# Patient Record
Sex: FEMALE | Race: BLACK OR AFRICAN AMERICAN | NOT HISPANIC OR LATINO | Employment: OTHER | URBAN - METROPOLITAN AREA
[De-identification: names, ages, dates, MRNs, and addresses within clinical notes are randomized per-mention and may not be internally consistent; named-entity substitution may affect disease eponyms.]

---

## 2017-03-08 ENCOUNTER — OFFICE VISIT (OUTPATIENT)
Dept: OPTOMETRY | Facility: CLINIC | Age: 57
End: 2017-03-08
Payer: COMMERCIAL

## 2017-03-08 DIAGNOSIS — I10 ESSENTIAL HYPERTENSION: ICD-10-CM

## 2017-03-08 DIAGNOSIS — H35.89 WHITE WITHOUT PRESSURE OF PERIPHERAL RETINA OF RIGHT EYE: ICD-10-CM

## 2017-03-08 DIAGNOSIS — Z13.5 SCREENING FOR EYE CONDITION: ICD-10-CM

## 2017-03-08 DIAGNOSIS — H52.203 ASTIGMATISM, BILATERAL: ICD-10-CM

## 2017-03-08 DIAGNOSIS — H52.4 PRESBYOPIA OU: ICD-10-CM

## 2017-03-08 DIAGNOSIS — H25.13 NUCLEAR SCLEROSIS, BILATERAL: Primary | ICD-10-CM

## 2017-03-08 DIAGNOSIS — H52.13 HIGH MYOPIA, BOTH EYES: ICD-10-CM

## 2017-03-08 PROCEDURE — 92004 COMPRE OPH EXAM NEW PT 1/>: CPT | Mod: S$GLB,,, | Performed by: OPTOMETRIST

## 2017-03-08 PROCEDURE — 99999 PR PBB SHADOW E&M-EST. PATIENT-LVL III: CPT | Mod: PBBFAC,,, | Performed by: OPTOMETRIST

## 2017-03-08 PROCEDURE — 92015 DETERMINE REFRACTIVE STATE: CPT | Mod: S$GLB,,, | Performed by: OPTOMETRIST

## 2017-03-08 NOTE — MR AVS SNAPSHOT
[facility]  [address]  [facility] [address]  Phone: [phone]  Fax: [phone]                  Latia Posada   3/8/2017 10:15 AM   Office Visit    Description:  Female : 1960   Provider:  Branden Esqueda OD   Department:  [facility]           Reason for Visit     Concerns About Ocular Health                To Do List           Goals (5 Years of Data)     None      [facility]     [facility] On Call Nurse Care Line -  Assistance  Registered nurses in the [facility] On Call Center provide clinical advisement, health education, appointment booking, and other advisory services.  Call for this free service at [phone].             Medications           Message regarding Medications     Verify the changes and/or additions to your medication regime listed below are the same as discussed with your clinician today.  If any of these changes or additions are incorrect, please notify your healthcare provider.             Verify that the below list of medications is an accurate representation of the medications you are currently taking.  If none reported, the list may be blank. If incorrect, please contact your healthcare provider. Carry this list with you in case of emergency.           Current Medications     amlodipine (NORVASC) 10 MG tablet TAKE 1 TABLET BY MOUTH EVERY DAY    amlodipine (NORVASC) 10 MG tablet TAKE 1 TABLET BY MOUTH EVERY DAY    amlodipine (NORVASC) 10 MG tablet TAKE 1 TABLET BY MOUTH EVERY DAY    benazepril (LOTENSIN) 40 MG tablet TAKE 1 TABLET BY MOUTH EVERY DAY    benazepril (LOTENSIN) 40 MG tablet TAKE 1 TABLET BY MOUTH EVERY DAY    benazepril (LOTENSIN) 40 MG tablet TAKE 1 TABLET BY MOUTH EVERY DAY           Clinical Reference Information           Allergies as of 3/8/2017     Lactose      Immunizations Administered on Date of Encounter - 3/8/2017     None      Instructions    New spectacle lens Rx issued.  Recheck in 12 months.         Language Assistance Services     ATTENTION: Language assistance services are available, free of charge. Please call [phone].      ATENCIÓN: Si habla efren, tiene a solano disposición servicios gratuitos de asistencia lingüística. Llame al [phone].     CHÚ Ý: N?u b?n nói Ti?ng Vi?t, có các d?ch v? h? tr? ngôn ng? mi?n phí dành cho b?n. G?i s? [phone].         [facility] complies with applicable Federal civil rights laws and does not discriminate on the basis of race, color, national origin, age, disability, or sex.

## 2017-03-08 NOTE — PATIENT INSTRUCTIONS
Early/trace nuclear sclerosis of lens of both eyes.  White without pressure of peripheral retina of the right eye.  Essential hypertension, without evidence of hypertensive retinopathy.  High myopia with astigmatism in each eye, with very satisfactory correctable VA in each eye.  Presbyopia consistent with age.    New spectacle lens Rx issued.  Recheck in 12 months.

## 2017-03-09 NOTE — PROGRESS NOTES
HPI     Concerns About Ocular Health    Additional comments: Eye exam and refraction.  notes some slight decrease   in VA with glasses, but overall doing satisfactorily.   Some (sinus?)   headaches.            Comments   Patient's age: 56 y.o. AAF   Occupation: Writer  Approximate date of last eye examination: 08/11/2011  Name of last eye doctor seen: Dr. Esqueda   City/State: [facility]   Wears glasses? Yes      If yes, wears  Full-time or part-time?  Full time   Present glasses are: Bifocal, SV Distance, SV Reading?  Progressive   Approximate age of present glasses:  7 yrs old    Got new glasses following last exam, or subsequently?:  Yes    Any problem with VA with glasses?  Pt feels her vision has changed   overall   Wears CLs?:  No  Headaches?  No  Eye pain/discomfort?  No                                                                                     Flashes?  No  Floaters?  No  Diplopia/Double vision?  No  Patient's Ocular History:         Any eye surgeries? No         Any eye injury?  No         Any treatment for eye disease? Giant papillar conjunctivitis  Family history of eye disease?  Glaucoma- father   Significant patient medical history:         1. Diabetes?  No       If yes, IDDM or NIDDM?  n/a   2. HBP?  Yes, managed with medication                  ! OTC eyedrops currently using:  Redness relief    ! Prescription eye meds currently using:  No   ! Any history of allergy/adverse reaction to any eye meds used   previously?  No    ! Any history of allergy/adverse reaction to eyedrops used during prior   eye exam(s)? No    ! Any history of allergy/adverse reaction to Novacaine or similar meds?   No   ! Any history of allergy/adverse reaction to Epinephrine or similar meds?   No    ! Patient okay with use of anesthetic eyedrops to check eye pressure?    yes        ! Patient okay with use of eyedrops to dilate pupils today?  yes   !  Allergies/Medications/Medical History/Family History reviewed today?   "  Yes       PD = 67/63   Desired reading distance =  15.75"  Approx computer distance =                                                                  Last edited by Branden Esqueda, OD on 3/8/2017 11:21 AM. (History)            Assessment /Plan     For exam results, see Encounter Report.    1. Nuclear sclerosis, bilateral     2. White without pressure of peripheral retina of right eye     3. High myopia, both eyes     4. Astigmatism, bilateral     5. Presbyopia OU     6. Essential hypertension     7. Screening for eye condition                    Early/trace nuclear sclerosis of lens of both eyes.  White without pressure of peripheral retina of the right eye.  Essential hypertension, without evidence of hypertensive retinopathy.  High myopia with astigmatism in each eye, with very satisfactory correctable VA in each eye.  Presbyopia consistent with age.    New spectacle lens Rx issued.  Recheck in 12 months.         "

## 2017-03-31 RX ORDER — BENAZEPRIL HYDROCHLORIDE 40 MG/1
TABLET ORAL
Qty: 90 TABLET | Refills: 0 | Status: SHIPPED | OUTPATIENT
Start: 2017-03-31 | End: 2017-06-14 | Stop reason: SDUPTHER

## 2017-05-22 ENCOUNTER — HOSPITAL ENCOUNTER (OUTPATIENT)
Dept: RADIOLOGY | Facility: HOSPITAL | Age: 57
Discharge: HOME | End: 2017-05-22
Attending: INTERNAL MEDICINE
Payer: COMMERCIAL

## 2017-05-22 DIAGNOSIS — N20.0 CALCULUS OF KIDNEY: ICD-10-CM

## 2017-05-22 DIAGNOSIS — N28.1 ACQUIRED CYST OF KIDNEY: ICD-10-CM

## 2017-05-22 DIAGNOSIS — I10 ESSENTIAL HYPERTENSION: ICD-10-CM

## 2017-05-22 PROCEDURE — 76770 US EXAM ABDO BACK WALL COMP: CPT | Mod: TC

## 2017-05-22 PROCEDURE — 76770 US EXAM ABDO BACK WALL COMP: CPT | Mod: 26,,, | Performed by: RADIOLOGY

## 2017-06-14 ENCOUNTER — OFFICE VISIT (OUTPATIENT)
Dept: NEPHROLOGY | Facility: CLINIC | Age: 57
End: 2017-06-14
Payer: COMMERCIAL

## 2017-06-14 VITALS
DIASTOLIC BLOOD PRESSURE: 90 MMHG | HEART RATE: 96 BPM | OXYGEN SATURATION: 93 % | BODY MASS INDEX: 22.85 KG/M2 | SYSTOLIC BLOOD PRESSURE: 124 MMHG | WEIGHT: 142.19 LBS | HEIGHT: 66 IN

## 2017-06-14 DIAGNOSIS — N20.0 CALCULUS OF KIDNEY: ICD-10-CM

## 2017-06-14 DIAGNOSIS — I10 ESSENTIAL HYPERTENSION: Primary | ICD-10-CM

## 2017-06-14 DIAGNOSIS — N28.1 ACQUIRED CYST OF KIDNEY: ICD-10-CM

## 2017-06-14 PROCEDURE — 99999 PR PBB SHADOW E&M-EST. PATIENT-LVL II: CPT | Mod: PBBFAC,,, | Performed by: INTERNAL MEDICINE

## 2017-06-14 PROCEDURE — 99213 OFFICE O/P EST LOW 20 MIN: CPT | Mod: S$GLB,,, | Performed by: INTERNAL MEDICINE

## 2017-06-14 RX ORDER — BENAZEPRIL HYDROCHLORIDE 40 MG/1
40 TABLET ORAL DAILY
Qty: 90 TABLET | Refills: 3 | Status: SHIPPED | OUTPATIENT
Start: 2017-06-14 | End: 2018-06-09 | Stop reason: SDUPTHER

## 2017-06-14 RX ORDER — AMLODIPINE BESYLATE 10 MG/1
10 TABLET ORAL DAILY
Qty: 90 TABLET | Refills: 3 | Status: SHIPPED | OUTPATIENT
Start: 2017-06-14 | End: 2018-06-09 | Stop reason: SDUPTHER

## 2017-06-14 NOTE — PROGRESS NOTES
Subjective:       Patient ID: Latia Posada is a 57 y.o. Black or  female who presents for follow-up evaluation of hypertension and nephrolithiasis.    HPI  She has no urinary symptoms and is in her usual state of health.  There have been no recent illnesses, hospitalizations or procedures. Last renal colic documented several years ago.  She does not take NSAIDs.       Review of Systems   Constitutional: Negative for appetite change, chills and fever.   Eyes: Negative for visual disturbance.   Respiratory: Negative for cough and shortness of breath.    Cardiovascular: Negative for chest pain and leg swelling.   Gastrointestinal: Negative for diarrhea, nausea and vomiting.   Genitourinary: Negative for difficulty urinating, dysuria and hematuria.   Skin: Negative for rash.       Objective:    /89 mmHg  Physical Exam   Constitutional: She appears well-developed and well-nourished. No distress.   HENT:   Head: Normocephalic and atraumatic.   Eyes: Conjunctivae are normal. No scleral icterus.   Neck: Normal range of motion. No JVD present.   Cardiovascular: Normal rate, regular rhythm and normal heart sounds.  Exam reveals no gallop and no friction rub.    No murmur heard.  Pulmonary/Chest: Effort normal and breath sounds normal. No respiratory distress. She has no wheezes.   Abdominal: Soft. Bowel sounds are normal. She exhibits no distension. There is no tenderness.   Musculoskeletal: She exhibits no edema.   Skin: Skin is warm and dry. No rash noted.   Psychiatric: She has a normal mood and affect.   Vitals reviewed.      Serum Cr 0.7 mg/dL    Assessment:       1. Essential hypertension. Relatively well controlled. Will continue benazepril and amlodiopine   2. Acquired cyst of kidney. Unchanged   3. Calculus of kidney. Last renal US shows less stones. Reminded to maintain high water intake and low salt intake.        Plan:   Return to clinic in 1 year

## 2017-06-22 ENCOUNTER — OFFICE VISIT (OUTPATIENT)
Dept: INTERNAL MEDICINE | Facility: CLINIC | Age: 57
End: 2017-06-22
Payer: COMMERCIAL

## 2017-06-22 DIAGNOSIS — I10 ESSENTIAL HYPERTENSION: ICD-10-CM

## 2017-06-22 DIAGNOSIS — Z12.31 OTHER SCREENING MAMMOGRAM: ICD-10-CM

## 2017-06-22 DIAGNOSIS — E04.1 THYROID NODULE: ICD-10-CM

## 2017-06-22 DIAGNOSIS — Z00.00 ANNUAL PHYSICAL EXAM: Primary | ICD-10-CM

## 2017-06-22 PROCEDURE — 99999 PR PBB SHADOW E&M-EST. PATIENT-LVL III: CPT | Mod: PBBFAC,,, | Performed by: INTERNAL MEDICINE

## 2017-06-22 PROCEDURE — 99396 PREV VISIT EST AGE 40-64: CPT | Mod: S$GLB,,, | Performed by: INTERNAL MEDICINE

## 2017-06-22 NOTE — PATIENT INSTRUCTIONS
Back Care Tips    Caring for your back  These are things you can do to prevent a recurrence of acute back pain and to reduce symptoms from chronic back pain:  · Maintain a healthy weight. If you are overweight, losing weight will help most types of back pain.  · Exercise is an important part of recovery from most types of back pain. The muscles behind and in front of the spine support the back. This means strengthening both the back muscles and the abdominal muscles will provide better support for your spine.   · Swimming and brisk walking are good overall exercises to improve your fitness level.  · Practice safe lifting methods (below).  · Practice good posture when sitting, standing and walking. Avoid prolonged sitting. This puts more stress on the lower back than standing or walking.  · Wear quality shoes with sufficient arch support. Foot and ankle alignment can affect back symptoms. Women should avoid wearing high heels.  · Therapeutic massage can help relax the back muscles without stretching them.  · During the first 24 to 72 hours after an acute injury or flare-up of chronic back pain, apply an ice pack to the painful area for 20 minutes and then remove it for 20 minutes, over a period of 60 to 90 minutes, or several times a day. As a safety precaution, do not use a heating pad at bedtime. Sleeping on a heating pad can lead to skin burns or tissue damage.  · You can alternate ice and heat therapies.  Medications  Talk to your healthcare provider before using medicines, especially if you have other medical problems or are taking other medicines.  · You may use acetaminophen or ibuprofen to control pain, unless your healthcare provider prescribed other pain medicine. If you have chronic conditions like diabetes, liver or kidney disease, stomach ulcers, or gastrointestinal bleeding, or are taking blood thinners, talk with your healthcare provider before taking any medicines.  · Be careful if you are given  prescription pain medicines, narcotics, or medicine for muscle spasm. They can cause drowsiness, affect your coordination, reflexes, and judgment. Do not drive or operate heavy machinery while taking these types of medicines. Take prescription pain medicine only as prescribed by your healthcare provider.  Lumbar stretch  Here is a simple stretching exercise that will help relax muscle spasm and keep your back more limber. If exercise makes your back pain worse, dont do it.  · Lie on your back with your knees bent and both feet on the ground.  · Slowly raise your left knee to your chest as you flatten your lower back against the floor. Hold for 5 seconds.  · Relax and repeat the exercise with your right knee.  · Do 10 of these exercises for each leg.  Safe lifting method  · Dont bend over at the waist to lift an object off the floor.  Instead, bend your knees and hips in a squat.   · Keep your back and head upright  · Hold the object close to your body, directly in front of you.  · Straighten your legs to lift the object.   · Lower the object to the floor in the reverse fashion.  · If you must slide something across the floor, push it.  Posture tips  Sitting  Sit in chairs with straight backs or low-back support. Keep your knees lower than your hips, with your feet flat on the floor.  When driving, sit up straight. Adjust the seat forward so you are not leaning toward the steering wheel.  A small pillow or rolled towel behind your lower back may help if you are driving long distances.   Standing  When standing for long periods, shift most of your weight to one leg at a time. Alternate legs every few minutes.   Sleeping  The best way to sleep is on your side with your knees bent. Put a low pillow under your head to support your neck in a neutral spine position. Avoid thick pillows that bend your neck to one side. Put a pillow between your legs to further relax your lower back. If you sleep on your back, put pillows  under your knees to support your legs in a slightly flexed position. Use a firm mattress. If your mattress sags, replace it, or use a 1/2-inch plywood board under the mattress to add support.  Follow-up care  Follow up with your healthcare provider, or as advised.  If X-rays, a CT scan or an MRI scan were taken, they will be reviewed by a radiologist. You will be notified of any new findings that may affect your care.  Call 911  Seek emergency medical care if any of the following occur:  · Trouble breathing  · Confusion  · Very drowsy  · Fainting or loss of consciousness  · Rapid or very slow heart rate  · Loss of  bowel or bladder control  When to seek medical care  Call your healthcare provider if any of the following occur:  · Pain becomes worse or spreads to your arms or legs  · Weakness or numbness in one or both arms or legs  · Numbness in the groin area  Date Last Reviewed: 6/1/2016  © 7768-1316 [employer]. [address] [place] [address]. All rights reserved. This information is not intended as a substitute for professional medical care. Always follow your healthcare professional's instructions.        Back Pain (Acute or Chronic)    Back pain is one of the most common problems. The good news is that most people feel better in 1 to 2 weeks, and most of the rest in 1 to 2 months. Most people can remain active.  People experience and describe pain differently; not everyone is the same.  · The pain can be sharp, stabbing, shooting, aching, cramping or burning.  · Movement, standing, bending, lifting, sitting, or walking may worsen pain.  · It can be localized to one spot or area, or it can be more generalized.  · It can spread or radiate upwards, to the front, or go down your arms or legs (sciatica).  · It can cause muscle spasm.  Most of the time, mechanical problems with the muscles or spine cause the pain. Mechanical problems are usually caused by an injury to the muscles or  ligaments. While illness can cause back pain, it is usually not caused by a serious illness. Mechanical problems include:   · Physical activity such as sports, exercise, work, or normal activity  · Overexertion, lifting, pushing, pulling incorrectly or too aggressively  · Sudden twisting, bending, or stretching from an accident, or accidental movement  · Poor posture  · Stretching or moving wrong, without noticing pain at the time  · Poor coordination, lack of regular exercise (check with your doctor about this)  · Spinal disc disease or arthritis  · Stress  Pain can also be related to pregnancy, or illness like appendicitis, bladder or kidney infections, pelvic infections, and many other things.  Acute back pain usually gets better in 1 to 2 weeks. Back pain related to disk disease, arthritis in the spinal joints or spinal stenosis (narrowing of the spinal canal) can become chronic and last for months or years.  Unless you had a physical injury (for example, a car accident or fall) X-rays are usually not needed for the initial evaluation of back pain. If pain continues and does not respond to medical treatment, X-rays and other tests may be needed.  Home care  Try these home care recommendations:  · When in bed, try to find a position of comfort. A firm mattress is best. Try lying flat on your back with pillows under your knees. You can also try lying on your side with your knees bent up towards your chest and a pillow between your knees.  · At first, do not try to stretch out the sore spots. If there is a strain, it is not like the good soreness you get after exercising without an injury. In this case, stretching may make it worse.  · Avoid prolong sitting, long car rides, or travel. This puts more stress on the lower back than standing or walking.  · During the first 24 to 72 hours after an acute injury or flare up of chronic back pain, apply an ice pack to the painful area for 20 minutes and then remove it for  20 minutes. Do this over a period of 60 to 90 minutes or several times a day. This will reduce swelling and pain. Wrap the ice pack in a thin towel or plastic to protect your skin.  · You can start with ice, then switch to heat. Heat (hot shower, hot bath, or heating pad) reduces pain and works well for muscle spasms. Heat can be applied to the painful area for 20 minutes then remove it for 20 minutes. Do this over a period of 60 to 90 minutes or several times a day. Do not sleep on a heating pad. It can lead to skin burns or tissue damage.  · You can alternate ice and heat therapy. Talk with your doctor about the best treatment for your back pain.  · Therapeutic massage can help relax the back muscles without stretching them.  · Be aware of safe lifting methods and do not lift anything without stretching first.  Medicines  Talk to your doctor before using medicine, especially if you have other medical problems or are taking other medicines.  · You may use over-the-counter medicine as directed on the bottle to control pain, unless another pain medicine was prescribed. If you have chronic conditions like diabetes, liver or kidney disease, stomach ulcers, or gastrointestinal bleeding, or are taking blood thinners, talk to your doctor before taking any medicine.  · Be careful if you are given a prescription medicines, narcotics, or medicine for muscle spasms. They can cause drowsiness, affect your coordination, reflexes, and judgement. Do not drive or operate heavy machinery.  Follow-up care  Follow up with your healthcare provider, or as advised.   A radiologist will review any X-rays that were taken. Your provide will notify you of any new findings that may affect your care.  Call 911  Call emergency services if any of the following occur:  · Trouble breathing  · Confusion  · Very drowsy or trouble awakening  · Fainting or loss of consciousness  · Rapid or very slow heart rate  · Loss of bowel or bladder  control  When to seek medical advice  Call your healthcare provider right away if any of these occur:   · Pain becomes worse or spreads to your legs  · Weakness or numbness in one or both legs  · Numbness in the groin or genital area  Date Last Reviewed: 7/1/2016 © 2000-2016 [employer]. [address] [place] [address], [place] [address]. All rights reserved. This information is not intended as a substitute for professional medical care. Always follow your healthcare professional's instructions.        Self-Care for Low Back Pain    Most people have low back pain now and then. In many cases, it isnt serious and self-care can help. Sometimes low back pain can be a sign of a bigger problem. Call your healthcare provider if your pain returns often or gets worse over time. For the long-term care of your back, get regular exercise, lose any excess weight and learn good posture.  Take a short rest  Lying down during the day may be beneficial for short periods of time if severe pain increases with sitting or standing. Long-term bed rest could be detrimental.  Reduce pain and swelling  Cold reduces swelling. Both cold and heat can reduce pain. Protect your skin by placing a towel between your body and the ice or heat source.  · For the first few days, apply an ice pack for 15 to 20 minutes .  · After the first few days, try heat for 15 minutes at a time to ease pain. Never sleep on a heating pad.  · Over-the-counter medicine can help control pain and swelling. Try aspirin or ibuprofen.  Exercise  Exercise can help your back heal. It also helps your back get stronger and more flexible, preventing any reinjury. Ask your healthcare provider about specific exercises for your back.  Use good posture to avoid reinjury  · When moving, bend at the hips and knees. Dont bend at the waist or twist around.  · When lifting, keep the object close to your body. Dont try to lift more than you can handle.  · When sitting, keep your  lower back supported. Use a rolled-up towel as needed.  Seek immediate medical care if:  · Youre unable to stand or walk.  · You have a temperature over 100.4°F (38.0°C)  · You have frequent, painful, or bloody urination.  · You have severe abdominal pain.  · You have a sharp, stabbing pain.  · Your pain is constant.  · You have pain or numbness in your leg.  · You feel pain in a new area of your back.  · You notice that the pain isnt decreasing after more than a week.   Date Last Reviewed: 9/29/2015 © 2000-2016 [employer]. [address] [place] [address], [place] [address]. All rights reserved. This information is not intended as a substitute for professional medical care. Always follow your healthcare professional's instructions.        Relieving Back Pain  Back pain is a common problem. You can strain back muscles by lifting too much weight or just by moving the wrong way. Back strain can be uncomfortable, even painful. And it can take weeks or months to improve. To help yourself feel better and prevent future back strains, try these tips.  Important Note: Do not give aspirin to children or teens without first discussing it with your healthcare provider.      ? Ice    Ice reduces muscle pain and swelling. It helps most during the first 24 to 48 hours after an injury.  · Wrap an ice pack or a bag of frozen peas in a thin towel. (Never place ice directly on your skin.)  · Place the ice where your back hurts the most.  · Dont ice for more than 20 minutes at a time.  · You can use ice several times a day.  ? Medicines  Over-the-counter pain relievers can include acetaminophen and anti-inflammatory medicines, which includes aspirin or ibuprofen. They can help ease discomfort. Some also reduce swelling.  · Tell your healthcare provider about any medicines you are already taking.  · Take medicines only as directed.  ? Heat  After the first 48 hours, heat can relax sore muscles and improve blood flow.  · Try a  warm bath or shower. Or use a heating pad set on low. To prevent a burn, keep a cloth between you and the heating pad.  · Dont use a heating pad for more than 15 minutes at a time. Never sleep on a heating pad.  Date Last Reviewed: 9/1/2015  © 5638-8367 [employer]. [address] [place] [address], [place] [address]. All rights reserved. This information is not intended as a substitute for professional medical care. Always follow your healthcare professional's instructions.        Caring for Your Back Throughout the Day  Take care of your back throughout the day. You will likely have fewer back problems if you do. Try to warm up before you move. Shift positions often. Also do your best to form healthy habits.    Warm up for the day  Do a few slow, catlike stretches before starting your day. This simple warmup can soften your disks, stretch your back muscles, and help prevent injuries.  Shift positions often  At work and at home, change positions often. This helps keep your body from getting stiff. Stand up or lean back while you sit. If you can, get up and move every 1/2 hour.  Form healthy habits  Here are some suggestions:   · Keep a healthy weight. When you weigh too much, your back is under excess strain. But losing just a few extra pounds can help a lot.  · Try not to overeat. Learn about serving sizes. The size of a serving depends on the food and the food group. Many foods list serving sizes on the labels.  · Handle minor aches with cold and heat. Apply cold the first 24 to 48 hours. Use heat after that. Always place a thin cloth between your skin and the source of cold or heat.  · Take medicines as directed. This helps keep pain under control. Always read labels, and call your healthcare provider or pharmacist if you have any questions.  Walk each day  A daily walk keeps your back and thigh muscles stretched and strong. This gives your back better support. Be sure to walk with your spines three curves  aligned, by keeping your head, hips, and toes connected by a vertical line.   Date Last Reviewed: 10/18/2015  © 4119-1781 [email or web address]. [address] [place] [address]. All rights reserved. This information is not intended as a substitute for professional medical care. Always follow your healthcare professional's instructions.        Back Exercises: Bridge  The bridge exercise strengthens your abdominal, buttock, and hamstring muscles. This helps keep your back stable and aligned when you walk.  · Lie on the floor with your back and palms flat. Bend your knees. Keep your feet flat on the floor.  · Contract your abdominal and buttock muscles. Slowly lift your buttocks off the floor until there is a straight line from your knees to your shoulders.  · Hold for 5 to 15  seconds. Repeat 5 to10 times.    Date Last Reviewed: 8/31/2015  © 7435-8435 [email or web address]. [address] [place] [address]. All rights reserved. This information is not intended as a substitute for professional medical care. Always follow your healthcare professional's instructions.        Back Exercises: Knee Lift        To start, lie on your back with your knees bent and feet flat on the floor. Dont press your neck or lower back to the floor. Breathe deeply. You should feel comfortable and relaxed in this position:  · Start by tightening your abdominal muscles.  · Lift one bent knee off the floor 2 to 4 inches.  · Hold for 10 seconds. Return to start position.  · Repeat 3 times.  · Switch legs.  Date Last Reviewed: 8/16/2015  © 2476-9696 [email or web address]. [address] [place] [address]. All rights reserved. This information is not intended as a substitute for professional medical care. Always follow your healthcare professional's instructions.        Sanjay Decker (Posture and Strength)    1. Sit in a chair with your feet flat on the floor, or stand up. Relax your shoulders.  2. Look  straight ahead. Gently glide your chin straight back. Its a small movement. Dont tilt your head up or down, or bend your neck forward.  3. Hold for 5 seconds. Then relax.  4. Repeat 5 times, or as instructed.     Tip: Dont arch your back or hunch your shoulders.   Date Last Reviewed: 5/1/2016  © 0185-3768 [employer]. [address] [place] [address]. All rights reserved. This information is not intended as a substitute for professional medical care. Always follow your healthcare professional's instructions.        Neck Exercises: Active Neck Rotation    To start, lie on your back, knees bent and feet flat on the floor. Keep your ears, shoulders, and hips aligned, but dont press your lower back to the floor. Rest your hands on your pelvis. Breathe deeply and relax.    · Use your neck muscles to turn your head to one side until you feel a stretch in the muscles.  · Hold for 5 seconds. Then turn to the other side.  · Repeat 5 times on each side.  Note: Keep your shoulders on the floor. Dont lift or tuck your chin as you turn your head.  Date Last Reviewed: 8/16/2015 © 2000-2016 [employer]. [address] [place] [address]. All rights reserved. This information is not intended as a substitute for professional medical care. Always follow your healthcare professional's instructions.        Exercises: Neck Isometrics  To start, sit in a chair with your feet flat on the floor. Your weight should be slightly forward so that youre balanced evenly on your buttocks. Relax your shoulders and keep your head level. Using a chair with arms may help you keep your balance.  5. Press your palm against your forehead. Resist with your neck muscles. Hold for 10 seconds. Relax. Repeat 5 times.  6. Do the exercise again, pressing on the side of your head. Repeat 5 times. Switch sides.  7. Do the exercise again, pressing on the back of your head. Repeat 5 times.       For your safety,  check with your healthcare provider before starting an exercise program.   Date Last Reviewed: 8/16/2015  © 9963-4403 [employer]. [address] [place] [address], [place] [address]. All rights reserved. This information is not intended as a substitute for professional medical care. Always follow your healthcare professional's instructions.        Neck Problems: Relieving Your Symptoms  The first goal of treatment is to relieve your symptoms. Your healthcare provider may recommend self-care treatments. These include resting, applying ice and heat, taking medicine, and doing exercises. Your healthcare provider may also recommend that you see a physical therapist who can teach you ways to care for and strengthen your neck.     Heat relaxes sore muscles and helps relieve spasms.   Self-care treatments  Pain can end quickly or last awhile. Either way, youll want relief as soon as possible. Your healthcare provider can tell you which treatments to do at home to help relieve your pain.  · Lying down for a short time takes pressure from the head off the neck.  · Ice and heat can help reduce pain. To bring down swelling, rest an ice pack wrapped in a thin towel on your neck for 10 to 15 minutes. To relax sore muscles, apply a warm, wet towel to the area. Or you can take a warm bath or shower.  · Over-the-counter medicines, such as ibuprofen, naproxen, and aspirin, can help reduce pain and swelling. Acetaminophen can help relieve pain. Use these only as directed.  · Exercises can relax muscles and ease stiffness. To prepare, drape a warm, wet towel around your neck and shoulders for 5 minutes. Remove the towel. Then do any exercises recommended to you by your healthcare provider.  Physical therapy  If self-care treatments arent helping relieve neck pain, your healthcare provider may suggest physical therapy. Physical therapy is done by a specialist trained to treat injuries. Your physical therapist (PT) will teach you  how to strengthen muscles, improve the spines alignment, and help you move properly. Treatment methods used in physical therapy may include:  · Heat. A special heating pad called a neck pack may be applied to your neck.  · Exercises. Your PT will teach you exercises to help strengthen your neck and improve its range of motion.  · Joint mobilization. The PT gently moves your vertebrae to help restore motion in your neck joints and reduce neck pain.  · Soft tissue mobilization. The PT massages and stretches the muscles in your neck and shoulders.  · Electrical stimulation. Electrical impulses are sent into your neck. This helps reduce soreness and inflammation.  · Education in body mechanics. The PT shows you ways to position and move your body that protect the neck.  Other treatments  If physical therapy doesnt relieve your neck pain, your healthcare provider may suggest other treatments. For example, medicines or injections can help relieve pain and swelling. In some cases, surgery may be needed to treat neck problems.  Date Last Reviewed: 8/23/2015  © 4696-1309 [employer]. [address] [place] [address], [place] [address]. All rights reserved. This information is not intended as a substitute for professional medical care. Always follow your healthcare professional's instructions.        Reducing Knee Pain and Swelling    Many treatments can help reduce pain and swelling in your knee. Your healthcare provider or physical therapist may suggest one or more of the following treatments:  · Icing your knee helps reduce swelling. You may be asked to ice your knee once a day or more. Apply ice for about 15 to 20 minutes at a time, with at least 40 minutes between sessions. Always keep a towel between the ice and your skin.   · Keeping your leg raised above your heart helps excess fluid flow out of your knee joint. This reduces swelling.  · Compression means wrapping an elastic bandage or neoprene sleeve snugly  around your knees. This keeps fluid from collecting in your knee joint.  · Electrical stimulation, done by a physical therapist or , can help reduce excess fluid in your knee joint.  · Anti-inflammatory medicines may be prescribed by your healthcare provider. You may take pills or receive injections in your knee.  · Isometric (carmine) exercises strengthen the muscles that support your knee joint. They also help reduce excess fluid in your knee.  · Massage helps fluid drain away from your knee.  Date Last Reviewed: 10/13/2015  © 3502-8226 [employer]. [address] [place] [address]. All rights reserved. This information is not intended as a substitute for professional medical care. Always follow your healthcare professional's instructions.        Knee Pain  Knee pain is very common. Its especially common in active people who put a lot of pressure on their knees, like runners. It affects women more often than men.  Your kneecap (patella) is a thick, round bone. It covers and protects the front portion of your knee joint. It moves along a groove in your thighbone (femur) as part of the patellofemoral joint. A layer of cartilage surrounds the underside of your kneecap. This layer protects it from grinding against your femur.  When this cartilage softens and breaks down, it can cause knee pain. This is partly because of repetitive stress. The stress irritates the lining of the joint. This causes pain in the underlying bone.  What causes knee pain?  Many things can cause knee pain. You may have more than one cause. Some of these include:  · Overuse of the knee joint  · The kneecap doesnt line up with the tissue around it  · Damage to small nerves in the area  · Damage to the ligament-like structure that holds the kneecap in place (retinaculum)  · Breakdown of the bone under the cartilage  · Swelling in the soft tissues around the kneecap  · Injury  You might be more likely  to have knee pain if you:  · Exercise a lot  · Recently increased the intensity of your workouts  · Have a body mass index (BMI) greater than 25  · Have poor alignment of your kneecap  · Walk with your feet turned overly outward or inward  · Have weakness in surrounding muscle groups (inner quad or hip adductor muscles)  · Have too much tightness in surrounding muscle groups (hamstrings or iliotibial band)  · Have a recent history of injury to the area  · Are female  Symptoms of knee pain  This type of knee pain is a dull, aching pain in the front of the knee in the area under and around the kneecap. This pain may start quickly or slowly. Your pain might be worse when you squat, run, or sit for a long time. You might also sometimes feel like your knee is giving out. You may have symptoms in one or both of your knees.  Diagnosing knee pain  Your health care provider will ask about your medical history and your symptoms. Be sure to describe any activities that make your knee pain worse. He or she will look at your knee. This will include tests of your range of motion, strength, and areas of pain of your knee. Your knee alignment will be checked.  Your health care provider will need to rule out other causes of your knee pain, such as arthritis. You may need an imaging test, such as an X-ray or MRI.  Treatment for knee pain  Treatments that can help ease your symptoms may include:  · Avoiding activities for a while that make your pain worse, returning to activity over time  · Icing the outside of your knee when it causes you pain  · Taking over-the-counter pain medicine  · Wearing a knee brace or taping your knee to support it  · Wearing special shoe inserts to help keep your feet in the proper alignment  · Doing special exercises to stretch and strengthen the muscles around your hip and your knee  These steps help most people manage knee pain. But some cases of knee pain need to be treated with surgery. You may need  surgery right away. Or you may need it later if other treatments dont work. Your health care provider may refer you to an orthopedic surgeon. He or she will talk with you about your choices.  Preventing knee pain  Losing weight and correcting excess muscle tightness or muscle weakness may help lower your risk.  In some cases, you can prevent knee pain. To help prevent a flare-up of knee pain, you do these things:  · Regularly do all the exercises your doctor or physical therapist advises  · Support your knee as advised by your doctor or physical therapist  · Increase training gradually, and ease up on training when needed  · Have an expert check your gait for running or other sporting activities  · Stretch properly before and after exercise  · Replace your running shoes regularly  · Lose excess weight     When to call your health care provider  Call your health care provider right away if:  · Your symptoms dont get better after a few weeks of treatment  · You have any new symptoms   Date Last Reviewed: 3/19/2015  © 7405-3732 [employer]. [address] [place] [address]. All rights reserved. This information is not intended as a substitute for professional medical care. Always follow your healthcare professional's instructions.        Knee Pain of Uncertain Cause    There are several common causes for knee pain. These can include:  · A sprain of the ligaments that support the joint  · An injury to the cartilage lining of the joint  · Arthritis from wear-and-tear or inflammation  There are other causes as well. There may also be swelling, reduced movement of the knee joint, and pain with walking. A definite diagnosis will still need to be made. If your symptoms do not improve, further follow-up and testing may be needed.  Home care  · Stay off the injured leg as much as possible until pain improves.  · Apply an ice pack over the injured area for 15 to 20 minutes every 3 to 6 hours. You should  do this for the first 24 to 48 hours. You can make an ice pack by filling a plastic bag that seals at the top with ice cubes and then wrapping it with a thin towel. Continue to use ice packs for relief of pain and swelling as needed. As the ice melts, be careful to avoid getting your wrap, splint, or cast wet. After 48 hours, apply heat (warm shower or warm bath) for 15 to 20 minutes several times a day, or alternate ice and heat. If you have to wear a hook-and-loop knee brace, you can open it to apply the ice pack, or heat, directly to the knee. Never put ice directly on the skin. Always wrap the ice in a towel or other type of cloth.  · You may use over-the-counter pain medicine to control pain, unless another pain medicine was prescribed. If you have chronic liver or kidney disease or ever had a stomach ulcer or GI bleeding, talk with your healthcare provider using these medicines.  · If crutches or a walker have been recommended, do not put weight on the injured leg until you can do so without pain. Check with your healthcare provider before returning to sports or full work duties.  · If you have a hook-and-loop knee brace, you can remove it to bathe and sleep, unless told otherwise.  Follow-up care  Follow up with your healthcare provider as advised. This is usually within 1-2 weeks.  If X-rays were taken, you will be told of any new findings that may affect your care.  Call 911  Call 911 if you have:  · Shortness of breath  · Chest pain  When to seek medical advice  Call your healthcare provider right away if any of these occur:  · Toes or foot becomes swollen, cold, blue, numb, or tingly  · Pain or swelling spreads over the knee or calf  · Warmth or redness appears over the knee or calf  · Other joints become painful  · Rash appears  · Fever of 100.4°F (38°C) or above lasting for 24 to 48 hours  Date Last Reviewed: 11/23/2015  © 6380-9590 [employer]. [address] [place] [address], [place] [address].  All rights reserved. This information is not intended as a substitute for professional medical care. Always follow your healthcare professional's instructions.

## 2017-06-25 VITALS
SYSTOLIC BLOOD PRESSURE: 125 MMHG | WEIGHT: 141.13 LBS | BODY MASS INDEX: 22.68 KG/M2 | DIASTOLIC BLOOD PRESSURE: 80 MMHG | HEIGHT: 66 IN | OXYGEN SATURATION: 99 %

## 2017-06-25 NOTE — PROGRESS NOTES
Subjective:       Patient ID: Latia Posada is a 57 y.o. female.    Chief Complaint: Annual Exam    Annual exam    DOing well, no complaints.    BP stable.    ACC guidelines reviewed.    Patient Active Problem List:     Essential hypertension     Menopause syndrome     Thyroid nodule: stable 2014 repeat 2017     Mixed hyperlipidemia     Family history of colon cancer: father age 60     Vitamin D deficiency disease     Acquired cyst of kidney     Calculus of kidney    Nephrology 6/17  [facility] 3/17  Thyroid u/s 1/14  GYN 5/15      Review of Systems   Constitutional: Negative for activity change, appetite change, chills, fatigue and fever.   HENT: Negative for nosebleeds, sinus pressure and sore throat.    Eyes: Negative for visual disturbance.   Respiratory: Negative for apnea, cough, chest tightness, shortness of breath and wheezing.         Pulse ox low at Nephrology appt- normal today   Cardiovascular: Negative for chest pain, palpitations and leg swelling.   Gastrointestinal: Negative for abdominal distention, abdominal pain, anal bleeding, blood in stool, constipation, diarrhea, nausea and vomiting.   Genitourinary: Negative for dysuria, frequency, hematuria and vaginal bleeding.   Musculoskeletal: Positive for arthralgias. Negative for gait problem, joint swelling and myalgias.        Arthralgias both hands, no real morning stiffness   Skin: Negative for rash.   Neurological: Negative for dizziness, tremors, weakness, light-headedness and headaches.   Hematological: Negative for adenopathy. Does not bruise/bleed easily.   Psychiatric/Behavioral: Negative for confusion, hallucinations, sleep disturbance and suicidal ideas.       Objective:      Physical Exam   Constitutional: She is oriented to person, place, and time. She appears well-developed and well-nourished.   HENT:   Head: Normocephalic and atraumatic.   Right Ear: External ear normal.   Left Ear: External ear normal.   Nose: Nose normal.   Mouth/Throat:  Oropharynx is clear and moist. No oropharyngeal exudate.   Eyes: Conjunctivae and EOM are normal. Pupils are equal, round, and reactive to light. No scleral icterus.   Neck: Normal range of motion. Neck supple. No JVD present. Thyromegaly present.   Cardiovascular: Normal rate, regular rhythm, normal heart sounds and intact distal pulses.  Exam reveals no gallop.    No murmur heard.  Pulmonary/Chest: Effort normal and breath sounds normal. No respiratory distress. She has no wheezes. She exhibits no tenderness.   Abdominal: Soft. Bowel sounds are normal. She exhibits no distension and no mass. There is no tenderness. There is no rebound and no guarding.   Musculoskeletal: Normal range of motion. She exhibits no edema or tenderness.   Lymphadenopathy:     She has no cervical adenopathy.   Neurological: She is alert and oriented to person, place, and time. She displays normal reflexes. No cranial nerve deficit. Coordination normal.   Skin: Skin is warm. No rash noted. No erythema.   Psychiatric: She has a normal mood and affect. Her behavior is normal. Judgment and thought content normal.   Nursing note and vitals reviewed.      Assessment:       1. Annual physical exam    2. Essential hypertension    3. Other screening mammogram    4. Thyroid nodule: stable 2014 repeat 2017        Plan:         Annual physical exam  -     TSH; Future; Expected date: 06/22/2017  -     Lipid panel; Future; Expected date: 06/22/2017  -     Vitamin D; Future; Expected date: 06/22/2017  -     CBC auto differential; Future; Expected date: 06/22/2017  -     CHAYO; Future; Expected date: 06/22/2017  -     Cyclic citrul peptide antibody, IgG; Future; Expected date: 06/22/2017  -     C-reactive protein; Future; Expected date: 06/22/2017  -     Rheumatoid factor; Future; Expected date: 06/22/2017  -     Sedimentation rate, manual; Future; Expected date: 06/22/2017    Essential hypertension: continue meds    Other screening mammogram  -     Mammo  Digital Screening Bilat with CAD; Future; Expected date: 06/22/2017    Thyroid nodule: stable 2014 repeat 2017  -     US Soft Tissue Head Neck Thyroid; Future; Expected date: 06/22/2017    I will review all studies and determine further tx depending on findings

## 2017-06-27 RX ORDER — BENAZEPRIL HYDROCHLORIDE 40 MG/1
TABLET ORAL
Qty: 90 TABLET | Refills: 0 | Status: SHIPPED | OUTPATIENT
Start: 2017-06-27 | End: 2018-03-20 | Stop reason: SDUPTHER

## 2017-06-30 ENCOUNTER — HOSPITAL ENCOUNTER (OUTPATIENT)
Dept: RADIOLOGY | Facility: HOSPITAL | Age: 57
Discharge: HOME | End: 2017-06-30
Attending: INTERNAL MEDICINE
Payer: COMMERCIAL

## 2017-06-30 DIAGNOSIS — Z12.31 OTHER SCREENING MAMMOGRAM: ICD-10-CM

## 2017-06-30 PROCEDURE — 77067 SCR MAMMO BI INCL CAD: CPT | Mod: TC

## 2017-06-30 PROCEDURE — 77067 SCR MAMMO BI INCL CAD: CPT | Mod: 26,,, | Performed by: RADIOLOGY

## 2017-07-05 ENCOUNTER — PATIENT MESSAGE (OUTPATIENT)
Dept: INTERNAL MEDICINE | Facility: CLINIC | Age: 57
End: 2017-07-05

## 2017-07-05 ENCOUNTER — TELEPHONE (OUTPATIENT)
Dept: RADIOLOGY | Facility: HOSPITAL | Age: 57
End: 2017-07-05

## 2017-07-05 DIAGNOSIS — E78.2 MIXED HYPERLIPIDEMIA: Primary | ICD-10-CM

## 2017-07-05 DIAGNOSIS — E55.9 VITAMIN D DEFICIENCY DISEASE: ICD-10-CM

## 2017-07-05 NOTE — TELEPHONE ENCOUNTER
Spoke with patient and explained mammogram findings.Patient expressed understanding of results. Patient is scheduled for a abnormal mammogram follow up appointment at [facility] on 7/7/17

## 2017-07-06 RX ORDER — ATORVASTATIN CALCIUM 20 MG/1
20 TABLET, FILM COATED ORAL DAILY
Qty: 90 TABLET | Refills: 3 | Status: SHIPPED | OUTPATIENT
Start: 2017-07-06 | End: 2018-06-29 | Stop reason: SDUPTHER

## 2017-07-06 RX ORDER — VIT C/E/ZN/COPPR/LUTEIN/ZEAXAN 250MG-90MG
2000 CAPSULE ORAL DAILY
Qty: 60 CAPSULE | Refills: 12 | COMMUNITY
Start: 2017-07-06 | End: 2019-10-09

## 2017-07-07 ENCOUNTER — HOSPITAL ENCOUNTER (OUTPATIENT)
Dept: RADIOLOGY | Facility: HOSPITAL | Age: 57
Discharge: HOME | End: 2017-07-07
Attending: INTERNAL MEDICINE
Payer: COMMERCIAL

## 2017-07-07 DIAGNOSIS — R92.8 ABNORMAL MAMMOGRAM: ICD-10-CM

## 2017-07-07 PROCEDURE — 77065 DX MAMMO INCL CAD UNI: CPT | Mod: 26,,, | Performed by: RADIOLOGY

## 2017-07-07 PROCEDURE — 77061 BREAST TOMOSYNTHESIS UNI: CPT | Mod: 26,,, | Performed by: RADIOLOGY

## 2017-07-07 PROCEDURE — 77061 BREAST TOMOSYNTHESIS UNI: CPT | Mod: TC

## 2017-08-01 ENCOUNTER — PATIENT MESSAGE (OUTPATIENT)
Dept: INTERNAL MEDICINE | Facility: CLINIC | Age: 57
End: 2017-08-01

## 2017-08-03 ENCOUNTER — HOSPITAL ENCOUNTER (OUTPATIENT)
Dept: RADIOLOGY | Facility: HOSPITAL | Age: 57
Discharge: HOME | End: 2017-08-03
Attending: INTERNAL MEDICINE
Payer: COMMERCIAL

## 2017-08-03 DIAGNOSIS — E04.1 THYROID NODULE: ICD-10-CM

## 2017-08-03 PROCEDURE — 76536 US EXAM OF HEAD AND NECK: CPT | Mod: TC

## 2017-08-03 PROCEDURE — 76536 US EXAM OF HEAD AND NECK: CPT | Mod: 26,,, | Performed by: RADIOLOGY

## 2017-08-04 ENCOUNTER — PATIENT MESSAGE (OUTPATIENT)
Dept: INTERNAL MEDICINE | Facility: CLINIC | Age: 57
End: 2017-08-04

## 2017-08-21 ENCOUNTER — OFFICE VISIT (OUTPATIENT)
Dept: URGENT CARE | Facility: CLINIC | Age: 57
End: 2017-08-21
Payer: COMMERCIAL

## 2017-08-21 VITALS
WEIGHT: 140 LBS | TEMPERATURE: 98 F | BODY MASS INDEX: 22.5 KG/M2 | HEART RATE: 100 BPM | OXYGEN SATURATION: 96 % | RESPIRATION RATE: 16 BRPM | DIASTOLIC BLOOD PRESSURE: 97 MMHG | HEIGHT: 66 IN | SYSTOLIC BLOOD PRESSURE: 148 MMHG

## 2017-08-21 DIAGNOSIS — S93.602A FOOT SPRAIN, LEFT, INITIAL ENCOUNTER: ICD-10-CM

## 2017-08-21 DIAGNOSIS — M79.672 FOOT PAIN, LEFT: Primary | ICD-10-CM

## 2017-08-21 PROCEDURE — 3077F SYST BP >= 140 MM HG: CPT | Mod: S$GLB,,, | Performed by: FAMILY MEDICINE

## 2017-08-21 PROCEDURE — 99214 OFFICE O/P EST MOD 30 MIN: CPT | Mod: S$GLB,,, | Performed by: FAMILY MEDICINE

## 2017-08-21 PROCEDURE — 3008F BODY MASS INDEX DOCD: CPT | Mod: S$GLB,,, | Performed by: FAMILY MEDICINE

## 2017-08-21 PROCEDURE — 3080F DIAST BP >= 90 MM HG: CPT | Mod: S$GLB,,, | Performed by: FAMILY MEDICINE

## 2017-08-21 NOTE — PATIENT INSTRUCTIONS
Foot Sprain    A sprain is a stretching or tearing of the ligaments that hold a joint together. There are no broken bones. Sprains generally take from 3-6 weeks to heal. A sprain may be treated with a splint, walking cast, or special boot. Mild sprains may not need any additional support.  Home care  The following guidelines will help you care for your injury at home:  · Keep your leg elevated when sitting or lying down. This is very important during the first 48 hours to reduce swelling. Stay off the injured foot as much as possible until you can walk on it without pain. If needed, you may use crutches during the first week for this purpose. Crutches can be rented at many pharmacies or surgical/orthopedic supply stores.  · You may be given a cast shoe to wear to prevent movement in your foot. If not, you can use a sandal or any shoe that does not put pressure on the injured area until the swelling and pain go away. If using a sandal, be careful not to hit your foot against anything, since another injury could make the sprain worse.  · Apply an ice pack over the injured area for 15 to 20 minutes every 3 to 6 hours. You should do this for the first 24 to 48 hours. You can make an ice pack by filling a plastic bag that seals at the top with ice cubes and then wrapping it with a thin towel. Continue to use ice packs for relief of pain and swelling as needed. As the ice melts, avoid getting any wrap, splint, or cast wet. After 48 hours, apply heat from a warm shower or bath for 20 minutes several times daily. Alternating ice and heat may also be helpful.  · You may use over-the-counter pain medicine to control pain, unless another medicine was prescribed. If you have chronic liver or kidney disease or ever had a stomach ulcer or GI bleeding, talk with your healthcare provider before using these medicines.  · If you were given a splint or cast, keep it dry. Bathe with your splint or cast well out of the water,  protected with 2 large plastic bags, rubber-banded at the top end. If a fiberglass splint or cast gets wet, you can dry it with a hair dryer.  · You may return to sports after healing, when you can run without pain.  Follow-up care  Follow up with your healthcare provider as directed. Sometimes fractures dont show up on the first X-ray. Bruises and sprains can sometimes hurt as much as a fracture. These injuries can take time to heal completely. If your symptoms dont improve or they get worse, talk with your healthcare provider. You may need a repeat X-ray.  When to seek medical advice  Call your healthcare provider right away if any of these occur:  · The plaster cast or splint gets wet or soft  · The fiberglass cast or splint gets wet and does not dry for 24 hours  · Pain or swelling increases, or redness appears  · A bad odor comes from within the cast  · Fever of 100.4°F (38°C) or above lasting for 24 to 48 hours  · Toes on the injured foot become cold, blue, numb, or tingly  Date Last Reviewed: 11/20/2015  © 9862-8445 [employer]. [address] [place] [address], [place] [address]. All rights reserved. This information is not intended as a substitute for professional medical care. Always follow your healthcare professional's instructions.

## 2017-08-21 NOTE — PROGRESS NOTES
"Subjective:       Patient ID: Latia Posada is a 57 y.o. female.    Vitals:  height is 5' 6" (1.676 m) and weight is 63.5 kg (140 lb). Her oral temperature is 98.1 °F (36.7 °C). Her blood pressure is 148/97 (abnormal) and her pulse is 100. Her respiration is 16 and oxygen saturation is 96%.     Chief Complaint: Foot Pain    Pt with left foot pain x 4-5 hours. Pt was walking down the street and stepped incorrectly on sidewalk. She rolled her foot which caused the pain. Pain is near 3/4th metatarsal area. Twisting injury - inversion per patient      Foot Pain   This is a new problem. The current episode started today. The problem occurs constantly. The problem has been unchanged. Associated symptoms include joint swelling. Pertinent negatives include no abdominal pain, chest pain, neck pain, numbness or weakness. The symptoms are aggravated by walking.     Review of Systems   Constitution: Negative for weakness and malaise/fatigue.   HENT: Negative for nosebleeds.    Cardiovascular: Negative for chest pain and syncope.   Respiratory: Negative for shortness of breath.    Musculoskeletal: Positive for joint pain and joint swelling. Negative for back pain and neck pain.   Gastrointestinal: Negative for abdominal pain.   Genitourinary: Negative for hematuria.   Neurological: Negative for dizziness and numbness.       Objective:      Physical Exam   Constitutional: She appears well-developed and well-nourished.   Cardiovascular: Regular rhythm.    Pulmonary/Chest: Effort normal.   Abdominal: Soft.   Musculoskeletal: She exhibits tenderness (dorsum of left foot between third and fourth metatarsal, minimal swelling noted).   Nursing note and vitals reviewed.      Assessment:       1. Foot pain, left    2. Foot sprain, left, initial encounter        Plan:         Foot pain, left  -     X-Ray Foot Complete Left; Future; Expected date: 08/21/2017    Foot sprain, left, initial encounter    OTC analgesia RICE, ace wrap placed  "

## 2017-10-11 ENCOUNTER — LAB VISIT (OUTPATIENT)
Dept: LAB | Facility: HOSPITAL | Age: 57
End: 2017-10-11
Attending: INTERNAL MEDICINE
Payer: COMMERCIAL

## 2017-10-11 ENCOUNTER — PATIENT MESSAGE (OUTPATIENT)
Dept: INTERNAL MEDICINE | Facility: CLINIC | Age: 57
End: 2017-10-11

## 2017-10-11 DIAGNOSIS — E55.9 VITAMIN D DEFICIENCY DISEASE: ICD-10-CM

## 2017-10-11 DIAGNOSIS — E78.2 MIXED HYPERLIPIDEMIA: ICD-10-CM

## 2017-10-11 LAB
25(OH)D3+25(OH)D2 SERPL-MCNC: 39 NG/ML
AST SERPL-CCNC: 33 U/L
CHOLEST SERPL-MCNC: 137 MG/DL
CHOLEST/HDLC SERPL: 2.5 {RATIO}
HDLC SERPL-MCNC: 55 MG/DL
HDLC SERPL: 40.1 %
LDLC SERPL CALC-MCNC: 69 MG/DL
NONHDLC SERPL-MCNC: 82 MG/DL
TRIGL SERPL-MCNC: 65 MG/DL

## 2017-10-11 PROCEDURE — 80061 LIPID PANEL: CPT

## 2017-10-11 PROCEDURE — 82306 VITAMIN D 25 HYDROXY: CPT

## 2017-10-11 PROCEDURE — 84450 TRANSFERASE (AST) (SGOT): CPT

## 2017-10-11 PROCEDURE — 36415 COLL VENOUS BLD VENIPUNCTURE: CPT

## 2018-03-10 ENCOUNTER — TELEPHONE (OUTPATIENT)
Dept: INTERNAL MEDICINE | Facility: CLINIC | Age: 58
End: 2018-03-10

## 2018-03-10 NOTE — TELEPHONE ENCOUNTER
Please contact her to let her know that we received information from her insurance company ([employer]) that her blood pressure has not been well controlled.  I would like for her to come in to see Nicol for a blood pressure check area please schedule and let me know.  Thank you

## 2018-03-12 ENCOUNTER — TELEPHONE (OUTPATIENT)
Dept: INTERNAL MEDICINE | Facility: CLINIC | Age: 58
End: 2018-03-12

## 2018-03-12 NOTE — TELEPHONE ENCOUNTER
----- Message from Lorena Harmon sent at 3/12/2018  1:32 PM CDT -----  Contact: self [phone]  Patient is returning a phone call.  Who left a message for the patient: Hola   Does patient know what this is regarding:  Not sure   Comments:

## 2018-03-12 NOTE — TELEPHONE ENCOUNTER
Attempted to contact pt to schedule HTN f/u with Nicol Fuller. No answer to either number in chart, left voice mail for return call.

## 2018-03-20 ENCOUNTER — OFFICE VISIT (OUTPATIENT)
Dept: INTERNAL MEDICINE | Facility: CLINIC | Age: 58
End: 2018-03-20
Payer: COMMERCIAL

## 2018-03-20 VITALS
HEART RATE: 89 BPM | SYSTOLIC BLOOD PRESSURE: 128 MMHG | BODY MASS INDEX: 22.25 KG/M2 | WEIGHT: 138.44 LBS | DIASTOLIC BLOOD PRESSURE: 80 MMHG | HEIGHT: 66 IN | OXYGEN SATURATION: 95 %

## 2018-03-20 DIAGNOSIS — I10 ESSENTIAL HYPERTENSION: Primary | ICD-10-CM

## 2018-03-20 DIAGNOSIS — E04.1 THYROID NODULE: ICD-10-CM

## 2018-03-20 DIAGNOSIS — I49.9 IRREGULAR HEART RHYTHM: ICD-10-CM

## 2018-03-20 PROCEDURE — 93010 ELECTROCARDIOGRAM REPORT: CPT | Mod: S$GLB,,, | Performed by: INTERNAL MEDICINE

## 2018-03-20 PROCEDURE — 99213 OFFICE O/P EST LOW 20 MIN: CPT | Mod: S$GLB,,, | Performed by: NURSE PRACTITIONER

## 2018-03-20 PROCEDURE — 3074F SYST BP LT 130 MM HG: CPT | Mod: CPTII,S$GLB,, | Performed by: NURSE PRACTITIONER

## 2018-03-20 PROCEDURE — 3079F DIAST BP 80-89 MM HG: CPT | Mod: CPTII,S$GLB,, | Performed by: NURSE PRACTITIONER

## 2018-03-20 PROCEDURE — 99999 PR PBB SHADOW E&M-EST. PATIENT-LVL IV: CPT | Mod: PBBFAC,,, | Performed by: NURSE PRACTITIONER

## 2018-03-20 PROCEDURE — 93005 ELECTROCARDIOGRAM TRACING: CPT | Mod: S$GLB,,, | Performed by: NURSE PRACTITIONER

## 2018-03-20 NOTE — PROGRESS NOTES
INTERNAL MEDICINE URGENT CARE NOTE    CHIEF COMPLAINT     Chief Complaint   Patient presents with    Follow-up     HTN       HPI     Latia Posada is a 57 y.o. female with htn, hld, kidney stones, ckd, thyroid nodule and vit d def who presents for an urgent visit today.    HTN- compliant with amlodipine 10mg and benazepril 40mg.   Ambulatory bp readings: No  Occasional sinus headaches. Denies vision changes, chest pain, sob and urinary changes.     Hypertension  Chronicity: chronic  Onset: more than 1 year ago  Progression since onset: waxing and waning  Condition status: controlled  anxiety: No  blurred vision: No  malaise/fatigue: No  orthopnea: No  peripheral edema: No  PND: No  sweats: No  Agents associated with hypertension: no associated agents, NSAIDs  CAD risks: dyslipidemia, family history  Compliance problems: no compliance problems      Past Medical History:  Past Medical History:   Diagnosis Date    Chronic kidney disease     Family history of colon cancer 12/4/2012    Hyperlipidemia     Hypertension     Kidney stones 12/4/2012    Menopause syndrome 12/4/2012    Thyroid nodule 12/4/2012    Vitamin D deficiency disease 2/3/2014       Home Medications:  Prior to Admission medications    Medication Sig Start Date End Date Taking? Authorizing Provider   amlodipine (NORVASC) 10 MG tablet Take 1 tablet (10 mg total) by mouth once daily. 6/14/17   Keaton Lozoya MD   atorvastatin (LIPITOR) 20 MG tablet Take 1 tablet (20 mg total) by mouth once daily. 7/6/17 7/6/18  Lizzy Joya MD   benazepril (LOTENSIN) 40 MG tablet Take 1 tablet (40 mg total) by mouth once daily. 6/14/17   Keaton Lozoya MD   benazepril (LOTENSIN) 40 MG tablet TAKE 1 TABLET BY MOUTH EVERY DAY 6/27/17   Sharla Meza MD   cholecalciferol, vitamin D3, 1,000 unit capsule Take 2 capsules (2,000 Units total) by mouth once daily. 7/6/17   Lizzy Joya MD       Review of Systems:  Review of Systems   Constitutional:  "Negative for chills, fatigue and fever.   HENT: Positive for congestion and sneezing.    Respiratory: Negative for cough, shortness of breath and wheezing.    Cardiovascular: Negative for chest pain and palpitations.   Genitourinary: Negative.    Musculoskeletal: Negative for neck pain.   Neurological: Negative for dizziness, light-headedness and headaches.       Health Maintainence:   Immunizations:  Health Maintenance       Date Due Completion Date    Influenza Vaccine 08/01/2017 4/13/2015 (Not Clinical)    Override on 4/13/2015: Not Clinically Appropriate    Override on 1/24/2014: Done    Override on 12/4/2012: Done    Mammogram 07/07/2018 7/7/2017    Override on 7/1/2011: Done    Colonoscopy 04/26/2021 4/26/2014    Lipid Panel 10/11/2022 10/11/2017    TETANUS VACCINE 05/09/2026 5/9/2016           PHYSICAL EXAM     /80   Pulse 89   Ht 5' 6" (1.676 m)   Wt 62.8 kg (138 lb 7.2 oz)   SpO2 95%   BMI 22.35 kg/m²     Physical Exam   Constitutional: She is oriented to person, place, and time. She appears well-developed and well-nourished.   HENT:   Head: Normocephalic.   Right Ear: External ear normal.   Left Ear: External ear normal.   Nose: Nose normal.   Mouth/Throat: Oropharynx is clear and moist. No oropharyngeal exudate.   Eyes: Pupils are equal, round, and reactive to light.   Neck: Neck supple. No JVD present. No tracheal deviation present. No thyromegaly present.   Cardiovascular: Normal rate, normal heart sounds and intact distal pulses.  A regularly irregular rhythm present. Exam reveals no gallop and no friction rub.    No murmur heard.  Pulmonary/Chest: Effort normal and breath sounds normal. No respiratory distress. She has no wheezes. She has no rales.   Abdominal: Soft. Bowel sounds are normal. She exhibits no distension. There is no tenderness.   Musculoskeletal: Normal range of motion. She exhibits no edema or tenderness.   Lymphadenopathy:     She has no cervical adenopathy.   Neurological: " She is alert and oriented to person, place, and time.   Skin: Skin is warm and dry. No rash noted.   Psychiatric: She has a normal mood and affect. Her behavior is normal.   Vitals reviewed.      LABS     No results found for: LABA1C, HGBA1C  CMP  Sodium   Date Value Ref Range Status   05/22/2017 143 136 - 145 mmol/L Final     Potassium   Date Value Ref Range Status   05/22/2017 4.3 3.5 - 5.1 mmol/L Final     Chloride   Date Value Ref Range Status   05/22/2017 110 95 - 110 mmol/L Final     CO2   Date Value Ref Range Status   05/22/2017 24 23 - 29 mmol/L Final     Glucose   Date Value Ref Range Status   05/22/2017 86 70 - 110 mg/dL Final     BUN, Bld   Date Value Ref Range Status   05/22/2017 14 6 - 20 mg/dL Final     Creatinine   Date Value Ref Range Status   05/22/2017 0.7 0.5 - 1.4 mg/dL Final     Calcium   Date Value Ref Range Status   05/22/2017 10.2 8.7 - 10.5 mg/dL Final     Total Protein   Date Value Ref Range Status   05/18/2016 7.0 6.0 - 8.4 g/dL Final     Albumin   Date Value Ref Range Status   05/22/2017 3.8 3.5 - 5.2 g/dL Final     Total Bilirubin   Date Value Ref Range Status   05/18/2016 0.4 0.1 - 1.0 mg/dL Final     Comment:     For infants and newborns, interpretation of results should be based  on gestational age, weight and in agreement with clinical  observations.  Premature Infant recommended reference ranges:  Up to 24 hours.............<8.0 mg/dL  Up to 48 hours............<12.0 mg/dL  3-5 days..................<15.0 mg/dL  6-29 days.................<15.0 mg/dL       Alkaline Phosphatase   Date Value Ref Range Status   05/18/2016 113 55 - 135 U/L Final     AST   Date Value Ref Range Status   10/11/2017 33 10 - 40 U/L Final     ALT   Date Value Ref Range Status   05/18/2016 20 10 - 44 U/L Final     Anion Gap   Date Value Ref Range Status   05/22/2017 9 8 - 16 mmol/L Final     eGFR if    Date Value Ref Range Status   05/22/2017 >60.0 >60 mL/min/1.73 m^2 Final     eGFR if non     Date Value Ref Range Status   05/22/2017 >60.0 >60 mL/min/1.73 m^2 Final     Comment:     Calculation used to obtain the estimated glomerular filtration  rate (eGFR) is the CKD-EPI equation. Since race is unknown   in our information system, the eGFR values for   -American and Non--American patients are given   for each creatinine result.       Lab Results   Component Value Date    WBC 3.72 (L) 06/30/2017    HGB 16.2 (H) 06/30/2017    HCT 49.2 (H) 06/30/2017    MCV 88 06/30/2017     06/30/2017     Lab Results   Component Value Date    CHOL 137 10/11/2017    CHOL 226 (H) 06/30/2017    CHOL 214 (H) 05/18/2016     Lab Results   Component Value Date    HDL 55 10/11/2017    HDL 53 06/30/2017    HDL 53 05/18/2016     Lab Results   Component Value Date    LDLCALC 69.0 10/11/2017    LDLCALC 153.2 06/30/2017    LDLCALC 130.6 05/18/2016     Lab Results   Component Value Date    TRIG 65 10/11/2017    TRIG 99 06/30/2017    TRIG 152 (H) 05/18/2016     Lab Results   Component Value Date    CHOLHDL 40.1 10/11/2017    CHOLHDL 23.5 06/30/2017    CHOLHDL 24.8 05/18/2016     Lab Results   Component Value Date    TSH 1.040 06/30/2017       ASSESSMENT/PLAN     Latia Posada is a 57 y.o. female with  Past Medical History:   Diagnosis Date    Chronic kidney disease     Family history of colon cancer 12/4/2012    Hyperlipidemia     Hypertension     Kidney stones 12/4/2012    Menopause syndrome 12/4/2012    Thyroid nodule 12/4/2012    Vitamin D deficiency disease 2/3/2014     Essential hypertension- at goal. Cont current meds. Low na diet discussed.     Thyroid nodule: stable 2014 repeat 2017- thyroid u/s reviewed, multiple small nodule none large enough for FNA.     Irregular heart rhythm- ecg in office NSR.   -     IN OFFICE EKG 12-LEAD (to Muse)          Follow up with PCP in July for annual     Patient education provided from [facility]. Patient was counseled on when and how to seek emergent  care.       Nicol RICH, JACOB, FNP-c   Department of Internal Medicine - [facility]  3:26 PM

## 2018-06-01 ENCOUNTER — PATIENT MESSAGE (OUTPATIENT)
Dept: INTERNAL MEDICINE | Facility: CLINIC | Age: 58
End: 2018-06-01

## 2018-06-09 DIAGNOSIS — I10 ESSENTIAL HYPERTENSION: ICD-10-CM

## 2018-06-09 RX ORDER — AMLODIPINE BESYLATE 10 MG/1
TABLET ORAL
Qty: 90 TABLET | Refills: 0 | Status: SHIPPED | OUTPATIENT
Start: 2018-06-09 | End: 2018-09-07 | Stop reason: SDUPTHER

## 2018-06-09 RX ORDER — BENAZEPRIL HYDROCHLORIDE 40 MG/1
TABLET ORAL
Qty: 90 TABLET | Refills: 0 | Status: SHIPPED | OUTPATIENT
Start: 2018-06-09 | End: 2018-09-07 | Stop reason: SDUPTHER

## 2018-06-18 ENCOUNTER — OFFICE VISIT (OUTPATIENT)
Dept: INTERNAL MEDICINE | Facility: CLINIC | Age: 58
End: 2018-06-18
Payer: COMMERCIAL

## 2018-06-18 VITALS
BODY MASS INDEX: 21.99 KG/M2 | DIASTOLIC BLOOD PRESSURE: 72 MMHG | HEART RATE: 72 BPM | WEIGHT: 136.25 LBS | SYSTOLIC BLOOD PRESSURE: 110 MMHG

## 2018-06-18 DIAGNOSIS — N28.1 ACQUIRED CYST OF KIDNEY: ICD-10-CM

## 2018-06-18 DIAGNOSIS — E04.1 THYROID NODULE: ICD-10-CM

## 2018-06-18 DIAGNOSIS — Z87.898 HISTORY OF ABNORMAL MAMMOGRAM: ICD-10-CM

## 2018-06-18 DIAGNOSIS — Z00.00 ANNUAL PHYSICAL EXAM: Primary | ICD-10-CM

## 2018-06-18 DIAGNOSIS — N20.0 CALCULUS OF KIDNEY: ICD-10-CM

## 2018-06-18 PROCEDURE — 3078F DIAST BP <80 MM HG: CPT | Mod: CPTII,S$GLB,, | Performed by: INTERNAL MEDICINE

## 2018-06-18 PROCEDURE — 99999 PR PBB SHADOW E&M-EST. PATIENT-LVL IV: CPT | Mod: PBBFAC,,, | Performed by: INTERNAL MEDICINE

## 2018-06-18 PROCEDURE — 99396 PREV VISIT EST AGE 40-64: CPT | Mod: S$GLB,,, | Performed by: INTERNAL MEDICINE

## 2018-06-18 PROCEDURE — 3074F SYST BP LT 130 MM HG: CPT | Mod: CPTII,S$GLB,, | Performed by: INTERNAL MEDICINE

## 2018-06-18 NOTE — PATIENT INSTRUCTIONS
Prevention Guidelines, Women Ages 50 to 64  Screening tests and vaccines are an important part of managing your health. Health counseling is essential, too. Below are guidelines for these, for women ages 50 to 64. Talk with your healthcare provider to make sure youre up to date on what you need.  Screening Who needs it How often   Type 2 diabetes or prediabetes All adults beginning at age 45 and adults without symptoms at any age who are overweight or obese and have 1 or more additional risk factors for diabetes. At  least every 3 years   Alcohol misuse All women in this age group At routine exams   Blood pressure All women in this age group Every 2 years if your blood pressure is less than 120/80 mm Hg; yearly if your systolic blood pressure is 120 to 139 mm Hg, or your diastolic blood pressure reading is 80 to 89 mm Hg   Breast cancer All women in this age group Yearly mammogram and clinical breast exam1   Cervical cancer All women in this age group, except women who have had a complete hysterectomy Pap test every 3 years or Pap test with human papillomavirus (HPV) test every 5 years   Chlamydia Women at increased risk for infection At routine exams   Colorectal cancer All women in this age group Flexible sigmoidoscopy every 5 years, or colonoscopy every 10 years, or double-contrast barium enema every 5 years; yearly fecal occult blood test or fecal immunochemical test; or a stool DNA test as often as your health care provider advises; talk with your health care provider about which tests are best for you   Depression All women in this age group At routine exams   Gonorrhea Sexually active women at increased risk for infection At routine exams   Hepatitis C Anyone at increased risk; 1 time for those born between 1945 and 1965 At routine exams   High cholesterol or triglycerides All women in this age group who are at risk for coronary artery disease At least every 5 years   HIV All women At routine exams   Lung  cancer Adults age 55 to 80 who have smoked Yearly screening in smokers with 30 pack-year history of smoking or who quit within 15 years   Obesity All women in this age group At routine exams   Osteoporosis Women who are postmenopausal Ask your healthcare provider   Syphilis Women at increased risk for infection - talk with your healthcare provider At routine exams   Tuberculosis Women at increased risk for infection - talk with your healthcare provider Ask your healthcare provider   Vision All women in this age group Ask your healthcare provider   Vaccine Who needs it How often   Chickenpox (varicella) All women in this age group who have no record of this infection or vaccine 2 doses; the second dose should be given at least 4 weeks after the first dose   Hepatitis A Women at increased risk for infection - talk with your healthcare provider 2 doses given at least 6 months apart   Hepatitis B Women at increased risk for infection - talk with your healthcare provider 3 doses over 6 months; second dose should be given 1 month after the first dose; the third dose should be given at least 2 months after the second dose and at least 4 months after the first dose   Haemophilus influenzaeType B (HIB) Women at increased risk for infection - talk with your healthcare provider 1 to 3 doses   Influenza (flu) All women in this age group Once a year   Measles, mumps, rubella (MMR) Women in this age group through their late 50s who have no record of these infections or vaccines 1 dose   Meningococcal Women at increased risk for infection - talk with your healthcare provider 1 or more doses   Pneumococcal conjugate vaccine (PCV13) and pneumococcal polysaccharide vaccine (PPSV23) Women at increased risk for infection - talk with your healthcare provider PCV13: 1 dose ages 19 to 65 (protects against 13 types of pneumococcal bacteria)  PPSV23: 1 to 2 doses through age 64, or 1 dose at 65 or older (protects against 23 types of  pneumococcal bacteria)   Tetanus/diphtheria/pertussis (Td/Tdap) booster All women in this age group Td every 10 years, or a one-time dose of Tdap instead of a Td booster after age 18, then Td every 10 years   Zoster All women ages 60 and older 1 dose   Counseling Who needs it How often   BRCA gene mutation testing for breast and ovarian cancer susceptibility Women with increased risk for having gene mutation When your risk is known   Breast cancer and chemoprevention Women at high risk for breast cancer When your risk is known   Diet and exercise Women who are overweight or obese When diagnosed, and then at routine exams   Sexually transmitted infection prevention Women at increased risk for infection - talk with your healthcare provider At routine exams   Use of daily aspirin Women ages 55 and up in this age group who are at risk for cardiovascular health problems such as stroke When your risk is known   Use of tobacco and the health effects it can cause All women in this age group Every exam   1American [employer]  Date Last Reviewed: 1/26/2016  © 9661-2676 [employer]. [address] [place] [address], [place] [address]. All rights reserved. This information is not intended as a substitute for professional medical care. Always follow your healthcare professional's instructions.      [employer]

## 2018-06-29 RX ORDER — ATORVASTATIN CALCIUM 20 MG/1
TABLET, FILM COATED ORAL
Qty: 90 TABLET | Refills: 3 | Status: SHIPPED | OUTPATIENT
Start: 2018-06-29 | End: 2019-06-16 | Stop reason: SDUPTHER

## 2018-07-11 ENCOUNTER — OFFICE VISIT (OUTPATIENT)
Dept: NEPHROLOGY | Facility: CLINIC | Age: 58
End: 2018-07-11
Payer: COMMERCIAL

## 2018-07-11 ENCOUNTER — HOSPITAL ENCOUNTER (OUTPATIENT)
Dept: RADIOLOGY | Facility: HOSPITAL | Age: 58
Discharge: HOME | End: 2018-07-11
Attending: INTERNAL MEDICINE
Payer: COMMERCIAL

## 2018-07-11 VITALS
SYSTOLIC BLOOD PRESSURE: 92 MMHG | HEIGHT: 66 IN | OXYGEN SATURATION: 95 % | WEIGHT: 132.5 LBS | HEART RATE: 99 BPM | DIASTOLIC BLOOD PRESSURE: 70 MMHG | BODY MASS INDEX: 21.29 KG/M2

## 2018-07-11 DIAGNOSIS — N20.0 CALCULUS OF KIDNEY: ICD-10-CM

## 2018-07-11 DIAGNOSIS — I10 ESSENTIAL HYPERTENSION: Primary | ICD-10-CM

## 2018-07-11 PROCEDURE — 3074F SYST BP LT 130 MM HG: CPT | Mod: CPTII,S$GLB,, | Performed by: INTERNAL MEDICINE

## 2018-07-11 PROCEDURE — 3078F DIAST BP <80 MM HG: CPT | Mod: CPTII,S$GLB,, | Performed by: INTERNAL MEDICINE

## 2018-07-11 PROCEDURE — 3008F BODY MASS INDEX DOCD: CPT | Mod: CPTII,S$GLB,, | Performed by: INTERNAL MEDICINE

## 2018-07-11 PROCEDURE — 99999 PR PBB SHADOW E&M-EST. PATIENT-LVL II: CPT | Mod: PBBFAC,,, | Performed by: INTERNAL MEDICINE

## 2018-07-11 PROCEDURE — 77066 DX MAMMO INCL CAD BI: CPT | Mod: 26,,, | Performed by: RADIOLOGY

## 2018-07-11 PROCEDURE — 99213 OFFICE O/P EST LOW 20 MIN: CPT | Mod: S$GLB,,, | Performed by: INTERNAL MEDICINE

## 2018-07-11 PROCEDURE — 77062 BREAST TOMOSYNTHESIS BI: CPT | Mod: TC,PO

## 2018-07-11 PROCEDURE — 77062 BREAST TOMOSYNTHESIS BI: CPT | Mod: 26,,, | Performed by: RADIOLOGY

## 2018-07-11 NOTE — PROGRESS NOTES
Subjective:       Patient ID: Latia Posada is a 58 y.o. Black or  female who presents for follow-up evaluation of hypertension and nephrolithiasis.    HPI  She has no urinary symptoms and is in her usual state of health.  There have been no recent illnesses, hospitalizations or procedures. Last renal colic documented several years ago.  She does not take NSAIDs.       Review of Systems   Constitutional: Negative for appetite change, chills and fever.   Eyes: Negative for visual disturbance.   Respiratory: Negative for cough and shortness of breath.    Cardiovascular: Negative for chest pain and leg swelling.   Gastrointestinal: Negative for diarrhea, nausea and vomiting.   Genitourinary: Negative for difficulty urinating, dysuria and hematuria.   Skin: Negative for rash.       Objective:    BP 96/62 mmHg  Physical Exam   Constitutional: She appears well-developed and well-nourished. No distress.   HENT:   Head: Normocephalic and atraumatic.   Eyes: Conjunctivae are normal. No scleral icterus.   Neck: Normal range of motion. No JVD present.   Cardiovascular: Normal rate, regular rhythm and normal heart sounds.  Exam reveals no gallop and no friction rub.    No murmur heard.  Pulmonary/Chest: Effort normal and breath sounds normal. No respiratory distress. She has no wheezes.   Abdominal: Soft. Bowel sounds are normal. She exhibits no distension. There is no tenderness.   Musculoskeletal: She exhibits no edema.   Skin: Skin is warm and dry. No rash noted.   Psychiatric: She has a normal mood and affect.   Vitals reviewed.      Serum Cr 0.7 mg/dL    Assessment:       1. Essential hypertension. Relatively well controlled. Will continue benazepril and amlodipine. Will repeat labs today.    2. Acquired cyst of kidney. Unchanged   3. Calculus of kidney. Last renal US shows less stones. Reminded to maintain high water intake and low salt intake.        Plan:   Return to clinic in 1 year

## 2018-07-11 NOTE — LETTER
July 11, 2018      Lizzy Joya MD  [address]  [facility] [address]           [facility] - Nephrology  [address]  [facility] [address]  Phone: [phone]  Fax: [phone]          Patient: Latia Posada   MR Number: [id number]   YOB: 1960   Date of Visit: 7/11/2018       Dear Dr. Lizzy Joya:    Thank you for referring Latia Posada to me for evaluation. Attached you will find relevant portions of my assessment and plan of care.    If you have questions, please do not hesitate to call me. I look forward to following Latia Posada along with you.    Sincerely,    Keaton Lozoya MD    Enclosure  CC:  No Recipients    If you would like to receive this communication electronically, please contact [email or web address] or [phone] to request more information on [facility] access.    For providers and/or their staff who would like to refer a patient to [facility], please contact us through our one-stop-shop provider referral line, [facility], at [phone].    If you feel you have received this communication in error or would no longer like to receive these types of communications, please e-mail [email or web address]

## 2018-08-06 ENCOUNTER — LAB VISIT (OUTPATIENT)
Dept: LAB | Facility: HOSPITAL | Age: 58
End: 2018-08-06
Attending: INTERNAL MEDICINE
Payer: COMMERCIAL

## 2018-08-06 DIAGNOSIS — Z00.00 ANNUAL PHYSICAL EXAM: ICD-10-CM

## 2018-08-06 LAB
25(OH)D3+25(OH)D2 SERPL-MCNC: 35 NG/ML
ALBUMIN SERPL BCP-MCNC: 4.2 G/DL
ALP SERPL-CCNC: 106 U/L
ALT SERPL W/O P-5'-P-CCNC: 25 U/L
ANION GAP SERPL CALC-SCNC: 9 MMOL/L
AST SERPL-CCNC: 20 U/L
BASOPHILS # BLD AUTO: 0.04 K/UL
BASOPHILS NFR BLD: 1 %
BILIRUB SERPL-MCNC: 0.6 MG/DL
BUN SERPL-MCNC: 15 MG/DL
CALCIUM SERPL-MCNC: 11.1 MG/DL
CHLORIDE SERPL-SCNC: 111 MMOL/L
CHOLEST SERPL-MCNC: 126 MG/DL
CHOLEST/HDLC SERPL: 2.4 {RATIO}
CK SERPL-CCNC: 88 U/L
CO2 SERPL-SCNC: 23 MMOL/L
CREAT SERPL-MCNC: 0.8 MG/DL
DIFFERENTIAL METHOD: ABNORMAL
EOSINOPHIL # BLD AUTO: 0.1 K/UL
EOSINOPHIL NFR BLD: 3.2 %
ERYTHROCYTE [DISTWIDTH] IN BLOOD BY AUTOMATED COUNT: 14.8 %
EST. GFR  (AFRICAN AMERICAN): >60 ML/MIN/1.73 M^2
EST. GFR  (NON AFRICAN AMERICAN): >60 ML/MIN/1.73 M^2
GLUCOSE SERPL-MCNC: 86 MG/DL
HCT VFR BLD AUTO: 48.1 %
HDLC SERPL-MCNC: 53 MG/DL
HDLC SERPL: 42.1 %
HGB BLD-MCNC: 15.4 G/DL
IMM GRANULOCYTES # BLD AUTO: 0.01 K/UL
IMM GRANULOCYTES NFR BLD AUTO: 0.2 %
LDLC SERPL CALC-MCNC: 62.6 MG/DL
LYMPHOCYTES # BLD AUTO: 1.3 K/UL
LYMPHOCYTES NFR BLD: 33.1 %
MCH RBC QN AUTO: 29 PG
MCHC RBC AUTO-ENTMCNC: 32 G/DL
MCV RBC AUTO: 91 FL
MONOCYTES # BLD AUTO: 0.4 K/UL
MONOCYTES NFR BLD: 10.9 %
NEUTROPHILS # BLD AUTO: 2.1 K/UL
NEUTROPHILS NFR BLD: 51.6 %
NONHDLC SERPL-MCNC: 73 MG/DL
NRBC BLD-RTO: 0 /100 WBC
PLATELET # BLD AUTO: 228 K/UL
PMV BLD AUTO: 10.4 FL
POTASSIUM SERPL-SCNC: 4.4 MMOL/L
PROT SERPL-MCNC: 7.2 G/DL
RBC # BLD AUTO: 5.31 M/UL
SODIUM SERPL-SCNC: 143 MMOL/L
TRIGL SERPL-MCNC: 52 MG/DL
TSH SERPL DL<=0.005 MIU/L-ACNC: 1.36 UIU/ML
WBC # BLD AUTO: 4.02 K/UL

## 2018-08-06 PROCEDURE — 82306 VITAMIN D 25 HYDROXY: CPT

## 2018-08-06 PROCEDURE — 85025 COMPLETE CBC W/AUTO DIFF WBC: CPT

## 2018-08-06 PROCEDURE — 36415 COLL VENOUS BLD VENIPUNCTURE: CPT

## 2018-08-06 PROCEDURE — 80061 LIPID PANEL: CPT

## 2018-08-06 PROCEDURE — 82550 ASSAY OF CK (CPK): CPT

## 2018-08-06 PROCEDURE — 80053 COMPREHEN METABOLIC PANEL: CPT

## 2018-08-06 PROCEDURE — 84443 ASSAY THYROID STIM HORMONE: CPT

## 2018-08-07 ENCOUNTER — TELEPHONE (OUTPATIENT)
Dept: INTERNAL MEDICINE | Facility: CLINIC | Age: 58
End: 2018-08-07

## 2018-08-07 ENCOUNTER — PATIENT MESSAGE (OUTPATIENT)
Dept: INTERNAL MEDICINE | Facility: CLINIC | Age: 58
End: 2018-08-07

## 2018-08-07 DIAGNOSIS — E83.52 HYPERCALCEMIA: ICD-10-CM

## 2018-08-07 NOTE — TELEPHONE ENCOUNTER
Spoke with pt, notified. Pt verbalized understanding. Lab appt made. Unable to schedule endo, pt is given phone number, will call to schedule.

## 2018-08-07 NOTE — TELEPHONE ENCOUNTER
Labs show very elevated calcium for reasons that are not clear.  She needs to have a parathyroid level done, and I would also recommend an endocrinology consult.  Orders are in for both.      In the meanwhile, please make sure that she is not taking any calcium supplementation over-the-counter.

## 2018-08-13 ENCOUNTER — LAB VISIT (OUTPATIENT)
Dept: LAB | Facility: HOSPITAL | Age: 58
End: 2018-08-13
Attending: INTERNAL MEDICINE
Payer: COMMERCIAL

## 2018-08-13 DIAGNOSIS — E83.52 HYPERCALCEMIA: ICD-10-CM

## 2018-08-13 LAB
ALBUMIN SERPL BCP-MCNC: 4.3 G/DL
ANION GAP SERPL CALC-SCNC: 9 MMOL/L
BUN SERPL-MCNC: 10 MG/DL
CALCIUM SERPL-MCNC: 10.6 MG/DL
CHLORIDE SERPL-SCNC: 110 MMOL/L
CO2 SERPL-SCNC: 23 MMOL/L
CREAT SERPL-MCNC: 0.8 MG/DL
EST. GFR  (AFRICAN AMERICAN): >60 ML/MIN/1.73 M^2
EST. GFR  (NON AFRICAN AMERICAN): >60 ML/MIN/1.73 M^2
GLUCOSE SERPL-MCNC: 94 MG/DL
PHOSPHATE SERPL-MCNC: 3.6 MG/DL
POTASSIUM SERPL-SCNC: 4.3 MMOL/L
PTH-INTACT SERPL-MCNC: 86 PG/ML
SODIUM SERPL-SCNC: 142 MMOL/L

## 2018-08-13 PROCEDURE — 36415 COLL VENOUS BLD VENIPUNCTURE: CPT | Mod: PN

## 2018-08-13 PROCEDURE — 83970 ASSAY OF PARATHORMONE: CPT

## 2018-08-13 PROCEDURE — 80069 RENAL FUNCTION PANEL: CPT

## 2018-08-15 ENCOUNTER — OFFICE VISIT (OUTPATIENT)
Dept: ENDOCRINOLOGY | Facility: CLINIC | Age: 58
End: 2018-08-15
Payer: COMMERCIAL

## 2018-08-15 ENCOUNTER — PATIENT MESSAGE (OUTPATIENT)
Dept: INTERNAL MEDICINE | Facility: CLINIC | Age: 58
End: 2018-08-15

## 2018-08-15 VITALS
BODY MASS INDEX: 21.76 KG/M2 | WEIGHT: 135.38 LBS | HEART RATE: 70 BPM | HEIGHT: 66 IN | DIASTOLIC BLOOD PRESSURE: 80 MMHG | SYSTOLIC BLOOD PRESSURE: 100 MMHG

## 2018-08-15 DIAGNOSIS — N20.0 KIDNEY STONES: ICD-10-CM

## 2018-08-15 DIAGNOSIS — E21.3 HYPERPARATHYROIDISM: Primary | ICD-10-CM

## 2018-08-15 DIAGNOSIS — E78.5 HYPERLIPIDEMIA, UNSPECIFIED HYPERLIPIDEMIA TYPE: ICD-10-CM

## 2018-08-15 DIAGNOSIS — E83.52 HYPERCALCEMIA: ICD-10-CM

## 2018-08-15 PROCEDURE — 3079F DIAST BP 80-89 MM HG: CPT | Mod: CPTII,S$GLB,, | Performed by: INTERNAL MEDICINE

## 2018-08-15 PROCEDURE — 99999 PR PBB SHADOW E&M-EST. PATIENT-LVL IV: CPT | Mod: PBBFAC,,, | Performed by: INTERNAL MEDICINE

## 2018-08-15 PROCEDURE — 99204 OFFICE O/P NEW MOD 45 MIN: CPT | Mod: S$GLB,,, | Performed by: INTERNAL MEDICINE

## 2018-08-15 PROCEDURE — 3008F BODY MASS INDEX DOCD: CPT | Mod: CPTII,S$GLB,, | Performed by: INTERNAL MEDICINE

## 2018-08-15 PROCEDURE — 3074F SYST BP LT 130 MM HG: CPT | Mod: CPTII,S$GLB,, | Performed by: INTERNAL MEDICINE

## 2018-08-15 NOTE — PROGRESS NOTES
Ms. Posada is a 58 y.o. F with history of kidney stones, thyroid nodules, HL,  here for new visit for hypercalcemia.    Referred by PCP after found to have high PTH and high calcium (below in Aug 2016 - PTH was 86 for calcium of 10.6, vitD 35 and GFR>60) - prior to 2018 had high-normal calciums but no overt hypercalcemia. No family hx of OP or calcium disorders.  Multiple episodes of kidney stones in the past that she passed spotaneously.  Takes vitamin D 1000 units daily  No Hx of thiazide of lithium use.   Broke toe but no other fractures.     Regarding thyroid nodules: TSH wnl mid 2018, most recent neck US below:  Aug 2017 thyroid US  Findings: The thyroid is symmetric.  The right lobe measures 5.4 x 1.9 x 2.2 cm.  The left lobe measures 5.5 x 1.9 x 2.1 cm.  Thyroid parenchyma is homogeneous.  7 nodules are identified, 4 in the right lobe and 2 in the left lobe and one in the isthmus. The nodules in the right thyroid gland measuring 9 x 7 x 9 mm, 4 x 3 x 4 mm, 6 x 4 x 3 mm and 4 x 3 x 4 mm. The nodule in the isthmus measures 4 x 2 x 3 mm. The nodules in the left thyroid lobe measure 4 x 3 x 4 mm and 4 x 3 x 4 mm. There is increased vascularity involving the left thyroid lobe.      Assessment and Plan:  Ms. Posada is a 58 y.o. F with history of kidney stones, thyroid nodules, here for new visit for hypercalcemia.    Hypercalcemia w high PTH: most likely primary hyperparathyroidism, less likely [facility]  FeCa w 24h urine collection and cmp  Parathyroid scan  Neck US   DXA  Meets criteria for surgery given repeated calcium stones (although we do not have a stone analysis to confirm they are calcium stones) - will await above results before making referral    Thyroid nodules: repeat US now    HL: controlled, continue lipitor    RTC in 4 months    Component      Latest Ref Rng & Units 8/13/2018 8/6/2018 7/11/2018 10/11/2017   Glucose      70 - 110 mg/dL 94 86 86    BUN, Bld      6 - 20 mg/dL 10 15 13    Creatinine       0.5 - 1.4 mg/dL 0.8 0.8 0.9    Calcium      8.7 - 10.5 mg/dL 10.6 (H) 11.1 (H) 11.0 (H)    Sodium      136 - 145 mmol/L 142 143 143    Potassium      3.5 - 5.1 mmol/L 4.3 4.4 4.4    Chloride      95 - 110 mmol/L 110 111 (H) 109    Total Protein      6.0 - 8.4 g/dL  7.2     Albumin      3.5 - 5.2 g/dL 4.3 4.2     Total Bilirubin      0.1 - 1.0 mg/dL  0.6     AST      10 - 40 U/L  20     Alkaline Phosphatase      55 - 135 U/L  106     CO2      23 - 29 mmol/L 23 23 25    ALT      10 - 44 U/L  25     Anion Gap      8 - 16 mmol/L 9 9 9    eGFR if non African American      >60 mL/min/1.73 m:2 >60.0 >60.0 >60.0    eGFR if African American      >60 mL/min/1.73 m:2 >60.0 >60.0 >60.0    Phosphorus      2.7 - 4.5 mg/dL 3.6      TSH      0.400 - 4.000 uIU/mL  1.357     Vit D, 25-Hydroxy      30 - 96 ng/mL  35  39   PTH      9.0 - 77.0 pg/mL 86.0 (H)        Component      Latest Ref Rng & Units 6/30/2017   Glucose      70 - 110 mg/dL    BUN, Bld      6 - 20 mg/dL    Creatinine      0.5 - 1.4 mg/dL    Calcium      8.7 - 10.5 mg/dL    Sodium      136 - 145 mmol/L    Potassium      3.5 - 5.1 mmol/L    Chloride      95 - 110 mmol/L    Total Protein      6.0 - 8.4 g/dL    Albumin      3.5 - 5.2 g/dL    Total Bilirubin      0.1 - 1.0 mg/dL    AST      10 - 40 U/L    Alkaline Phosphatase      55 - 135 U/L    CO2      23 - 29 mmol/L    ALT      10 - 44 U/L    Anion Gap      8 - 16 mmol/L    eGFR if non African American      >60 mL/min/1.73 m:2    eGFR if African American      >60 mL/min/1.73 m:2    Phosphorus      2.7 - 4.5 mg/dL    TSH      0.400 - 4.000 uIU/mL 1.040   Vit D, 25-Hydroxy      30 - 96 ng/mL 23 (L)   PTH      9.0 - 77.0 pg/mL          Past Medical History:   Diagnosis Date    Chronic kidney disease     Family history of colon cancer 12/4/2012    Hypercalcemia 8/7/2018    Hyperlipidemia     Hypertension     Kidney stones 12/4/2012    Menopause syndrome 12/4/2012    Thyroid nodule 12/4/2012    Vitamin D deficiency  "disease 2/3/2014        reports that  has never smoked. she has never used smokeless tobacco. She reports that she drinks alcohol. She reports that she does not use drugs.    Family History   Problem Relation Age of Onset    Heart disease Mother 45        smoker    Hypertension Mother     Heart disease Father     Kidney disease Father     Diabetes Father     Cancer Father         colon cancer    Hypertension Sister     Breast cancer Maternal Grandmother     Heart disease Maternal Grandfather        Past Surgical History:   Procedure Laterality Date    BREAST CYST EXCISION Right     CHOLECYSTECTOMY      HYSTERECTOMY         Patient's Medications   New Prescriptions    No medications on file   Previous Medications    AMLODIPINE (NORVASC) 10 MG TABLET    TAKE 1 TABLET(10 MG) BY MOUTH EVERY DAY    ATORVASTATIN (LIPITOR) 20 MG TABLET    TAKE 1 TABLET(20 MG) BY MOUTH EVERY DAY    BENAZEPRIL (LOTENSIN) 40 MG TABLET    TAKE 1 TABLET(40 MG) BY MOUTH EVERY DAY    CHOLECALCIFEROL, VITAMIN D3, 1,000 UNIT CAPSULE    Take 2 capsules (2,000 Units total) by mouth once daily.   Modified Medications    No medications on file   Discontinued Medications    No medications on file         Review of Systems:  General: no fevers  Eyes: no vision changes  ENT: no sore throat  Lung: no sob  CV: no palpitations  GI: no nausea   : no dysuria   Endo: no heat intolerance  Heme: no easy bruising   MSK: no joint swelling        /80   Pulse 70   Ht 5' 6" (1.676 m)   Wt 61.4 kg (135 lb 5.8 oz)   BMI 21.85 kg/m²   Physical Exam:  General: normal body habitus, not in acute distress   Eyes: anicteric, no proptosis   ENT: no facial lesions, no oral lesions   Neck: no masses, no LAD   Lung; ctabl, no wheezing or stridor   GI: normal bowel sounds, nondistended   CV: RRR, no rubs or murmurs   Skin: exposed skin without bruising or bleeding   Ext: no peripheral edema or erythema  Neuro: AOx3, moving all extremities, normal gait "     Labs:    Chemistry        Component Value Date/Time     08/13/2018 1015    K 4.3 08/13/2018 1015     08/13/2018 1015    CO2 23 08/13/2018 1015    BUN 10 08/13/2018 1015    CREATININE 0.8 08/13/2018 1015    GLU 94 08/13/2018 1015        Component Value Date/Time    CALCIUM 10.6 (H) 08/13/2018 1015    ALKPHOS 106 08/06/2018 0949    AST 20 08/06/2018 0949    ALT 25 08/06/2018 0949    BILITOT 0.6 08/06/2018 0949    ESTGFRAFRICA >60.0 08/13/2018 1015    EGFRNONAA >60.0 08/13/2018 1015          Lab Results   Component Value Date    WBC 4.02 08/06/2018    HGB 15.4 08/06/2018    HCT 48.1 08/06/2018    MCV 91 08/06/2018     08/06/2018        Lab Results   Component Value Date    HDL 53 08/06/2018    HDL 55 10/11/2017    HDL 53 06/30/2017     Lab Results   Component Value Date    LDLCALC 62.6 (L) 08/06/2018    LDLCALC 69.0 10/11/2017    LDLCALC 153.2 06/30/2017     Lab Results   Component Value Date    TRIG 52 08/06/2018    TRIG 65 10/11/2017    TRIG 99 06/30/2017     Lab Results   Component Value Date    CHOLHDL 42.1 08/06/2018    CHOLHDL 40.1 10/11/2017    CHOLHDL 23.5 06/30/2017       No results found for: HGBA1C    Lab Results   Component Value Date    TSH 1.357 08/06/2018

## 2018-08-15 NOTE — LETTER
August 15, 2018      Lizzy Joya MD  [address]  [facility] [address]           [facility] - Endo/Diab/Metab  [address]  [facility] [place] [facility] [address]  Phone: [phone]  Fax: [phone]          Patient: Latia Posada   MR Number: [id number]   YOB: 1960   Date of Visit: 8/15/2018       Dear Dr. Lizzy Joya:    Thank you for referring Latia Posada to me for evaluation. Attached you will find relevant portions of my assessment and plan of care.    If you have questions, please do not hesitate to call me. I look forward to following Latia Posada along with you.    Sincerely,    Jason Anderson MD    Enclosure  CC:  No Recipients    If you would like to receive this communication electronically, please contact [email or web address] or [phone] to request more information on [employer] access.    For providers and/or their staff who would like to refer a patient to [facility], please contact us through our one-stop-shop provider referral line, [facility], at [phone].    If you feel you have received this communication in error or would no longer like to receive these types of communications, please e-mail [email or web address]

## 2018-08-15 NOTE — PATIENT INSTRUCTIONS
24hr urine collection => return to lab => do lab draw at the same time    Primary hyperparathyroidism

## 2018-08-29 ENCOUNTER — HOSPITAL ENCOUNTER (OUTPATIENT)
Dept: RADIOLOGY | Facility: HOSPITAL | Age: 58
Discharge: HOME | End: 2018-08-29
Attending: INTERNAL MEDICINE
Payer: COMMERCIAL

## 2018-08-29 ENCOUNTER — PATIENT MESSAGE (OUTPATIENT)
Dept: ENDOCRINOLOGY | Facility: CLINIC | Age: 58
End: 2018-08-29

## 2018-08-29 ENCOUNTER — HOSPITAL ENCOUNTER (OUTPATIENT)
Dept: RADIOLOGY | Facility: CLINIC | Age: 58
Discharge: HOME | End: 2018-08-29
Attending: INTERNAL MEDICINE
Payer: COMMERCIAL

## 2018-08-29 DIAGNOSIS — E21.3 HYPERPARATHYROIDISM: ICD-10-CM

## 2018-08-29 DIAGNOSIS — E21.3 HYPERPARATHYROIDISM: Primary | ICD-10-CM

## 2018-08-29 PROCEDURE — 76536 US EXAM OF HEAD AND NECK: CPT | Mod: 26,,, | Performed by: RADIOLOGY

## 2018-08-29 PROCEDURE — 77080 DXA BONE DENSITY AXIAL: CPT | Mod: 26,,, | Performed by: INTERNAL MEDICINE

## 2018-08-29 PROCEDURE — 77080 DXA BONE DENSITY AXIAL: CPT | Mod: TC

## 2018-08-29 PROCEDURE — 76536 US EXAM OF HEAD AND NECK: CPT | Mod: TC

## 2018-08-29 PROCEDURE — A9500 TC99M SESTAMIBI: HCPCS

## 2018-08-29 PROCEDURE — 78071 PARATHYRD PLANAR W/WO SUBTRJ: CPT | Mod: 26,,, | Performed by: RADIOLOGY

## 2018-09-05 ENCOUNTER — TELEPHONE (OUTPATIENT)
Dept: SURGERY | Facility: CLINIC | Age: 58
End: 2018-09-05

## 2018-09-05 NOTE — TELEPHONE ENCOUNTER
Called pt to schedule her to see Dr. Bonilla for Hyperparathyroidism as referred by Dr. Anderson. Called home and mobile. No answer. Left a message. Will send a message via [facility]. Will await a return call and follow-up as needed.

## 2018-09-07 DIAGNOSIS — I10 ESSENTIAL HYPERTENSION: ICD-10-CM

## 2018-09-07 RX ORDER — BENAZEPRIL HYDROCHLORIDE 40 MG/1
TABLET ORAL
Qty: 90 TABLET | Refills: 0 | Status: SHIPPED | OUTPATIENT
Start: 2018-09-07 | End: 2018-12-20 | Stop reason: SDUPTHER

## 2018-09-07 RX ORDER — AMLODIPINE BESYLATE 10 MG/1
TABLET ORAL
Qty: 90 TABLET | Refills: 0 | Status: SHIPPED | OUTPATIENT
Start: 2018-09-07 | End: 2018-12-20 | Stop reason: SDUPTHER

## 2018-10-17 ENCOUNTER — OFFICE VISIT (OUTPATIENT)
Dept: PRIMARY CARE CLINIC | Facility: CLINIC | Age: 58
End: 2018-10-17
Attending: FAMILY MEDICINE
Payer: COMMERCIAL

## 2018-10-17 VITALS
HEART RATE: 83 BPM | SYSTOLIC BLOOD PRESSURE: 128 MMHG | WEIGHT: 136.38 LBS | BODY MASS INDEX: 22.01 KG/M2 | DIASTOLIC BLOOD PRESSURE: 88 MMHG

## 2018-10-17 DIAGNOSIS — M67.431 GANGLION CYST OF BOTH WRISTS: Primary | ICD-10-CM

## 2018-10-17 DIAGNOSIS — M67.432 GANGLION CYST OF BOTH WRISTS: Primary | ICD-10-CM

## 2018-10-17 PROCEDURE — 3008F BODY MASS INDEX DOCD: CPT | Mod: CPTII,S$GLB,, | Performed by: FAMILY MEDICINE

## 2018-10-17 PROCEDURE — 99999 PR PBB SHADOW E&M-EST. PATIENT-LVL III: CPT | Mod: PBBFAC,,, | Performed by: FAMILY MEDICINE

## 2018-10-17 PROCEDURE — 3074F SYST BP LT 130 MM HG: CPT | Mod: CPTII,S$GLB,, | Performed by: FAMILY MEDICINE

## 2018-10-17 PROCEDURE — 3079F DIAST BP 80-89 MM HG: CPT | Mod: CPTII,S$GLB,, | Performed by: FAMILY MEDICINE

## 2018-10-17 PROCEDURE — 99214 OFFICE O/P EST MOD 30 MIN: CPT | Mod: S$GLB,,, | Performed by: FAMILY MEDICINE

## 2018-10-17 NOTE — PATIENT INSTRUCTIONS
Ganglion Cyst    A ganglion cyst usually is a painless bump on the wrist or finger joint. It connects to the joint capsule and grows like a balloon on a stalk. It is filled with joint fluid. The cause of a ganglion cyst is not known.   If the cyst puts pressure on a nearby nerve it may cause pain. Otherwise, cysts are painless and harmless. Most tend to disappear over time without treatment. Do not try to drain or break the cyst at home. This can cause harm and does not cure the problem.  If you are having pain from the cyst, a temporary wrist splint may be helpful to limit wrist motion. If this does not help, the fluid can be removed from the cyst. This should shrink the size of the cyst. If this doesnt give relief, the ganglion can be removed by surgery.  Home care  · If you are having wrist pain, use a wrist splint for 1-2 weeks at a time. You can buy one at many drug stores without a prescription.  · You may use over-the-counter pain medicine to control pain, unless another medicine was prescribed. If you have chronic liver or kidney disease or ever had a stomach ulcer or GI bleeding, talk with your healthcare provider before using these medicines.    · If a needle was used to drain the cyst fluid or inject medicine into it, keep the site clean and covered with a bandage for the first 24 hours. If a pressure dressing was applied, leave it in place for the time advised.  Follow-up care  Follow up with your healthcare provider, or as advised by our staff. Make an appointment for a repeat exam if pain continues for more than 2 weeks in a wrist splint.  When to seek medical advice  Call your healthcare provider right away if any of these occur:  · Increasing pain in the wrist  · Redness over the cyst  · Fluid draining from the cyst  · Numbness or tingling in the hand or arm  Date Last Reviewed: 11/20/2015  © 0771-1525 [employer]. [address] [place] [address], [place] [address]. All rights reserved. This  information is not intended as a substitute for professional medical care. Always follow your healthcare professional's instructions.        Ganglion Cyst: Hand    A ganglion cyst is a firm, fluid-filled lump that can suddenly appear on the front or back of the wrist or at the base of a finger. These cysts grow from normal tissue in the wrist and fingers, and range in size from a pea to a peach pit. Although ganglion cysts are common, they dont spread, and they dont become cancerous. They can occur after an injury, but many times it isnt known why they grow. Ganglion cysts can change in size, and may go away on their own.  Symptoms  A ganglion cyst is sometimes painful, especially when it first occurs. Constantly using your hand or wrist can make the cyst enlarge and hurt more. Some hand and wrist movements, such as grasping things, may also be difficult.  How a ganglion cyst develops  Your wrist and hand are made up of many small bones that meet at joints. Tendons attach muscles to the bones at the joints. The tendons allow the joints to bend and straighten. Both tendons and joints are lined with tissue called synovium. This tissue makes a thick fluid that keeps the joints and tendons moving easily. Sometimes the tissue balloons out from the joint or tendons and forms a cyst. As the cyst fills with fluid and grows, it appears as a lump you can feel.  Where ganglion cysts occur  A ganglion cyst can occur anywhere on the hand near a joint. Cysts most commonly appear on the back or palm side of the wrist, or on the palm at the base of a finger. Your doctor can usually diagnose a cyst by examining the lump. He or she may draw off a little fluid or order an X-ray to rule out other problems.  Treating a ganglion cyst  Your healthcare provider may just watch your ganglion cyst. Many shrink and become painless without treatment. Some disappear altogether. If the cyst is unsightly or painful, or makes it hard for you to  use your hand, your healthcare provider can treat it or, if needed, remove it surgically.  Nonsurgical treatment  To shrink the cyst, your provider may remove (aspirate) the fluid with a needle. If the cyst hurts, your provider may also give you an injection of an anti-inflammatory, such as cortisone, to relieve the irritation. Your hand may then be wrapped to help keep the cyst from recurring.  Surgery  If the cyst reappears after treatment, your healthcare provider may remove it surgically. A section of the tissue that lines the joint or tendon is removed along with the cyst. This helps prevent another cyst from forming, although recurrence of the cyst is still possible after surgery. Usually, only your hand or arm is numbed, and you can go home a few hours after surgery. Your hand may be in a splint for several days.  Date Last Reviewed: 9/10/2015  © 6047-6590 [employer]. [address] [place] [address], [place] [address]. All rights reserved. This information is not intended as a substitute for professional medical care. Always follow your healthcare professional's instructions.

## 2018-10-17 NOTE — PROGRESS NOTES
Subjective:       Patient ID: Latia Posada is a 58 y.o. female.    Chief Complaint: Cyst (left wrist)    Pt of Dr. Joya's presents today for b/l wrists cysts that have been there for few months but more noticeable for pt this last week. Pt denies any pain. Denies any numbness or tingling or weakness in     Pt also needs flu shot today      Review of Systems   Constitutional: Negative.    Eyes: Negative.    Respiratory: Negative for cough, chest tightness and shortness of breath.    Cardiovascular: Negative for chest pain, palpitations and leg swelling.   Gastrointestinal: Negative.    Musculoskeletal: Negative.  Negative for joint swelling.        Cyst b/l wrists   Skin: Negative.    Neurological: Negative for dizziness, weakness, light-headedness and headaches.       Objective:      Physical Exam   Constitutional: She is oriented to person, place, and time. She appears well-developed and well-nourished.   HENT:   Head: Normocephalic and atraumatic.   Eyes: Conjunctivae and EOM are normal. Pupils are equal, round, and reactive to light.   Neck: Normal range of motion. Neck supple. No thyromegaly present.   Cardiovascular: Normal rate, regular rhythm, normal heart sounds and intact distal pulses.   No murmur heard.  Pulmonary/Chest: Effort normal and breath sounds normal. No respiratory distress. She has no wheezes. She has no rales. She exhibits no tenderness.   Musculoskeletal: Normal range of motion. She exhibits no edema or tenderness.   Lymphadenopathy:     She has no cervical adenopathy.   Neurological: She is alert and oriented to person, place, and time. She displays normal reflexes. No cranial nerve deficit or sensory deficit. She exhibits normal muscle tone. Coordination normal.   B/l wrists cysts, smooth nonmobile, NTTP    Skin: Skin is warm. No erythema.   Psychiatric: She has a normal mood and affect. Her behavior is normal. Judgment and thought content normal.       Assessment:       1. Ganglion  cyst of both wrists        Plan:       Cysts b/l wrists: pt prefers to have a consult with hand  Flu shot today    Ganglion cyst of both wrists  -     Ambulatory referral to Hand Surgery      RTC prn

## 2018-10-26 ENCOUNTER — TELEPHONE (OUTPATIENT)
Dept: ORTHOPEDICS | Facility: CLINIC | Age: 58
End: 2018-10-26

## 2018-10-26 DIAGNOSIS — R52 PAIN: Primary | ICD-10-CM

## 2018-10-26 NOTE — TELEPHONE ENCOUNTER
Left a v/m confirming patients appt on 10/29/2018. Also requested the patient go for an x-ray at 8:45 am prior to her appt in the office. Left call back number for any questions.

## 2018-10-29 ENCOUNTER — OFFICE VISIT (OUTPATIENT)
Dept: ORTHOPEDICS | Facility: CLINIC | Age: 58
End: 2018-10-29
Attending: FAMILY MEDICINE
Payer: COMMERCIAL

## 2018-10-29 ENCOUNTER — HOSPITAL ENCOUNTER (OUTPATIENT)
Dept: RADIOLOGY | Facility: OTHER | Age: 58
Discharge: HOME | End: 2018-10-29
Attending: ORTHOPAEDIC SURGERY
Payer: COMMERCIAL

## 2018-10-29 VITALS
HEART RATE: 84 BPM | SYSTOLIC BLOOD PRESSURE: 142 MMHG | BODY MASS INDEX: 21.9 KG/M2 | DIASTOLIC BLOOD PRESSURE: 89 MMHG | HEIGHT: 66 IN | WEIGHT: 136.25 LBS

## 2018-10-29 DIAGNOSIS — M67.432 GANGLION CYST OF DORSUM OF LEFT WRIST: Primary | ICD-10-CM

## 2018-10-29 DIAGNOSIS — R52 PAIN: ICD-10-CM

## 2018-10-29 DIAGNOSIS — M67.431 GANGLION CYST OF DORSUM OF RIGHT WRIST: ICD-10-CM

## 2018-10-29 PROCEDURE — 3079F DIAST BP 80-89 MM HG: CPT | Mod: CPTII,S$GLB,, | Performed by: ORTHOPAEDIC SURGERY

## 2018-10-29 PROCEDURE — 99203 OFFICE O/P NEW LOW 30 MIN: CPT | Mod: 25,S$GLB,, | Performed by: ORTHOPAEDIC SURGERY

## 2018-10-29 PROCEDURE — 3008F BODY MASS INDEX DOCD: CPT | Mod: CPTII,S$GLB,, | Performed by: ORTHOPAEDIC SURGERY

## 2018-10-29 PROCEDURE — 20605 DRAIN/INJ JOINT/BURSA W/O US: CPT | Mod: LT,S$GLB,, | Performed by: ORTHOPAEDIC SURGERY

## 2018-10-29 PROCEDURE — 99999 PR PBB SHADOW E&M-EST. PATIENT-LVL III: CPT | Mod: PBBFAC,,, | Performed by: ORTHOPAEDIC SURGERY

## 2018-10-29 PROCEDURE — 73130 X-RAY EXAM OF HAND: CPT | Mod: 50,TC,FY

## 2018-10-29 PROCEDURE — 73130 X-RAY EXAM OF HAND: CPT | Mod: 26,50,, | Performed by: RADIOLOGY

## 2018-10-29 PROCEDURE — 3077F SYST BP >= 140 MM HG: CPT | Mod: CPTII,S$GLB,, | Performed by: ORTHOPAEDIC SURGERY

## 2018-10-29 NOTE — PROCEDURES
Intermediate Joint Aspiration/Injection  Date/Time: 10/29/2018 10:02 AM  Performed by: Henry Melendez MD  Authorized by: Henry Melendez MD     Consent Done?: Yes (Verbal)  Indications: ganglion cyst left wrist.  Site marked: The procedure site was marked    Timeout: Prior to procedure the correct patient, procedure, and site was verified      Location:  Wrist  Wrist joint: left dorsal wrist cyst.  Prep: Patient was prepped and draped in usual sterile fashion    Ultrasonic Guidance for needle placement: No  Needle size:  18 G  Approach:  Dorsal  Aspirate amount (ml):  2  Aspirate:  Yellow and clear  Patient tolerance:  Patient tolerated the procedure well with no immediate complications

## 2018-10-29 NOTE — LETTER
October 29, 2018      Veronica Proctor MD  [address]   [address]  [facility] [address]           [facility]  [address]  [facility] [address]  Phone: [phone]          Patient: Latia Posada   MR Number: [id number]   YOB: 1960   Date of Visit: 10/29/2018       Dear Dr. Veronica Proctor:    Thank you for referring Latia Posada to me for evaluation. Attached you will find relevant portions of my assessment and plan of care.    If you have questions, please do not hesitate to call me. I look forward to following Latia Posada along with you.    Sincerely,    Henry Melendez MD    Enclosure  CC:  No Recipients    If you would like to receive this communication electronically, please contact [email or web address] or [phone] to request more information on [employer] Link access.    For providers and/or their staff who would like to refer a patient to [facility], please contact us through our one-stop-shop provider referral line, [facility], at [phone].    If you feel you have received this communication in error or would no longer like to receive these types of communications, please e-mail [email or web address]

## 2018-10-29 NOTE — PROGRESS NOTES
[facility]  History & Physical  Orthopedics    SUBJECTIVE:      Chief Complaint: Julio César wrist cysts    Referring Provider: Veronica Proctor MD       History of Present Illness:  Patient is a 58 y.o. right hand dominant female who presents today with complaints of julio césar wrist cysts.     The patient is a/an .    Onset of symptoms/DOI was 1 month ago.    Symptoms are aggravated by movement.    Symptoms are alleviated by rest.    Symptoms consist of swelling.    The patient rates their pain as a 0/10.    Attempted treatment(s) and/or interventions include nothing.     The patient denies any fevers, chills, N/V, D/C and presents for evaluation.       Past Medical History:   Diagnosis Date    Chronic kidney disease     Family history of colon cancer 12/4/2012    Hypercalcemia 8/7/2018    Hyperlipidemia     Hypertension     Kidney stones 12/4/2012    Menopause syndrome 12/4/2012    Thyroid nodule 12/4/2012    Vitamin D deficiency disease 2/3/2014     Past Surgical History:   Procedure Laterality Date    BREAST CYST EXCISION Right     CHOLECYSTECTOMY      COLONOSCOPY N/A 4/26/2014    Performed by Karson Goodman MD at [facility] ([address] [place] [address])    HYSTERECTOMY       Review of patient's allergies indicates:   Allergen Reactions    Lactose      Cramping, diarrhea     Social History     Social History Narrative    Not on file     Family History   Problem Relation Age of Onset    Heart disease Mother 45        smoker    Hypertension Mother     Heart disease Father     Kidney disease Father     Diabetes Father     Cancer Father         colon cancer    Hypertension Sister     Breast cancer Maternal Grandmother     Heart disease Maternal Grandfather          Current Outpatient Medications:     amLODIPine (NORVASC) 10 MG tablet, TAKE 1 TABLET(10 MG) BY MOUTH EVERY DAY, Disp: 90 tablet, Rfl: 0    atorvastatin (LIPITOR) 20 MG tablet, TAKE 1 TABLET(20 MG) BY MOUTH EVERY DAY, Disp: 90  "tablet, Rfl: 3    benazepril (LOTENSIN) 40 MG tablet, TAKE 1 TABLET(40 MG) BY MOUTH EVERY DAY, Disp: 90 tablet, Rfl: 0    cholecalciferol, vitamin D3, 1,000 unit capsule, Take 2 capsules (2,000 Units total) by mouth once daily. (Patient taking differently: Take 1,000 Units by mouth once daily. ), Disp: 60 capsule, Rfl: 12      Review of Systems:  Constitutional: no fever or chills  Eyes: no visual changes  ENT: no nasal congestion or sore throat  Respiratory: no cough or shortness of breath  Cardiovascular: no chest pain  Gastrointestinal: no nausea or vomiting, tolerating diet  Musculoskeletal: cysts    OBJECTIVE:      Vital Signs (Most Recent):  Vitals:    10/29/18 0915   BP: (!) 142/89   BP Location: Right arm   Patient Position: Sitting   BP Method: Medium (Automatic)   Pulse: 84   Weight: 61.8 kg (136 lb 3.9 oz)   Height: 5' 6" (1.676 m)     Body mass index is 21.99 kg/m².      Physical Exam:  Constitutional: The patient appears well-developed and well-nourished. No distress.   Head: Normocephalic and atraumatic.   Nose: Nose normal.   Eyes: Conjunctivae and EOM are normal.   Neck: No tracheal deviation present.   Cardiovascular: Normal rate and intact distal pulses.    Pulmonary/Chest: Effort normal. No respiratory distress.   Abdominal: There is no guarding.   Neurological: The patient is alert.   Psychiatric: The patient has a normal mood and affect.     Bilateral Hand/Wrist Examination:    Observation/Inspection:  Swelling  B/l dorsal wrist ganglions, L>R    Deformity  none  Discoloration  none     Scars   none    Atrophy  none    HAND/WRIST EXAMINATION:  Finkelstein's Test   Neg  Snuff box tenderness   Neg  Lei's Test    Neg  Hook of Hamate Tenderness  Neg  CMC grind    Neg  Circumduction test   Neg    Neurovascular Exam:  Digits WWP, brisk CR < 3s throughout  NVI motor/LTS to M/R/U nerves, radial pulse 2+  Tinel's Test - Carpal Tunnel  Neg  Tinel's Test - Cubital Tunnel  Neg  Phalen's " Test    Neg  Median Nerve Compression Test Neg    ROM hand/wrist/elbow full, painless      Diagnostic Results:     Xray - no acute findings or sig DJD mike wrist  EMG - none    ASSESSMENT/PLAN:      58 y.o. yo female with mike dorsal wrist ganglion cysts  1) Left ganglion aspiration  2) F/U 6 weeks for reevaluation          Henry Melendez M.D.

## 2018-12-07 ENCOUNTER — TELEPHONE (OUTPATIENT)
Dept: ORTHOPEDICS | Facility: CLINIC | Age: 58
End: 2018-12-07

## 2018-12-07 NOTE — TELEPHONE ENCOUNTER
Called patient to confirm appt on 12/10/2018 with Dr Melendez at [facility]. Left a detailed message with a call back number for any questions about appt time or location.

## 2018-12-10 ENCOUNTER — OFFICE VISIT (OUTPATIENT)
Dept: ORTHOPEDICS | Facility: CLINIC | Age: 58
End: 2018-12-10
Payer: COMMERCIAL

## 2018-12-10 VITALS
HEIGHT: 66 IN | BODY MASS INDEX: 21.9 KG/M2 | WEIGHT: 136.25 LBS | HEART RATE: 78 BPM | SYSTOLIC BLOOD PRESSURE: 125 MMHG | DIASTOLIC BLOOD PRESSURE: 81 MMHG

## 2018-12-10 DIAGNOSIS — M67.432 GANGLION CYST OF DORSUM OF LEFT WRIST: Primary | ICD-10-CM

## 2018-12-10 PROCEDURE — 3074F SYST BP LT 130 MM HG: CPT | Mod: CPTII,S$GLB,, | Performed by: ORTHOPAEDIC SURGERY

## 2018-12-10 PROCEDURE — 3008F BODY MASS INDEX DOCD: CPT | Mod: CPTII,S$GLB,, | Performed by: ORTHOPAEDIC SURGERY

## 2018-12-10 PROCEDURE — 99212 OFFICE O/P EST SF 10 MIN: CPT | Mod: S$GLB,,, | Performed by: ORTHOPAEDIC SURGERY

## 2018-12-10 PROCEDURE — 3079F DIAST BP 80-89 MM HG: CPT | Mod: CPTII,S$GLB,, | Performed by: ORTHOPAEDIC SURGERY

## 2018-12-10 PROCEDURE — 99999 PR PBB SHADOW E&M-EST. PATIENT-LVL III: CPT | Mod: PBBFAC,,, | Performed by: ORTHOPAEDIC SURGERY

## 2018-12-10 NOTE — PROGRESS NOTES
[facility]  History & Physical  Orthopedics    SUBJECTIVE:      Chief Complaint: Julio César wrist cysts    Referring Provider: No ref. provider found       History of Present Illness:  Patient is a 58 y.o. right hand dominant female who presents today with complaints of julio césar wrist cysts.     Interval history 12/10/18: Pt returns today for followup.  She had a left dorsal ganglion cyst aspirated at last visit.  She reports it returned in ~2 weeks.  Not painful at this time, however.  No new complaints or other interval changes.    The patient is a/an .    Onset of symptoms/DOI was 1 month ago.    Symptoms are aggravated by movement.    Symptoms are alleviated by rest.    Symptoms consist of swelling.    The patient rates their pain as a 0/10.    Attempted treatment(s) and/or interventions include nothing.     The patient denies any fevers, chills, N/V, D/C and presents for evaluation.       Past Medical History:   Diagnosis Date    Chronic kidney disease     Family history of colon cancer 12/4/2012    Hypercalcemia 8/7/2018    Hyperlipidemia     Hypertension     Kidney stones 12/4/2012    Menopause syndrome 12/4/2012    Thyroid nodule 12/4/2012    Vitamin D deficiency disease 2/3/2014     Past Surgical History:   Procedure Laterality Date    BREAST CYST EXCISION Right     CHOLECYSTECTOMY      COLONOSCOPY N/A 4/26/2014    [facility]formed [facility] at [facility])    HYSTERECTOMY       Review of patient's allergies indicates:   Allergen Reactions    Lactose      Cramping, diarrhea     Social History     Social History Narrative    Not on file     Family History   Problem Relation Age of Onset    Heart disease Mother 45        smoker    Hypertension Mother     Heart disease Father     Kidney disease Father     Diabetes Father     Cancer Father         colon cancer    Hypertension Sister     Breast cancer Maternal Grandmother     Heart disease Maternal  "Grandfather          Current Outpatient Medications:     amLODIPine (NORVASC) 10 MG tablet, TAKE 1 TABLET(10 MG) BY MOUTH EVERY DAY, Disp: 90 tablet, Rfl: 0    atorvastatin (LIPITOR) 20 MG tablet, TAKE 1 TABLET(20 MG) BY MOUTH EVERY DAY, Disp: 90 tablet, Rfl: 3    benazepril (LOTENSIN) 40 MG tablet, TAKE 1 TABLET(40 MG) BY MOUTH EVERY DAY, Disp: 90 tablet, Rfl: 0    cholecalciferol, vitamin D3, 1,000 unit capsule, Take 2 capsules (2,000 Units total) by mouth once daily. (Patient taking differently: Take 1,000 Units by mouth once daily. ), Disp: 60 capsule, Rfl: 12      Review of Systems:  Constitutional: no fever or chills  Eyes: no visual changes  ENT: no nasal congestion or sore throat  Respiratory: no cough or shortness of breath  Cardiovascular: no chest pain  Gastrointestinal: no nausea or vomiting, tolerating diet  Musculoskeletal: cysts    OBJECTIVE:      Vital Signs (Most Recent):  Vitals:    12/10/18 0911   BP: 125/81   BP Location: Right arm   Patient Position: Sitting   BP Method: Medium (Automatic)   Pulse: 78   Weight: 61.8 kg (136 lb 3.9 oz)   Height: 5' 6" (1.676 m)     Body mass index is 21.99 kg/m².      Physical Exam:  Constitutional: The patient appears well-developed and well-nourished. No distress.   Head: Normocephalic and atraumatic.   Nose: Nose normal.   Eyes: Conjunctivae and EOM are normal.   Neck: No tracheal deviation present.   Cardiovascular: Normal rate and intact distal pulses.    Pulmonary/Chest: Effort normal. No respiratory distress.   Abdominal: There is no guarding.   Neurological: The patient is alert.   Psychiatric: The patient has a normal mood and affect.     Bilateral Hand/Wrist Examination:    Observation/Inspection:  Swelling  B/l dorsal wrist ganglions, L>R    Deformity  none  Discoloration  none     Scars   none    Atrophy  none    HAND/WRIST EXAMINATION:  Finkelstein's Test   Neg  Snuff box tenderness   Neg  Lei's Test    Neg  Hook of Hamate Tenderness  Neg  CMC " grind    Neg  Circumduction test   Neg    Neurovascular Exam:  Digits WWP, brisk CR < 3s throughout  NVI motor/LTS to M/R/U nerves, radial pulse 2+  Tinel's Test - Carpal Tunnel  Neg  Tinel's Test - Cubital Tunnel  Neg  Phalen's Test    Neg  Median Nerve Compression Test Neg    ROM hand/wrist/elbow full, painless      Diagnostic Results:     Xray - no acute findings or sig DJD mike wrist  EMG - none    ASSESSMENT/PLAN:      58 y.o. yo female with mike dorsal wrist ganglion cysts  1) Left cyst aspirated previously, now returned  2) Discussed observation vs excision with patient.  She would like to defer surgical intervention at this time.  3) F/U if cyst changes, becomes symptomatic, or should patient want to proceed with excision        Henry Melendez M.D.

## 2018-12-20 DIAGNOSIS — I10 ESSENTIAL HYPERTENSION: ICD-10-CM

## 2018-12-20 RX ORDER — AMLODIPINE BESYLATE 10 MG/1
TABLET ORAL
Qty: 90 TABLET | Refills: 0 | Status: SHIPPED | OUTPATIENT
Start: 2018-12-20 | End: 2019-03-21 | Stop reason: SDUPTHER

## 2018-12-20 RX ORDER — BENAZEPRIL HYDROCHLORIDE 40 MG/1
TABLET ORAL
Qty: 90 TABLET | Refills: 0 | Status: SHIPPED | OUTPATIENT
Start: 2018-12-20 | End: 2019-03-21 | Stop reason: SDUPTHER

## 2019-03-21 DIAGNOSIS — I10 ESSENTIAL HYPERTENSION: ICD-10-CM

## 2019-03-21 RX ORDER — AMLODIPINE BESYLATE 10 MG/1
TABLET ORAL
Qty: 90 TABLET | Refills: 0 | Status: SHIPPED | OUTPATIENT
Start: 2019-03-21 | End: 2019-06-15 | Stop reason: SDUPTHER

## 2019-03-21 RX ORDER — BENAZEPRIL HYDROCHLORIDE 40 MG/1
TABLET ORAL
Qty: 90 TABLET | Refills: 0 | Status: SHIPPED | OUTPATIENT
Start: 2019-03-21 | End: 2019-06-15 | Stop reason: SDUPTHER

## 2019-06-06 DIAGNOSIS — I10 ESSENTIAL HYPERTENSION: Primary | ICD-10-CM

## 2019-06-15 DIAGNOSIS — I10 ESSENTIAL HYPERTENSION: ICD-10-CM

## 2019-06-16 RX ORDER — ATORVASTATIN CALCIUM 20 MG/1
TABLET, FILM COATED ORAL
Qty: 90 TABLET | Refills: 0 | Status: SHIPPED | OUTPATIENT
Start: 2019-06-16 | End: 2019-07-30 | Stop reason: SDUPTHER

## 2019-06-17 RX ORDER — BENAZEPRIL HYDROCHLORIDE 40 MG/1
TABLET ORAL
Qty: 90 TABLET | Refills: 0 | Status: SHIPPED | OUTPATIENT
Start: 2019-06-17 | End: 2019-07-30

## 2019-06-17 RX ORDER — AMLODIPINE BESYLATE 10 MG/1
TABLET ORAL
Qty: 90 TABLET | Refills: 0 | Status: SHIPPED | OUTPATIENT
Start: 2019-06-17 | End: 2019-07-30 | Stop reason: SDUPTHER

## 2019-06-19 ENCOUNTER — TELEPHONE (OUTPATIENT)
Dept: NEPHROLOGY | Facility: CLINIC | Age: 59
End: 2019-06-19

## 2019-07-30 ENCOUNTER — LAB VISIT (OUTPATIENT)
Dept: LAB | Facility: HOSPITAL | Age: 59
End: 2019-07-30
Attending: INTERNAL MEDICINE
Payer: COMMERCIAL

## 2019-07-30 ENCOUNTER — PATIENT MESSAGE (OUTPATIENT)
Dept: INTERNAL MEDICINE | Facility: CLINIC | Age: 59
End: 2019-07-30

## 2019-07-30 ENCOUNTER — OFFICE VISIT (OUTPATIENT)
Dept: INTERNAL MEDICINE | Facility: CLINIC | Age: 59
End: 2019-07-30
Payer: COMMERCIAL

## 2019-07-30 VITALS
WEIGHT: 138.88 LBS | SYSTOLIC BLOOD PRESSURE: 120 MMHG | HEIGHT: 66 IN | DIASTOLIC BLOOD PRESSURE: 80 MMHG | BODY MASS INDEX: 22.32 KG/M2

## 2019-07-30 DIAGNOSIS — Z00.00 ANNUAL PHYSICAL EXAM: ICD-10-CM

## 2019-07-30 DIAGNOSIS — Z12.31 SCREENING MAMMOGRAM, ENCOUNTER FOR: ICD-10-CM

## 2019-07-30 DIAGNOSIS — E21.3 HYPERPARATHYROIDISM: ICD-10-CM

## 2019-07-30 DIAGNOSIS — E07.9 THYROID DISEASE: ICD-10-CM

## 2019-07-30 DIAGNOSIS — E78.2 MIXED HYPERLIPIDEMIA: ICD-10-CM

## 2019-07-30 DIAGNOSIS — N28.1 ACQUIRED CYST OF KIDNEY: ICD-10-CM

## 2019-07-30 DIAGNOSIS — E04.1 THYROID NODULE: ICD-10-CM

## 2019-07-30 DIAGNOSIS — I10 ESSENTIAL HYPERTENSION: ICD-10-CM

## 2019-07-30 DIAGNOSIS — Z00.00 ANNUAL PHYSICAL EXAM: Primary | ICD-10-CM

## 2019-07-30 LAB
25(OH)D3+25(OH)D2 SERPL-MCNC: 32 NG/ML (ref 30–96)
ALBUMIN SERPL BCP-MCNC: 4.8 G/DL (ref 3.5–5.2)
ALP SERPL-CCNC: 115 U/L (ref 55–135)
ALT SERPL W/O P-5'-P-CCNC: 34 U/L (ref 10–44)
ANION GAP SERPL CALC-SCNC: 9 MMOL/L (ref 8–16)
ANION GAP SERPL CALC-SCNC: 9 MMOL/L (ref 8–16)
AST SERPL-CCNC: 23 U/L (ref 10–40)
BASOPHILS # BLD AUTO: 0.03 K/UL (ref 0–0.2)
BASOPHILS NFR BLD: 0.7 % (ref 0–1.9)
BILIRUB SERPL-MCNC: 0.4 MG/DL (ref 0.1–1)
BUN SERPL-MCNC: 11 MG/DL (ref 6–20)
BUN SERPL-MCNC: 11 MG/DL (ref 6–20)
CALCIUM SERPL-MCNC: 11.1 MG/DL (ref 8.7–10.5)
CALCIUM SERPL-MCNC: 11.1 MG/DL (ref 8.7–10.5)
CHLORIDE SERPL-SCNC: 108 MMOL/L (ref 95–110)
CHLORIDE SERPL-SCNC: 108 MMOL/L (ref 95–110)
CHOLEST SERPL-MCNC: 150 MG/DL (ref 120–199)
CHOLEST/HDLC SERPL: 2.4 {RATIO} (ref 2–5)
CO2 SERPL-SCNC: 26 MMOL/L (ref 23–29)
CO2 SERPL-SCNC: 26 MMOL/L (ref 23–29)
CREAT SERPL-MCNC: 0.8 MG/DL (ref 0.5–1.4)
CREAT SERPL-MCNC: 0.8 MG/DL (ref 0.5–1.4)
DIFFERENTIAL METHOD: ABNORMAL
EOSINOPHIL # BLD AUTO: 0.1 K/UL (ref 0–0.5)
EOSINOPHIL NFR BLD: 3.1 % (ref 0–8)
ERYTHROCYTE [DISTWIDTH] IN BLOOD BY AUTOMATED COUNT: 14.9 % (ref 11.5–14.5)
EST. GFR  (AFRICAN AMERICAN): >60 ML/MIN/1.73 M^2
EST. GFR  (AFRICAN AMERICAN): >60 ML/MIN/1.73 M^2
EST. GFR  (NON AFRICAN AMERICAN): >60 ML/MIN/1.73 M^2
EST. GFR  (NON AFRICAN AMERICAN): >60 ML/MIN/1.73 M^2
GLUCOSE SERPL-MCNC: 101 MG/DL (ref 70–110)
GLUCOSE SERPL-MCNC: 101 MG/DL (ref 70–110)
HCT VFR BLD AUTO: 50.5 % (ref 37–48.5)
HDLC SERPL-MCNC: 63 MG/DL (ref 40–75)
HDLC SERPL: 42 % (ref 20–50)
HGB BLD-MCNC: 16.1 G/DL (ref 12–16)
LDLC SERPL CALC-MCNC: 64 MG/DL (ref 63–159)
LYMPHOCYTES # BLD AUTO: 1.1 K/UL (ref 1–4.8)
LYMPHOCYTES NFR BLD: 26.4 % (ref 18–48)
MCH RBC QN AUTO: 29.3 PG (ref 27–31)
MCHC RBC AUTO-ENTMCNC: 31.9 G/DL (ref 32–36)
MCV RBC AUTO: 92 FL (ref 82–98)
MONOCYTES # BLD AUTO: 0.4 K/UL (ref 0.3–1)
MONOCYTES NFR BLD: 9.7 % (ref 4–15)
NEUTROPHILS # BLD AUTO: 2.5 K/UL (ref 1.8–7.7)
NEUTROPHILS NFR BLD: 60.1 % (ref 38–73)
NONHDLC SERPL-MCNC: 87 MG/DL
PLATELET # BLD AUTO: 215 K/UL (ref 150–350)
PMV BLD AUTO: 10 FL (ref 9.2–12.9)
POTASSIUM SERPL-SCNC: 4.4 MMOL/L (ref 3.5–5.1)
POTASSIUM SERPL-SCNC: 4.4 MMOL/L (ref 3.5–5.1)
PROT SERPL-MCNC: 7.9 G/DL (ref 6–8.4)
PTH-INTACT SERPL-MCNC: 95 PG/ML (ref 9–77)
RBC # BLD AUTO: 5.49 M/UL (ref 4–5.4)
SODIUM SERPL-SCNC: 143 MMOL/L (ref 136–145)
SODIUM SERPL-SCNC: 143 MMOL/L (ref 136–145)
TRIGL SERPL-MCNC: 115 MG/DL (ref 30–150)
TSH SERPL DL<=0.005 MIU/L-ACNC: 0.91 UIU/ML (ref 0.4–4)
WBC # BLD AUTO: 4.13 K/UL (ref 3.9–12.7)

## 2019-07-30 PROCEDURE — 85025 COMPLETE CBC W/AUTO DIFF WBC: CPT

## 2019-07-30 PROCEDURE — 80053 COMPREHEN METABOLIC PANEL: CPT

## 2019-07-30 PROCEDURE — 3074F SYST BP LT 130 MM HG: CPT | Mod: CPTII,S$GLB,, | Performed by: INTERNAL MEDICINE

## 2019-07-30 PROCEDURE — 83970 ASSAY OF PARATHORMONE: CPT

## 2019-07-30 PROCEDURE — 36415 COLL VENOUS BLD VENIPUNCTURE: CPT

## 2019-07-30 PROCEDURE — 3079F DIAST BP 80-89 MM HG: CPT | Mod: CPTII,S$GLB,, | Performed by: INTERNAL MEDICINE

## 2019-07-30 PROCEDURE — 82306 VITAMIN D 25 HYDROXY: CPT

## 2019-07-30 PROCEDURE — 99396 PREV VISIT EST AGE 40-64: CPT | Mod: S$GLB,,, | Performed by: INTERNAL MEDICINE

## 2019-07-30 PROCEDURE — 3079F PR MOST RECENT DIASTOLIC BLOOD PRESSURE 80-89 MM HG: ICD-10-PCS | Mod: CPTII,S$GLB,, | Performed by: INTERNAL MEDICINE

## 2019-07-30 PROCEDURE — 3074F PR MOST RECENT SYSTOLIC BLOOD PRESSURE < 130 MM HG: ICD-10-PCS | Mod: CPTII,S$GLB,, | Performed by: INTERNAL MEDICINE

## 2019-07-30 PROCEDURE — 99396 PR PREVENTIVE VISIT,EST,40-64: ICD-10-PCS | Mod: S$GLB,,, | Performed by: INTERNAL MEDICINE

## 2019-07-30 PROCEDURE — 84443 ASSAY THYROID STIM HORMONE: CPT

## 2019-07-30 PROCEDURE — 80061 LIPID PANEL: CPT

## 2019-07-30 PROCEDURE — 99999 PR PBB SHADOW E&M-EST. PATIENT-LVL V: ICD-10-PCS | Mod: PBBFAC,,, | Performed by: INTERNAL MEDICINE

## 2019-07-30 PROCEDURE — 99999 PR PBB SHADOW E&M-EST. PATIENT-LVL V: CPT | Mod: PBBFAC,,, | Performed by: INTERNAL MEDICINE

## 2019-07-30 RX ORDER — LOSARTAN POTASSIUM 50 MG/1
50 TABLET ORAL DAILY
Qty: 90 TABLET | Refills: 3 | Status: SHIPPED | OUTPATIENT
Start: 2019-07-30 | End: 2020-07-24

## 2019-07-30 RX ORDER — BENAZEPRIL HYDROCHLORIDE 40 MG/1
TABLET ORAL
Qty: 90 TABLET | Refills: 3 | Status: CANCELLED | OUTPATIENT
Start: 2019-07-30

## 2019-07-30 RX ORDER — ATORVASTATIN CALCIUM 20 MG/1
TABLET, FILM COATED ORAL
Qty: 90 TABLET | Refills: 3 | Status: SHIPPED | OUTPATIENT
Start: 2019-07-30 | End: 2020-09-08

## 2019-07-30 RX ORDER — AMLODIPINE BESYLATE 10 MG/1
TABLET ORAL
Qty: 90 TABLET | Refills: 3 | Status: SHIPPED | OUTPATIENT
Start: 2019-07-30 | End: 2020-09-08

## 2019-07-30 NOTE — PATIENT INSTRUCTIONS
Losartan tablets  What is this medicine?  LOSARTAN (jonathan TOBIAS tan) is used to treat high blood pressure and to reduce the risk of stroke in certain patients. This drug also slows the progression of kidney disease in patients with diabetes.  How should I use this medicine?  Take this medicine by mouth with a glass of water. Follow the directions on the prescription label. This medicine can be taken with or without food. Take your doses at regular intervals. Do not take your medicine more often than directed.  Talk to your pediatrician regarding the use of this medicine in children. Special care may be needed.  What side effects may I notice from receiving this medicine?  Side effects that you should report to your doctor or health care professional as soon as possible:  · confusion, dizziness, light headedness or fainting spells  · decreased amount of urine passed  · difficulty breathing or swallowing, hoarseness, or tightening of the throat  · fast or irregular heart beat, palpitations, or chest pain  · skin rash, itching  · swelling of your face, lips, tongue, hands, or feet  Side effects that usually do not require medical attention (report to your doctor or health care professional if they continue or are bothersome):  · cough  · decreased sexual function or desire  · headache  · nasal congestion or stuffiness  · nausea or stomach pain  · sore or cramping muscles  What may interact with this medicine?  · blood pressure medicines  · diuretics, especially triamterene, spironolactone, or amiloride  · fluconazole  · NSAIDs, medicines for pain and inflammation, like ibuprofen or naproxen  · potassium salts or potassium supplements  · rifampin  What if I miss a dose?  If you miss a dose, take it as soon as you can. If it is almost time for your next dose, take only that dose. Do not take double or extra doses.  Where should I keep my medicine?  Keep out of the reach of children.  Store at room temperature between 15  and 30 degrees C (59 and 86 degrees F). Protect from light. Keep container tightly closed. Throw away any unused medicine after the expiration date.  What should I tell my health care provider before I take this medicine?  They need to know if you have any of these conditions:  · heart failure  · kidney or liver disease  · an unusual or allergic reaction to losartan, other medicines, foods, dyes, or preservatives  · pregnant or trying to get pregnant  · breast-feeding  What should I watch for while using this medicine?  Visit your doctor or health care professional for regular checks on your progress. Check your blood pressure as directed. Ask your doctor or health care professional what your blood pressure should be and when you should contact him or her. Call your doctor or health care professional if you notice an irregular or fast heart beat.  Women should inform their doctor if they wish to become pregnant or think they might be pregnant. There is a potential for serious side effects to an unborn child, particularly in the second or third trimester. Talk to your health care professional or pharmacist for more information.  You may get drowsy or dizzy. Do not drive, use machinery, or do anything that needs mental alertness until you know how this drug affects you. Do not stand or sit up quickly, especially if you are an older patient. This reduces the risk of dizzy or fainting spells. Alcohol can make you more drowsy and dizzy. Avoid alcoholic drinks.  Avoid salt substitutes unless you are told otherwise by your doctor or health care professional.  Do not treat yourself for coughs, colds, or pain while you are taking this medicine without asking your doctor or health care professional for advice. Some ingredients may increase your blood pressure.  NOTE:This sheet is a summary. It may not cover all possible information. If you have questions about this medicine, talk to your doctor, pharmacist, or health care  provider. Copyright© 2017 [employer]        Losartan tablets  What is this medicine?  LOSARTAN (jonathan TOBIAS tan) is used to treat high blood pressure and to reduce the risk of stroke in certain patients. This drug also slows the progression of kidney disease in patients with diabetes.  How should I use this medicine?  Take this medicine by mouth with a glass of water. Follow the directions on the prescription label. This medicine can be taken with or without food. Take your doses at regular intervals. Do not take your medicine more often than directed.  Talk to your pediatrician regarding the use of this medicine in children. Special care may be needed.  What side effects may I notice from receiving this medicine?  Side effects that you should report to your doctor or health care professional as soon as possible:  · confusion, dizziness, light headedness or fainting spells  · decreased amount of urine passed  · difficulty breathing or swallowing, hoarseness, or tightening of the throat  · fast or irregular heart beat, palpitations, or chest pain  · skin rash, itching  · swelling of your face, lips, tongue, hands, or feet  Side effects that usually do not require medical attention (report to your doctor or health care professional if they continue or are bothersome):  · cough  · decreased sexual function or desire  · headache  · nasal congestion or stuffiness  · nausea or stomach pain  · sore or cramping muscles  What may interact with this medicine?  · blood pressure medicines  · diuretics, especially triamterene, spironolactone, or amiloride  · fluconazole  · NSAIDs, medicines for pain and inflammation, like ibuprofen or naproxen  · potassium salts or potassium supplements  · rifampin  What if I miss a dose?  If you miss a dose, take it as soon as you can. If it is almost time for your next dose, take only that dose. Do not take double or extra doses.  Where should I keep my medicine?  Keep out of the reach of  children.  Store at room temperature between 15 and 30 degrees C (59 and 86 degrees F). Protect from light. Keep container tightly closed. Throw away any unused medicine after the expiration date.  What should I tell my health care provider before I take this medicine?  They need to know if you have any of these conditions:  · heart failure  · kidney or liver disease  · an unusual or allergic reaction to losartan, other medicines, foods, dyes, or preservatives  · pregnant or trying to get pregnant  · breast-feeding  What should I watch for while using this medicine?  Visit your doctor or health care professional for regular checks on your progress. Check your blood pressure as directed. Ask your doctor or health care professional what your blood pressure should be and when you should contact him or her. Call your doctor or health care professional if you notice an irregular or fast heart beat.  Women should inform their doctor if they wish to become pregnant or think they might be pregnant. There is a potential for serious side effects to an unborn child, particularly in the second or third trimester. Talk to your health care professional or pharmacist for more information.  You may get drowsy or dizzy. Do not drive, use machinery, or do anything that needs mental alertness until you know how this drug affects you. Do not stand or sit up quickly, especially if you are an older patient. This reduces the risk of dizzy or fainting spells. Alcohol can make you more drowsy and dizzy. Avoid alcoholic drinks.  Avoid salt substitutes unless you are told otherwise by your doctor or health care professional.  Do not treat yourself for coughs, colds, or pain while you are taking this medicine without asking your doctor or health care professional for advice. Some ingredients may increase your blood pressure.  NOTE:This sheet is a summary. It may not cover all possible information. If you have questions about this medicine,  talk to your doctor, pharmacist, or health care provider. Copyright© 2017 [employer]

## 2019-07-30 NOTE — PROGRESS NOTES
"Subjective:       Patient ID: Latia Posada is a 59 y.o. female.    Chief Complaint: Annual Exam    Annual exam    BP doing well    She is on benazepril, this will be discontinued and changed over to ARB.    Labs and urine to be done today, she follows closely in Nephrology.    Was being seen in Endocrinology for hyperparathyroidism but her provider has left the clinic.  It was recommended that she see an endocrine surgeon to have a parathyroidectomy that this never transpired:    "Aug 2018 workup:  FeCa 1.9%  Inappropriately normal PTH in setting of hypercalcemia suggestive of primary hyperparathyroidism. Phos is wnl.   US: Parathyroid adenoma versus lymph node located posterior to the right inferior lobe.  Consider 4D parathyroid CT scan to increase specificity.  Parathyroid scan:There are findings suggesting possible adenoma adjacent to the medial, superior pole of the right lobe of the thyroid.  DXA: Osteopenia: FRAX score does not support osteoporosis treatment."    She is currently due for a follow-up thyroid ultrasound because of thyroid nodule.  We discussed the possibility of a 4 D parathyroid CT scan if not prohibitively expensive.  She is willing to return to endocrine and is willing to see Endocrine surgery as well at this time.  Labs will be obtained today.    She is due for mammogram.  She will also schedule GYN currently.    Patient Active Problem List:     Essential hypertension     Menopause syndrome     Thyroid nodule: stable 2014,2017.  Also 8/18 1 year follow up recommended     Mixed hyperlipidemia     Family history of colon cancer: father age 60     Vitamin D deficiency disease     Acquired cyst of kidney: stable on u/s 2017     Calculus of kidney     History of abnormal mammogram     Hypercalcemia     Ganglion cyst of dorsum of left wrist     Ganglion cyst of dorsum of right wrist     Hyperparathyroidism              Review of Systems   Constitutional: Negative for activity change, appetite " change, chills, fatigue and fever.   HENT: Negative for congestion, hearing loss, sinus pressure and sore throat.    Eyes: Negative for visual disturbance.   Respiratory: Negative for apnea, cough, shortness of breath and wheezing.    Cardiovascular: Negative for chest pain, palpitations and leg swelling.   Gastrointestinal: Negative for abdominal distention, abdominal pain, constipation, diarrhea, nausea and vomiting.   Genitourinary: Negative for dysuria, frequency, hematuria and vaginal bleeding.   Musculoskeletal: Negative for gait problem, joint swelling and myalgias.   Skin: Negative for rash.   Neurological: Negative for dizziness, weakness, light-headedness and headaches.   Hematological: Negative for adenopathy. Does not bruise/bleed easily.   Psychiatric/Behavioral: Negative for confusion, hallucinations, sleep disturbance and suicidal ideas.       Objective:      Physical Exam   Constitutional: She is oriented to person, place, and time. She appears well-developed and well-nourished.   HENT:   Head: Normocephalic and atraumatic.   Right Ear: External ear normal.   Left Ear: External ear normal.   Nose: Nose normal.   Mouth/Throat: Oropharynx is clear and moist. No oropharyngeal exudate.   Eyes: Conjunctivae and EOM are normal. No scleral icterus.   Neck: Normal range of motion. Neck supple. No JVD present. Thyromegaly present.   Cardiovascular: Normal rate, regular rhythm, normal heart sounds and intact distal pulses. Exam reveals no gallop.   No murmur heard.  Pulmonary/Chest: Effort normal and breath sounds normal. No respiratory distress. She has no wheezes.   Abdominal: Soft. Bowel sounds are normal. She exhibits no distension and no mass. There is no tenderness. There is no rebound and no guarding.   Musculoskeletal: Normal range of motion. She exhibits no edema or tenderness.   Lymphadenopathy:     She has no cervical adenopathy.   Neurological: She is alert and oriented to person, place, and time.  She displays normal reflexes. No cranial nerve deficit. Coordination normal.   Skin: Skin is warm. No rash noted. No erythema.   Psychiatric: She has a normal mood and affect. Her behavior is normal. Judgment and thought content normal.   Nursing note and vitals reviewed.      Assessment:       1. Annual physical exam    2. Essential hypertension    3. Screening mammogram, encounter for    4. Mixed hyperlipidemia    5. Thyroid nodule: stable 2014, 2017.  Also 8/18 1 year follow up recommended    6. Hyperparathyroidism    7. Acquired cyst of kidney: stable on u/s 2017    8. Thyroid disease        Plan:     Latia was seen today for annual exam.    Diagnoses and all orders for this visit:    Annual physical exam  -     Comprehensive metabolic panel; Future  -     Lipid panel; Future  -     TSH; Future  -     Vitamin D; Future  -     PTH, intact; Future  -     Ambulatory referral to Gynecology    Essential hypertension  -     amLODIPine (NORVASC) 10 MG tablet; TAKE 1 TABLET(10 MG) BY MOUTH EVERY DAY    Screening mammogram, encounter for  -     Mammo Digital Screening Bilat w/ Quirino; Standing    Mixed hyperlipidemia:  Labs and review    Thyroid nodule: stable 2014, 2017.  Also 8/18 1 year follow up recommended  -     Ambulatory referral to Endocrinology  -     US Soft Tissue Head Neck Thyroid; Future    Hyperparathyroidism:  Alarm symptoms reviewed; labs and follow-up to be scheduled  -     Ambulatory referral to Endocrinology  -     Ambulatory consult to Endocrine Surgery    Acquired cyst of kidney: stable on u/s 2017:  Keep Nephrology follow-up.  Monitor periodically as needed    Parathyroid disease  -     CT Neck Parathyroid (4D); Future    Other orders  -     atorvastatin (LIPITOR) 20 MG tablet; TAKE 1 TABLET(20 MG) BY MOUTH EVERY DAY  -     losartan (COZAAR) 50 MG tablet; Take 1 tablet (50 mg total) by mouth once daily.    Continue with exercise and healthy habits  Shingles vaccine when available  Low salt diet,  exercise.  Call if BP > 130/80 on a regular basis.  I will review all studies and determine further tx depending on findings

## 2019-07-31 ENCOUNTER — TELEPHONE (OUTPATIENT)
Dept: SURGERY | Facility: CLINIC | Age: 59
End: 2019-07-31

## 2019-07-31 NOTE — TELEPHONE ENCOUNTER
Spoke with patient after obtaining clarification from Dr. Bonilla on whom the pt should see first. She's going to see Dr. Patiño.

## 2019-08-05 ENCOUNTER — HOSPITAL ENCOUNTER (OUTPATIENT)
Dept: RADIOLOGY | Facility: HOSPITAL | Age: 59
Discharge: HOME | End: 2019-08-05
Attending: INTERNAL MEDICINE
Payer: COMMERCIAL

## 2019-08-05 DIAGNOSIS — E04.1 THYROID NODULE: ICD-10-CM

## 2019-08-05 DIAGNOSIS — E07.9 THYROID DISEASE: ICD-10-CM

## 2019-08-05 PROCEDURE — 70492 CT NECK PARATHYROID (4D): ICD-10-PCS | Mod: 26,,, | Performed by: RADIOLOGY

## 2019-08-05 PROCEDURE — 76536 US EXAM OF HEAD AND NECK: CPT | Mod: TC

## 2019-08-05 PROCEDURE — 76536 US SOFT TISSUE HEAD NECK THYROID: ICD-10-PCS | Mod: 26,,, | Performed by: RADIOLOGY

## 2019-08-05 PROCEDURE — 25500020 PHARM REV CODE 255: Performed by: INTERNAL MEDICINE

## 2019-08-05 PROCEDURE — 70492 CT SFT TSUE NCK W/O & W/DYE: CPT | Mod: 26,,, | Performed by: RADIOLOGY

## 2019-08-05 PROCEDURE — 70492 CT SFT TSUE NCK W/O & W/DYE: CPT | Mod: TC

## 2019-08-05 PROCEDURE — 76536 US EXAM OF HEAD AND NECK: CPT | Mod: 26,,, | Performed by: RADIOLOGY

## 2019-08-05 RX ADMIN — IOHEXOL 100 ML: 350 INJECTION, SOLUTION INTRAVENOUS at 06:08

## 2019-08-06 ENCOUNTER — PATIENT MESSAGE (OUTPATIENT)
Dept: INTERNAL MEDICINE | Facility: CLINIC | Age: 59
End: 2019-08-06

## 2019-08-06 ENCOUNTER — TELEPHONE (OUTPATIENT)
Dept: INTERNAL MEDICINE | Facility: CLINIC | Age: 59
End: 2019-08-06

## 2019-08-06 DIAGNOSIS — I65.29 CAROTID ATHEROSCLEROSIS, UNSPECIFIED LATERALITY: Primary | ICD-10-CM

## 2019-08-09 ENCOUNTER — OFFICE VISIT (OUTPATIENT)
Dept: NEPHROLOGY | Facility: CLINIC | Age: 59
End: 2019-08-09
Payer: COMMERCIAL

## 2019-08-09 VITALS
SYSTOLIC BLOOD PRESSURE: 120 MMHG | DIASTOLIC BLOOD PRESSURE: 84 MMHG | HEIGHT: 66 IN | WEIGHT: 136 LBS | HEART RATE: 87 BPM | OXYGEN SATURATION: 98 % | BODY MASS INDEX: 21.86 KG/M2

## 2019-08-09 DIAGNOSIS — N20.0 CALCULUS OF KIDNEY: ICD-10-CM

## 2019-08-09 DIAGNOSIS — E21.3 HYPERPARATHYROIDISM: ICD-10-CM

## 2019-08-09 DIAGNOSIS — I10 ESSENTIAL HYPERTENSION: Primary | ICD-10-CM

## 2019-08-09 DIAGNOSIS — N28.1 ACQUIRED CYST OF KIDNEY: ICD-10-CM

## 2019-08-09 DIAGNOSIS — E83.52 HYPERCALCEMIA: ICD-10-CM

## 2019-08-09 PROCEDURE — 99999 PR PBB SHADOW E&M-EST. PATIENT-LVL III: CPT | Mod: PBBFAC,,, | Performed by: INTERNAL MEDICINE

## 2019-08-09 PROCEDURE — 3008F BODY MASS INDEX DOCD: CPT | Mod: CPTII,S$GLB,, | Performed by: INTERNAL MEDICINE

## 2019-08-09 PROCEDURE — 3074F SYST BP LT 130 MM HG: CPT | Mod: CPTII,S$GLB,, | Performed by: INTERNAL MEDICINE

## 2019-08-09 PROCEDURE — 99999 PR PBB SHADOW E&M-EST. PATIENT-LVL III: ICD-10-PCS | Mod: PBBFAC,,, | Performed by: INTERNAL MEDICINE

## 2019-08-09 PROCEDURE — 3079F PR MOST RECENT DIASTOLIC BLOOD PRESSURE 80-89 MM HG: ICD-10-PCS | Mod: CPTII,S$GLB,, | Performed by: INTERNAL MEDICINE

## 2019-08-09 PROCEDURE — 3074F PR MOST RECENT SYSTOLIC BLOOD PRESSURE < 130 MM HG: ICD-10-PCS | Mod: CPTII,S$GLB,, | Performed by: INTERNAL MEDICINE

## 2019-08-09 PROCEDURE — 3008F PR BODY MASS INDEX (BMI) DOCUMENTED: ICD-10-PCS | Mod: CPTII,S$GLB,, | Performed by: INTERNAL MEDICINE

## 2019-08-09 PROCEDURE — 99213 PR OFFICE/OUTPT VISIT, EST, LEVL III, 20-29 MIN: ICD-10-PCS | Mod: S$GLB,,, | Performed by: INTERNAL MEDICINE

## 2019-08-09 PROCEDURE — 99213 OFFICE O/P EST LOW 20 MIN: CPT | Mod: S$GLB,,, | Performed by: INTERNAL MEDICINE

## 2019-08-09 PROCEDURE — 3079F DIAST BP 80-89 MM HG: CPT | Mod: CPTII,S$GLB,, | Performed by: INTERNAL MEDICINE

## 2019-08-09 NOTE — PROGRESS NOTES
NEPHROLOGY PROGRESS NOTE    INTERVAL HISTORY    Subjective:       Patient ID: Latia Posada is a 59 y.o. Black or  female who presents for follow-up evaluation of hypertension and nephrolithiasis.    HPI  She has no urinary symptoms and is in her usual state of health.  There have been no recent illnesses, hospitalizations or procedures. Last renal colic documented several years ago.  She does not take NSAIDs. She is being worked up by endocrine surgery for primary hyperparathyroidism    Review of Systems   Constitutional: Negative for appetite change, chills and fever.   Eyes: Negative for visual disturbance.   Respiratory: Negative for cough and shortness of breath.    Cardiovascular: Negative for chest pain and leg swelling.   Gastrointestinal: Negative for diarrhea, nausea and vomiting.   Genitourinary: Negative for difficulty urinating, dysuria and hematuria.   Skin: Negative for rash.       Objective:    /84 mmHg  Physical Exam   Constitutional: She appears well-developed and well-nourished. No distress.   HENT:   Head: Normocephalic and atraumatic.   Eyes: Conjunctivae are normal. No scleral icterus.   Neck: Normal range of motion. No JVD present.   Cardiovascular: Normal rate, regular rhythm and normal heart sounds. Exam reveals no gallop and no friction rub.   No murmur heard.  Pulmonary/Chest: Effort normal and breath sounds normal. No respiratory distress. She has no wheezes.   Abdominal: Soft. Bowel sounds are normal. She exhibits no distension. There is no tenderness.   Musculoskeletal: She exhibits no edema.   Skin: Skin is warm and dry. No rash noted.   Psychiatric: She has a normal mood and affect.   Vitals reviewed.      Serum Cr 0.8 mg/dL    Assessment:       1. Essential hypertension. Relatively well controlled. Will continue benazepril and amlodipine. Will repeat labs today.    2. Acquired cyst of kidney. Unchanged   3. Calculus of kidney. Last renal US shows less stones.  Reminded to maintain high water intake and low salt intake.        Plan:   Return to clinic in 1 year with KAROLYN

## 2019-08-10 ENCOUNTER — HOSPITAL ENCOUNTER (OUTPATIENT)
Dept: RADIOLOGY | Facility: OTHER | Age: 59
Discharge: HOME | End: 2019-08-10
Attending: INTERNAL MEDICINE
Payer: COMMERCIAL

## 2019-08-10 DIAGNOSIS — I65.29 CAROTID ATHEROSCLEROSIS, UNSPECIFIED LATERALITY: ICD-10-CM

## 2019-08-10 PROCEDURE — 93880 US CAROTID BILATERAL: ICD-10-PCS | Mod: 26,,, | Performed by: RADIOLOGY

## 2019-08-10 PROCEDURE — 93880 EXTRACRANIAL BILAT STUDY: CPT | Mod: 26,,, | Performed by: RADIOLOGY

## 2019-08-10 PROCEDURE — 93880 EXTRACRANIAL BILAT STUDY: CPT | Mod: TC

## 2019-08-12 ENCOUNTER — PATIENT MESSAGE (OUTPATIENT)
Dept: INTERNAL MEDICINE | Facility: CLINIC | Age: 59
End: 2019-08-12

## 2019-08-16 ENCOUNTER — PATIENT OUTREACH (OUTPATIENT)
Dept: ADMINISTRATIVE | Facility: OTHER | Age: 59
End: 2019-08-16

## 2019-08-19 ENCOUNTER — TELEPHONE (OUTPATIENT)
Dept: SURGERY | Facility: CLINIC | Age: 59
End: 2019-08-19

## 2019-08-19 ENCOUNTER — OFFICE VISIT (OUTPATIENT)
Dept: ENDOCRINOLOGY | Facility: CLINIC | Age: 59
End: 2019-08-19
Attending: INTERNAL MEDICINE
Payer: COMMERCIAL

## 2019-08-19 VITALS
WEIGHT: 144.81 LBS | BODY MASS INDEX: 23.27 KG/M2 | HEART RATE: 92 BPM | DIASTOLIC BLOOD PRESSURE: 79 MMHG | HEIGHT: 66 IN | SYSTOLIC BLOOD PRESSURE: 118 MMHG

## 2019-08-19 DIAGNOSIS — I10 ESSENTIAL HYPERTENSION: ICD-10-CM

## 2019-08-19 DIAGNOSIS — E21.0 PRIMARY HYPERPARATHYROIDISM: Primary | ICD-10-CM

## 2019-08-19 DIAGNOSIS — E04.1 THYROID NODULE: ICD-10-CM

## 2019-08-19 PROBLEM — E83.52 HYPERCALCEMIA: Status: RESOLVED | Noted: 2018-08-07 | Resolved: 2019-08-19

## 2019-08-19 PROCEDURE — 3008F PR BODY MASS INDEX (BMI) DOCUMENTED: ICD-10-PCS | Mod: CPTII,S$GLB,, | Performed by: INTERNAL MEDICINE

## 2019-08-19 PROCEDURE — 3078F DIAST BP <80 MM HG: CPT | Mod: CPTII,S$GLB,, | Performed by: INTERNAL MEDICINE

## 2019-08-19 PROCEDURE — 3074F PR MOST RECENT SYSTOLIC BLOOD PRESSURE < 130 MM HG: ICD-10-PCS | Mod: CPTII,S$GLB,, | Performed by: INTERNAL MEDICINE

## 2019-08-19 PROCEDURE — 3008F BODY MASS INDEX DOCD: CPT | Mod: CPTII,S$GLB,, | Performed by: INTERNAL MEDICINE

## 2019-08-19 PROCEDURE — 3074F SYST BP LT 130 MM HG: CPT | Mod: CPTII,S$GLB,, | Performed by: INTERNAL MEDICINE

## 2019-08-19 PROCEDURE — 99214 OFFICE O/P EST MOD 30 MIN: CPT | Mod: S$GLB,,, | Performed by: INTERNAL MEDICINE

## 2019-08-19 PROCEDURE — 99214 PR OFFICE/OUTPT VISIT, EST, LEVL IV, 30-39 MIN: ICD-10-PCS | Mod: S$GLB,,, | Performed by: INTERNAL MEDICINE

## 2019-08-19 PROCEDURE — 3078F PR MOST RECENT DIASTOLIC BLOOD PRESSURE < 80 MM HG: ICD-10-PCS | Mod: CPTII,S$GLB,, | Performed by: INTERNAL MEDICINE

## 2019-08-19 NOTE — ASSESSMENT & PLAN NOTE
--Has multiple small thyroid nodules that do not meet FNA criteria  --No risk factors for thyroid cancer  --Repeat ultrasound in one year

## 2019-08-19 NOTE — ASSESSMENT & PLAN NOTE
--Patient with clinically confirmed primary HPT  --Reviewed surgical indications with her and she has two: degree of hypercalcemia and nephrolithiasis  --She localized on ultrasound, sestimibi and 4D CT to the right side  --I did recommend surgery and will refer to Dr. Bonilla

## 2019-08-19 NOTE — PROGRESS NOTES
Subjective:      Patient ID: Sara Sloan is a 59 y.o. female.    Chief Complaint:  Hyperparathyroidism    Referral from Dr. Joya    History of Present Illness  Ms. Sloan has a history of kidney stones, thyroid nodules, HL,  here for follow up of primary HPT. Last seen by Dr. Anderson in 8/2018.     Referred by PCP after found to have high PTH and high calcium (below in Aug 2016 - PTH was 86 for calcium of 10.6, vitD 35 and GFR>60) - prior to 2018 had high-normal calciums but no overt hypercalcemia. No family hx of OP or calcium disorders.  Multiple episodes of kidney stones in the past that she passed spotaneously.  Takes vitamin D 1000 units daily  No Hx of thiazide of lithium use.   Broke toe but no other fractures or fragility fractures    CT parathyroid protocol 8/5/2019:  Impression       Multiple stable bilateral thyroid nodules.  None of which meet criteria for further follow-up.    Parathyroid adenoma posterior to the right inferior lobe, similar to prior.       Regarding thyroid nodules: TSH normal. No family history of thyroid cancer.   8/5/2019:  Impression       Multiple stable bilateral thyroid nodules.  None of which meet criteria for further follow-up.    Parathyroid adenoma posterior to the right inferior lobe, similar to prior.     Results for SARA SLOAN (MRN [id number]) as of 8/19/2019 07:35   Ref. Range 8/29/2018 09:04   Calcium, Urine Latest Ref Range: 0.0 - 15.0 mg/dL 15.6 (H)   Calcium, 24H Urine Latest Ref Range: 4 - 12 mg/Hr 9   CA Urine (mg/Spec) Latest Units: mg/Spec 222   Creatinine, Ur (mg/spec) Latest Units: mg/Spec 855.0     Bone Density 8/2018:  FINDINGS:  Lumbar Spine: Lumbar bone mineral density L1-L4 is 0.801g/cm2, which is a T-score of -2.2. The Z-score is -1.8.    Total Hip: Total hip bone mineral density is 0.753g/cm2.  The T-score is -1.5, and the Z-score is -1.1.    Femoral neck: Bone mineral density is 0.588g/cm2 and the T-score is -2.3 and the Z-score is -1.6  g/cm2.    Forearm: The left forearm (radius 33%) bone mineral density is equal to 0.609 g/cm squared with at T-Score of -1.4.  There is -0.3    There is a 7.5% risk of a major osteoporotic fracture and a 0.6% risk of hip fracture in the next 10 years (FRAX).    Compared with previous DXA, BMD at the lumbar spine has remained stable, and the BMD at the total hip has remained stable.      Impression       Osteopenia: FRAX score does not support osteoporosis treatment     Review of Systems   Constitutional: Negative for chills and fever.   Gastrointestinal: Negative for nausea.       Objective:   Physical Exam   Nursing note and vitals reviewed.  thyroid upper limits of normal in size   No tremor  DTR's 2 +  Heart RRR  Lungs CTA b/l    BP Readings from Last 3 Encounters:   08/19/19 118/79   08/09/19 120/84   07/30/19 120/80     Wt Readings from Last 1 Encounters:   08/19/19 1140 65.7 kg (144 lb 13.5 oz)       Body mass index is 23.38 kg/m².    Lab Review:   No results found for: HGBA1C  Lab Results   Component Value Date    CHOL 150 07/30/2019    HDL 63 07/30/2019    LDLCALC 64.0 07/30/2019    TRIG 115 07/30/2019    CHOLHDL 42.0 07/30/2019     Lab Results   Component Value Date     07/30/2019     07/30/2019    K 4.4 07/30/2019    K 4.4 07/30/2019     07/30/2019     07/30/2019    CO2 26 07/30/2019    CO2 26 07/30/2019     07/30/2019     07/30/2019    BUN 11 07/30/2019    BUN 11 07/30/2019    CREATININE 0.8 07/30/2019    CREATININE 0.8 07/30/2019    CALCIUM 11.1 (H) 07/30/2019    CALCIUM 11.1 (H) 07/30/2019    PROT 7.9 07/30/2019    ALBUMIN 4.8 07/30/2019    BILITOT 0.4 07/30/2019    ALKPHOS 115 07/30/2019    AST 23 07/30/2019    ALT 34 07/30/2019    ANIONGAP 9 07/30/2019    ANIONGAP 9 07/30/2019    ESTGFRAFRICA >60 07/30/2019    ESTGFRAFRICA >60 07/30/2019    EGFRNONAA >60 07/30/2019    EGFRNONAA >60 07/30/2019    TSH 0.914 07/30/2019         Assessment and Plan     Primary  hyperparathyroidism  --Patient with clinically confirmed primary HPT  --Reviewed surgical indications with her and she has two: degree of hypercalcemia and nephrolithiasis  --She localized on ultrasound, sestimibi and 4D CT to the right side  --I did recommend surgery and will refer to Dr. Bonilla    Thyroid nodule: stable 2014,2017.  Also 8/18 1 year follow up recommended  --Has multiple small thyroid nodules that do not meet FNA criteria  --No risk factors for thyroid cancer  --Repeat ultrasound in one year    Essential hypertension  --bp stable on current regimen    Jamey Patiño M.D. Staff Endocrinology

## 2019-08-19 NOTE — LETTER
August 19, 2019      Lizzy Joya MD  [address]  [facility] [address]           [facility] Endocrin [facility] [address] [facility] [address]  [address] [place] [address], [address]  [facility] [address]  Phone: [phone]  Fax: [phone]          Patient: Latia Posada   MR Number: [id number]   YOB: 1960   Date of Visit: 8/19/2019       Dear Dr. Lizzy Joya:    Thank you for referring Latia Posada to me for evaluation. Attached you will find relevant portions of my assessment and plan of care.    If you have questions, please do not hesitate to call me. I look forward to following Latia Posada along with you.    Sincerely,    Jamey Patiño MD    Enclosure  CC:  No Recipients    If you would like to receive this communication electronically, please contact [email or web address] or [phone] to request more information on [employer] access.    For providers and/or their staff who would like to refer a patient to [facility], please contact us through our one-stop-shop provider referral line, [facility], at [phone].    If you feel you have received this communication in error or would no longer like to receive these types of communications, please e-mail [email or web address]

## 2019-08-29 ENCOUNTER — INITIAL CONSULT (OUTPATIENT)
Dept: SURGERY | Facility: CLINIC | Age: 59
End: 2019-08-29
Payer: COMMERCIAL

## 2019-08-29 VITALS
TEMPERATURE: 99 F | HEART RATE: 91 BPM | RESPIRATION RATE: 18 BRPM | DIASTOLIC BLOOD PRESSURE: 80 MMHG | SYSTOLIC BLOOD PRESSURE: 134 MMHG | HEIGHT: 66 IN | WEIGHT: 139.56 LBS | BODY MASS INDEX: 22.43 KG/M2

## 2019-08-29 DIAGNOSIS — E21.0 HYPERPARATHYROIDISM, PRIMARY: Primary | ICD-10-CM

## 2019-08-29 PROCEDURE — 99999 PR PBB SHADOW E&M-EST. PATIENT-LVL V: ICD-10-PCS | Mod: PBBFAC,,, | Performed by: SURGERY

## 2019-08-29 PROCEDURE — 3075F SYST BP GE 130 - 139MM HG: CPT | Mod: CPTII,S$GLB,, | Performed by: SURGERY

## 2019-08-29 PROCEDURE — 3079F PR MOST RECENT DIASTOLIC BLOOD PRESSURE 80-89 MM HG: ICD-10-PCS | Mod: CPTII,S$GLB,, | Performed by: SURGERY

## 2019-08-29 PROCEDURE — 99204 OFFICE O/P NEW MOD 45 MIN: CPT | Mod: S$GLB,,, | Performed by: SURGERY

## 2019-08-29 PROCEDURE — 3075F PR MOST RECENT SYSTOLIC BLOOD PRESS GE 130-139MM HG: ICD-10-PCS | Mod: CPTII,S$GLB,, | Performed by: SURGERY

## 2019-08-29 PROCEDURE — 3008F BODY MASS INDEX DOCD: CPT | Mod: CPTII,S$GLB,, | Performed by: SURGERY

## 2019-08-29 PROCEDURE — 3079F DIAST BP 80-89 MM HG: CPT | Mod: CPTII,S$GLB,, | Performed by: SURGERY

## 2019-08-29 PROCEDURE — 99999 PR PBB SHADOW E&M-EST. PATIENT-LVL V: CPT | Mod: PBBFAC,,, | Performed by: SURGERY

## 2019-08-29 PROCEDURE — 99204 PR OFFICE/OUTPT VISIT, NEW, LEVL IV, 45-59 MIN: ICD-10-PCS | Mod: S$GLB,,, | Performed by: SURGERY

## 2019-08-29 PROCEDURE — 3008F PR BODY MASS INDEX (BMI) DOCUMENTED: ICD-10-PCS | Mod: CPTII,S$GLB,, | Performed by: SURGERY

## 2019-08-29 NOTE — Clinical Note
Orders for surgery and labs/tests placed.  Needs PCP clearance.  No need to cc me on request.Thanks Whitney Julian is here for a return obstetrical visit. Today she is 36w0d weeks EGA. She is doing well and has no complaints. She  does not have vaginal bleeding, leaking of fluid, contractions. She does not have blurred vision, SOB, or increased swelling in legs or face. Pt does feel fetal movement regularly. GBS collected. Pt feels like she is having some leaking. No pooling noted in speculum, bag of water intake on internal exam. Discussed labor precautions with pt understanding. Objective: Mother's Prenatal Vitals  BP: 128/77  Weight: 186 lb (84.4 kg)  Pulse: 101  Patient Position: Sitting  Alb/Glu  Albumin: Negative  Glucose: Negative  Prenatal Fetal Information  Fundal Height (cm): 36 cm  Fetal Heart Rate: 155  Movement: Present  Cervical Exam  Dilation (cm): 2  Effacement (%): 70  Station: -2  Station (Labor Curve Graph): 7  Presentation: Vertex  Dil/Eff/Sta  Dilation (cm): 2  Effacement (%): 70  Station: -2  Pt is A&Ox3, in no acute distress. Normocephalic, atraumatic. PERRL. Resp even and non-labored. Skin pink, warm & dry. Gravid abdomen. CASILLAS's well. Gait steady. Assessment:  IUP at 36w0d wks      Diagnosis Orders   1. Encounter for supervision of other normal pregnancy in third trimester     2. 36 weeks gestation of pregnancy       Plan:Pt counseled on labor precautions and Kick count  Continue with routine prenatal care. RTC in 1 wk for prenatal visit    MEDICATIONS:  No orders of the defined types were placed in this encounter. ORDERS:  No orders of the defined types were placed in this encounter.

## 2019-08-29 NOTE — LETTER
Endocrine/General Surgery  [address] [place] [address]  Phone: [phone]  Fax: [phone] September 3, 2019         Jamey Patiño MD  [address] [facility] [address]    Patient: Latia Posada   YOB: 1960   Date of Visit: 8/29/2019     Dear Dr. Patiño:    I saw Ms. Posada in clinic. Attached you will find a copy of my note.    As you know, she is a 59 y.o. female who presented with hypercalcemia. I was able to discuss the expected chris-operative course as well as indications for surgery. The current plan includes parathyroid surgery.    If you have any questions or concerns, please don't hesitate to contact my office. Again, thank you kindly for this referral.    Regards,      Myesha Chaney MD        Lizzy Joya MD  [address]  [place] [address]  VIA In Basket

## 2019-08-29 NOTE — Clinical Note
September 3, 2019      Jamey Patiño MD  [address] [facility] [address]  [facility] [address]           [facility] 2nd FL  [address]  [facility] [address]  Phone: [phone]          Patient: Latia Posada   MR Number: [id number]   YOB: 1960   Date of Visit: 8/29/2019       Dear Dr. Jamey Patiño:    Thank you for referring Latia Posada to me for evaluation. Attached you will find relevant portions of my assessment and plan of care.    If you have questions, please do not hesitate to call me. I look forward to following Latia Posada along with you.    Sincerely,    Myesha Chaney MD    Enclosure  CC:  No Recipients    If you would like to receive this communication electronically, please contact [email or web address] or [phone] to request more information on [employer] Link access.    For providers and/or their staff who would like to refer a patient to [facility], please contact us through our one-stop-shop provider referral line, [facility], at [phone].    If you feel you have received this communication in error or would no longer like to receive these types of communications, please e-mail [email or web address]

## 2019-08-29 NOTE — PROGRESS NOTES
I have reviewed the Resident's consultation note. I agree with the findings. Any changes were made directly in the note below.    Myesha Chaney MD  Endocrine Surgery & General Surgery      Consult Note  Endocrine Surgery    Visit Diagnosis: Hyperparathyroidism, primary [E21.0]    SUBJECTIVE:     Patient is a 59 y.o. female who was referred by Dr. Jamey Patiño and is here unaccompanied and presents for evaluation of primary hyperparathyroidism. The patient has had complaints of elevation of the serum calcium and parathormone and history of nephrolithiasis. There is no history of lithium or thiazide medication use. She has not had previous history of head or neck radiation. She admits joint pain, abdominal pain, constipation, weakness and lethargy. She denies sleep disturbance, mood changes, increased urination and increased thirst. She has had many episodes of nephrolithiasis in the past, most recently about a year ago. Furthermore, there is no history of pathologic fractures, malignancy, or personal history of thyroid, adrenal, or pancreatic abnormalities. The patient is vitamin D replete. She consumes a few servings of dietary calcium a week; she is not on calcium supplementationShe does have familial history of endocrinopathies: her father had a subtotal parathyroidectomy for hyperparathyroidism secondary to renal failure.      Review of patient's allergies indicates:   Allergen Reactions    Lactose      Cramping, diarrhea       Current Outpatient Medications   Medication Sig Dispense Refill    amLODIPine (NORVASC) 10 MG tablet TAKE 1 TABLET(10 MG) BY MOUTH EVERY DAY 90 tablet 3    atorvastatin (LIPITOR) 20 MG tablet TAKE 1 TABLET(20 MG) BY MOUTH EVERY DAY 90 tablet 3    cholecalciferol, vitamin D3, 1,000 unit capsule Take 2 capsules (2,000 Units total) by mouth once daily. (Patient taking differently: Take 1,000 Units by mouth once daily. ) 60 capsule 12    losartan (COZAAR) 50 MG tablet Take 1  "tablet (50 mg total) by mouth once daily. 90 tablet 3     No current facility-administered medications for this visit.        Past Medical History:   Diagnosis Date    Chronic kidney disease     Family history of colon cancer 12/4/2012    Hypercalcemia 8/7/2018    Hyperlipidemia     Hypertension     Kidney stones 12/4/2012    Menopause syndrome 12/4/2012    Thyroid nodule 12/4/2012    Vitamin D deficiency disease 2/3/2014     Past Surgical History:   Procedure Laterality Date    BREAST CYST EXCISION Right     CHOLECYSTECTOMY      COLONOSCOPY N/A 4/26/2014    Performed by Karson Goodman MD at [facility] ([address] [place] [address])    HYSTERECTOMY       Social History     Tobacco Use    Smoking status: Never Smoker    Smokeless tobacco: Never Used   Substance Use Topics    Alcohol use: Yes    Drug use: No          Review of Systems:    Review of Systems   Constitutional: Positive for fatigue.   HEENT: Negative.    Respiratory: Negative.    Cardiovascular: Negative.    Endocrine: endocrine negative   Musculoskeletal: Positive for muscle weakness.   Skin: Negative.    Gastrointestinal: Positive for constipation.   Hematological: Negative.          OBJECTIVE:     Vital Signs:  /80   Pulse 91   Temp 98.6 °F (37 °C)   Resp 18   Ht 5' 6" (1.676 m)   Wt 63.3 kg (139 lb 8.8 oz)   BMI 22.52 kg/m²    Body mass index is 22.52 kg/m².      Physical Exam    General:  no distress, see vitals for BMI    Eyes:  conjunctivae/corneas clear   Neck: trachea midline and symmetric, no adenopathy    Thyroid:  thyroid enlarged and thyroid no palpable nodule   Lung: clear to auscultation bilaterally   Heart:  regular rate and rhythm   Abdomen: soft, non-tender; bowel sounds normal; no masses,  no organomegaly   Skin/Extremities: warm and well-perfused   Pulses: 2+ and symmetric   Neuro: normal without focal findings and mental status, speech normal, alert and oriented x3       Imaging    Studies:  Date:       Results:     DEXA:   " 8/29/18 Spine T Score: -2.2, Hip T Score: -1.5,   Femoral neck T Score: -2.3   Ultrasound:  8/5/19 There is a parathyroid adenoma measuring 1.3 x 0.9 x 0.8 cm located posterior to the right inferior lobe. Bilateral enlarged thyroid with multiple small nodules.   Sestamebi/Parathyroid scan:  8/29/18 There are findings suggesting possible adenoma adjacent to the medial, superior pole of the right lobe of the thyroid.   Parathyroid protocol CT scan:   8/5/19 9mm parathyroid adenoma posterior to the right lobe of the thyroid. Enlarged multinodular thyroid.       Lab Review    24-Hour Urine Collection:  Date: 8/29/18    Urine Calcium:    15.6 mg/dL   Urine Creatinine:   60 mg/dL                   Creatinine Clearance: 77 ml/min   Urine Calcium/Creatinine Clearance Ratio:    0.19         Component Value Date    Vit D, 25-Hydroxy 32 07/30/2019    PTH, Intact 95.0 (H) 07/30/2019    Calcium 11.1 (H) 07/30/2019    Calcium 11.1 (H) 07/30/2019    Phosphorus 3.2 08/29/2018    TSH 0.914 07/30/2019           ASSESSMENT/PLAN:       Assessment    Latia Posada is an 59 y.o. female who presents with primary hyperparathyroidism, likely due to a parathyroid adenoma. The above testing and examination support the diagnosis. Patient meets criteria for recommending parathyroid surgery. This is based on her history of nephrolithiasis and reduced cortical bone density. She currently has possible localization to the right via imaging (Ultrasound, Sestamebi scan and Parathyroid protocol CT Scan).       Plan    1. Imaging: none needed  2. Medications: patient should continue current medications: OTC Vitamin D  3. The natural history and treatment options for primary hyperparathyroidism were discussed with the patient. Parathyroidectomy is the only curative treatment for primary hyperparathyroidism. Parathyroidectomy, both minimally invasive technique and standard four-gland exploration, and its risks were discussed. I was also able to duscuss  "the expected chris-operative course and the risks of surgery including failure to localize the parathyroid gland, persistent or recurrent hyperparathyroidism with the possibility of the need for a second surgery, temporary or permanent hypoparathyroidism resulting in low blood calcium levels that require extensive medication to avoid serious degenerative conditions such as cataracts, brittle bones, muscle weakness and muscle irritability. Other risks include bleeding, infection, injury to the recurrent laryngeal nerves resulting in hoarseness or impairment of speech and the risks of general anesthetic including MI, CVA, sudden death or even reaction to anesthetic medications.The role of intraoperative PTH monitoring was also discussed. The patient understands the risks, any and all questions were answered to the patients satisfaction. The patient is amenable to surgery. Written consent was obtained and Parathyroidectomy is recommended.  Surgery is scheduled for 10/18/2019.  4. Will ensure that patient is medically optimized prior to procedure. In addition, the patient was given our "Understanding Parathyroid Disease" booklet and directed to the American Association of End[employer] Surgeons Patient Education portal as a resource.   "

## 2019-09-03 ENCOUNTER — TELEPHONE (OUTPATIENT)
Dept: OBSTETRICS AND GYNECOLOGY | Facility: CLINIC | Age: 59
End: 2019-09-03

## 2019-09-03 ENCOUNTER — OFFICE VISIT (OUTPATIENT)
Dept: OBSTETRICS AND GYNECOLOGY | Facility: CLINIC | Age: 59
End: 2019-09-03
Payer: COMMERCIAL

## 2019-09-03 VITALS — HEIGHT: 66 IN | BODY MASS INDEX: 22.15 KG/M2 | WEIGHT: 137.81 LBS

## 2019-09-03 DIAGNOSIS — Z90.710 POST HYSTERECTOMY MENOPAUSE: ICD-10-CM

## 2019-09-03 DIAGNOSIS — E89.40 POST HYSTERECTOMY MENOPAUSE: ICD-10-CM

## 2019-09-03 DIAGNOSIS — N95.1 VASOMOTOR SYMPTOMS DUE TO MENOPAUSE: ICD-10-CM

## 2019-09-03 DIAGNOSIS — Z01.419 WELL WOMAN EXAM WITH ROUTINE GYNECOLOGICAL EXAM: Primary | ICD-10-CM

## 2019-09-03 PROCEDURE — 99386 PREV VISIT NEW AGE 40-64: CPT | Mod: S$GLB,,, | Performed by: NURSE PRACTITIONER

## 2019-09-03 PROCEDURE — 99386 PR PREVENTIVE VISIT,NEW,40-64: ICD-10-PCS | Mod: S$GLB,,, | Performed by: NURSE PRACTITIONER

## 2019-09-03 PROCEDURE — 99999 PR PBB SHADOW E&M-EST. PATIENT-LVL III: ICD-10-PCS | Mod: PBBFAC,,, | Performed by: NURSE PRACTITIONER

## 2019-09-03 PROCEDURE — 99999 PR PBB SHADOW E&M-EST. PATIENT-LVL III: CPT | Mod: PBBFAC,,, | Performed by: NURSE PRACTITIONER

## 2019-09-03 RX ORDER — SODIUM CHLORIDE 9 MG/ML
INJECTION, SOLUTION INTRAVENOUS CONTINUOUS
Status: CANCELLED | OUTPATIENT
Start: 2019-09-03

## 2019-09-03 RX ORDER — LIDOCAINE HYDROCHLORIDE 10 MG/ML
1 INJECTION, SOLUTION EPIDURAL; INFILTRATION; INTRACAUDAL; PERINEURAL ONCE
Status: CANCELLED | OUTPATIENT
Start: 2019-09-03 | End: 2019-09-03

## 2019-09-03 NOTE — PROGRESS NOTES
HISTORY OF PRESENT ILLNESS:    Latia Posada is a 59 y.o. female,   presents today for her routine visit and has no gyn complaints.      Past Medical History:   Diagnosis Date    Chronic kidney disease     Family history of colon cancer 2012    Hypercalcemia 2018    Hyperlipidemia     Hypertension     Kidney stones 2012    Menopause syndrome 2012    Thyroid nodule 2012    Vitamin D deficiency disease 2/3/2014       Past Surgical History:   Procedure Laterality Date    BREAST CYST EXCISION Right     CHOLECYSTECTOMY      COLONOSCOPY N/A 2014    Performed by [facility] Goodman [facility] at [facility] ENDO (4TH FLR)    HYSTERECTOMY  age 42    RONALD (fibroids, AUB), RSO (cyst) ME    OOPHORECTOMY      RSO       MEDICATIONS AND ALLERGIES:      Current Outpatient Medications:     amLODIPine (NORVASC) 10 MG tablet, TAKE 1 TABLET(10 MG) BY MOUTH EVERY DAY, Disp: 90 tablet, Rfl: 3    atorvastatin (LIPITOR) 20 MG tablet, TAKE 1 TABLET(20 MG) BY MOUTH EVERY DAY, Disp: 90 tablet, Rfl: 3    cholecalciferol, vitamin D3, 1,000 unit capsule, Take 2 capsules (2,000 Units total) by mouth once daily. (Patient taking differently: Take 1,000 Units by mouth once daily. ), Disp: 60 capsule, Rfl: 12    losartan (COZAAR) 50 MG tablet, Take 1 tablet (50 mg total) by mouth once daily., Disp: 90 tablet, Rfl: 3    Review of patient's allergies indicates:   Allergen Reactions    Lactose      Cramping, diarrhea       Family History   Problem Relation Age of Onset    Heart disease Mother 45        smoker    Hypertension Mother     Heart disease Father     Kidney disease Father     Diabetes Father     Colon cancer Father     Hypertension Sister     Breast cancer Maternal Grandmother     Heart disease Maternal Grandfather     Ovarian cancer Neg Hx        Social History     Socioeconomic History    Marital status: Single     Spouse name: Not on file    Number of children: Not on file    Years of  education: Not on file    Highest education level: Not on file   Occupational History    Not on file   Social Needs    Financial resource strain: Not on file    Food insecurity:     Worry: Not on file     Inability: Not on file    Transportation needs:     Medical: Not on file     Non-medical: Not on file   Tobacco Use    Smoking status: Never Smoker    Smokeless tobacco: Never Used   Substance and Sexual Activity    Alcohol use: Yes    Drug use: No    Sexual activity: Not Currently     Partners: Male     Birth control/protection: Post-menopausal   Lifestyle    Physical activity:     Days per week: Not on file     Minutes per session: Not on file    Stress: Not on file   Relationships    Social connections:     Talks on phone: Not on file     Gets together: Not on file     Attends Restoration service: Not on file     Active member of club or organization: Not on file     Attends meetings of clubs or organizations: Not on file     Relationship status: Not on file   Other Topics Concern    Not on file   Social History Narrative    Not on file       OB HISTORY: None.     COMPREHENSIVE GYN HISTORY:  PAP History: Denies abnormal Paps.  Infection History: Denies STDs. Denies PID.  Benign History: Reports uterine fibroids. Reports ovarian cysts. Denies endometriosis. Denies other conditions.  Cancer History: Denies cervical cancer. Denies uterine cancer or hyperplasia. Denies ovarian cancer. Denies vulvar cancer or pre-cancer. Denies vaginal cancer or pre-cancer. Denies breast cancer. Denies colon cancer.  Sexual Activity History: Denies currently being sexually active  Menstrual History: Denies menses. Pt is  (hyst age 42)  not on ERT.     ROS:  GENERAL: No weight changes. No swelling. No fatigue. No fever. + VASOMOTOR SX.   CARDIOVASCULAR: No chest pain. No shortness of breath. No leg cramps.   NEUROLOGICAL: No headaches. No vision changes.  MSK: + CHRONIC BACK PAIN.  BREASTS: No pain. No lumps. No  "discharge.  ABDOMEN: No pain. No nausea. No vomiting. No diarrhea.+ CHRONIC CONSTIPATION.  REPRODUCTIVE: No abnormal bleeding.  VULVA: No pain. No lesions. No itching.  VAGINA: No relaxation. No itching. No odor. No discharge. No lesions. No dryness.  URINARY: No incontinence. No nocturia. No frequency. No dysuria.    Ht 5' 6" (1.676 m)   Wt 62.5 kg (137 lb 12.6 oz)   BMI 22.24 kg/m²     PE:  APPEARANCE: Well nourished, well developed, in no acute distress.  AFFECT: WNL, alert and oriented x 3.  SKIN: No acne or hirsutism.  NECK: Neck symmetric without masses or thyromegaly.  NODES: No inguinal, cervical, axillary or femoral lymph node enlargement.  CHEST: Good respiratory effort.   ABDOMEN: Soft. No tenderness or masses.  BREASTS: Symmetrical, no skin changes or visible lesions. No palpable masses, nipple discharge bilaterally.  PELVIC: ATROPHIC EXTERNAL FEMALE GENITALIA without lesions. Normal hair distribution. Adequate perineal body, normal urethral meatus. VAGINA DRY without lesions or discharge. No significant cystocele or rectocele. Bimanual exam shows CERVIX and UTERUS to be SURGICALLY ABSENT. Adnexa without masses or tenderness.  EXTREMITIES: No edema.    DIAGNOSIS:  1. Well woman exam with routine gynecological exam    2. S/P RONALD/RSO    3. Vasomotor symptoms due to menopause        ORDERS:  Mammogram scheduled and up to date on colonoscopy    COUNSELING:  The patient was counseled today on:  -alternative therapies for vasomotor symptoms (not FDA approved) including soy, black cohosh, Vit E and avoidance of triggers such as cigarette smoking, alcohol, humidity, stress and caffeine and alternative Rxs for treatment of menopause symptoms such as antidepressants;  -osteoporosis prevention and regular weight bearing exercise;  -ACS PAP guidelines (no paps), with recommendations for yearly pelvic exams as her uterus and cervix were removed for benign reasons and ovaries remain;  -recommendation for yearly " mammogram;  -to see her primary care physician for all other health maintenance.    FOLLOW-UP with Dr. Best for next routine visit.

## 2019-09-03 NOTE — LETTER
September 3, 2019      Lizzy Joya MD  [address]  [facility] [address]           [facility] - OB/GYN 5th Floor  [address]  [facility] [address]  Phone: [phone]          Patient: Latia Posada   MR Number: [id number]   YOB: 1960   Date of Visit: 9/3/2019       Dear Dr. Lizzy Joya:    Thank you for referring Latia Posada to me for evaluation. Attached you will find relevant portions of my assessment and plan of care.    If you have questions, please do not hesitate to call me. I look forward to following Latia Posada along with you.    Sincerely,    CHRISTINE Figueroa, FORD    Enclosure  CC:  No Recipients    If you would like to receive this communication electronically, please contact [email or web address] or [phone] to request more information on [employer] Link access.    For providers and/or their staff who would like to refer a patient to [facility], please contact us through our one-stop-shop provider referral line, [facility], at [phone].    If you feel you have received this communication in error or would no longer like to receive these types of communications, please e-mail [email or web address]

## 2019-09-04 ENCOUNTER — TELEPHONE (OUTPATIENT)
Dept: SURGERY | Facility: CLINIC | Age: 59
End: 2019-09-04

## 2019-09-04 ENCOUNTER — DOCUMENTATION ONLY (OUTPATIENT)
Dept: SURGERY | Facility: CLINIC | Age: 59
End: 2019-09-04

## 2019-09-04 NOTE — TELEPHONE ENCOUNTER
Called pt to request her preferences regarding day and time of day for pre-admit appt at [facility]. No answer. Left a message.    Will send message to PCP for clearance and call the Special Lab for Intraop PTH Monitoring.

## 2019-09-04 NOTE — PROGRESS NOTES
PCP Medical Clearance Request to Dr. Lizzy Joya:     The above pt is scheduled for a Parathyroidectomy with Dr. Myesha Chaney on 10/18/2019 and she has requested a medical clearance.     Thank you kindly,     Nurse Colin   [id number]

## 2019-09-05 ENCOUNTER — TELEPHONE (OUTPATIENT)
Dept: INTERNAL MEDICINE | Facility: CLINIC | Age: 59
End: 2019-09-05

## 2019-09-05 DIAGNOSIS — Z01.810 PREOP CARDIOVASCULAR EXAM: Primary | ICD-10-CM

## 2019-09-05 NOTE — TELEPHONE ENCOUNTER
----- Message from Patrick Bermudez MA sent at 9/4/2019  5:09 PM CDT -----  Regarding: FW: PCP Medical Clearance Request       ----- Message -----  From: Colin Flores RN  Sent: 9/4/2019   5:07 PM  To: [facility]AN[facility] Staff  Subject: PCP Medical Clearance Request                    The above pt is scheduled for a Parathyroidectomy with Dr. Myesha Chaney on 10/18/2019 and she has requested a medical clearance.     Thank you kindly,     Nurse Colin   [id number]

## 2019-09-10 ENCOUNTER — PATIENT MESSAGE (OUTPATIENT)
Dept: SURGERY | Facility: CLINIC | Age: 59
End: 2019-09-10

## 2019-09-11 ENCOUNTER — TELEPHONE (OUTPATIENT)
Dept: SURGERY | Facility: CLINIC | Age: 59
End: 2019-09-11

## 2019-09-11 NOTE — TELEPHONE ENCOUNTER
Spoke with pt. She provided her preferences to be seen by the anesthesia team. Will request appt.

## 2019-09-13 RX ORDER — BENAZEPRIL HYDROCHLORIDE 40 MG/1
TABLET ORAL
Qty: 90 TABLET | Refills: 0 | Status: SHIPPED | OUTPATIENT
Start: 2019-09-13 | End: 2019-09-17

## 2019-09-16 ENCOUNTER — HOSPITAL ENCOUNTER (OUTPATIENT)
Dept: RADIOLOGY | Facility: HOSPITAL | Age: 59
Discharge: HOME | End: 2019-09-16
Attending: INTERNAL MEDICINE
Payer: COMMERCIAL

## 2019-09-16 DIAGNOSIS — Z12.31 SCREENING MAMMOGRAM, ENCOUNTER FOR: ICD-10-CM

## 2019-09-16 PROCEDURE — 77063 MAMMO DIGITAL SCREENING BILAT WITH TOMOSYNTHESIS_CAD: ICD-10-PCS | Mod: 26,,, | Performed by: RADIOLOGY

## 2019-09-16 PROCEDURE — 77067 MAMMO DIGITAL SCREENING BILAT WITH TOMOSYNTHESIS_CAD: ICD-10-PCS | Mod: 26,,, | Performed by: RADIOLOGY

## 2019-09-16 PROCEDURE — 77067 SCR MAMMO BI INCL CAD: CPT | Mod: 26,,, | Performed by: RADIOLOGY

## 2019-09-16 PROCEDURE — 77067 SCR MAMMO BI INCL CAD: CPT | Mod: TC

## 2019-09-16 PROCEDURE — 77063 BREAST TOMOSYNTHESIS BI: CPT | Mod: 26,,, | Performed by: RADIOLOGY

## 2019-09-17 ENCOUNTER — PATIENT MESSAGE (OUTPATIENT)
Dept: INTERNAL MEDICINE | Facility: CLINIC | Age: 59
End: 2019-09-17

## 2019-09-17 RX ORDER — ASPIRIN 81 MG/1
81 TABLET ORAL DAILY
Qty: 30 TABLET | Refills: 12
Start: 2019-09-17 | End: 2019-10-09

## 2019-10-07 ENCOUNTER — PATIENT MESSAGE (OUTPATIENT)
Dept: SURGERY | Facility: CLINIC | Age: 59
End: 2019-10-07

## 2019-10-09 ENCOUNTER — IMMUNIZATION (OUTPATIENT)
Dept: PHARMACY | Facility: CLINIC | Age: 59
End: 2019-10-09
Payer: COMMERCIAL

## 2019-10-09 ENCOUNTER — OFFICE VISIT (OUTPATIENT)
Dept: INTERNAL MEDICINE | Facility: CLINIC | Age: 59
End: 2019-10-09
Payer: COMMERCIAL

## 2019-10-09 ENCOUNTER — HOSPITAL ENCOUNTER (OUTPATIENT)
Dept: RADIOLOGY | Facility: HOSPITAL | Age: 59
Discharge: HOME | End: 2019-10-09
Attending: SURGERY
Payer: COMMERCIAL

## 2019-10-09 ENCOUNTER — PATIENT OUTREACH (OUTPATIENT)
Dept: ADMINISTRATIVE | Facility: OTHER | Age: 59
End: 2019-10-09

## 2019-10-09 VITALS
OXYGEN SATURATION: 96 % | BODY MASS INDEX: 22.25 KG/M2 | DIASTOLIC BLOOD PRESSURE: 82 MMHG | HEART RATE: 81 BPM | SYSTOLIC BLOOD PRESSURE: 118 MMHG | WEIGHT: 138.44 LBS | HEIGHT: 66 IN

## 2019-10-09 DIAGNOSIS — Z01.810 PREOP CARDIOVASCULAR EXAM: Primary | ICD-10-CM

## 2019-10-09 DIAGNOSIS — I10 ESSENTIAL HYPERTENSION: ICD-10-CM

## 2019-10-09 DIAGNOSIS — E21.0 HYPERPARATHYROIDISM, PRIMARY: ICD-10-CM

## 2019-10-09 DIAGNOSIS — R94.31 ABNORMAL EKG: ICD-10-CM

## 2019-10-09 DIAGNOSIS — E21.0 PRIMARY HYPERPARATHYROIDISM: ICD-10-CM

## 2019-10-09 PROCEDURE — 3079F PR MOST RECENT DIASTOLIC BLOOD PRESSURE 80-89 MM HG: ICD-10-PCS | Mod: CPTII,S$GLB,, | Performed by: INTERNAL MEDICINE

## 2019-10-09 PROCEDURE — 93010 EKG 12-LEAD: ICD-10-PCS | Mod: S$GLB,,, | Performed by: INTERNAL MEDICINE

## 2019-10-09 PROCEDURE — 99215 OFFICE O/P EST HI 40 MIN: CPT | Mod: S$GLB,,, | Performed by: INTERNAL MEDICINE

## 2019-10-09 PROCEDURE — 99999 PR PBB SHADOW E&M-EST. PATIENT-LVL III: ICD-10-PCS | Mod: PBBFAC,,, | Performed by: INTERNAL MEDICINE

## 2019-10-09 PROCEDURE — 71046 X-RAY EXAM CHEST 2 VIEWS: CPT | Mod: 26,,, | Performed by: RADIOLOGY

## 2019-10-09 PROCEDURE — 71046 XR CHEST PA AND LATERAL: ICD-10-PCS | Mod: 26,,, | Performed by: RADIOLOGY

## 2019-10-09 PROCEDURE — 3008F PR BODY MASS INDEX (BMI) DOCUMENTED: ICD-10-PCS | Mod: CPTII,S$GLB,, | Performed by: INTERNAL MEDICINE

## 2019-10-09 PROCEDURE — 99215 PR OFFICE/OUTPT VISIT, EST, LEVL V, 40-54 MIN: ICD-10-PCS | Mod: S$GLB,,, | Performed by: INTERNAL MEDICINE

## 2019-10-09 PROCEDURE — 71046 X-RAY EXAM CHEST 2 VIEWS: CPT | Mod: TC

## 2019-10-09 PROCEDURE — 3079F DIAST BP 80-89 MM HG: CPT | Mod: CPTII,S$GLB,, | Performed by: INTERNAL MEDICINE

## 2019-10-09 PROCEDURE — 93010 ELECTROCARDIOGRAM REPORT: CPT | Mod: S$GLB,,, | Performed by: INTERNAL MEDICINE

## 2019-10-09 PROCEDURE — 99999 PR PBB SHADOW E&M-EST. PATIENT-LVL III: CPT | Mod: PBBFAC,,, | Performed by: INTERNAL MEDICINE

## 2019-10-09 PROCEDURE — 3074F SYST BP LT 130 MM HG: CPT | Mod: CPTII,S$GLB,, | Performed by: INTERNAL MEDICINE

## 2019-10-09 PROCEDURE — 3008F BODY MASS INDEX DOCD: CPT | Mod: CPTII,S$GLB,, | Performed by: INTERNAL MEDICINE

## 2019-10-09 PROCEDURE — 93005 ELECTROCARDIOGRAM TRACING: CPT | Mod: S$GLB,,, | Performed by: INTERNAL MEDICINE

## 2019-10-09 PROCEDURE — 93005 EKG 12-LEAD: ICD-10-PCS | Mod: S$GLB,,, | Performed by: INTERNAL MEDICINE

## 2019-10-09 PROCEDURE — 3074F PR MOST RECENT SYSTOLIC BLOOD PRESSURE < 130 MM HG: ICD-10-PCS | Mod: CPTII,S$GLB,, | Performed by: INTERNAL MEDICINE

## 2019-10-09 RX ORDER — VIT C/E/ZN/COPPR/LUTEIN/ZEAXAN 250MG-90MG
2000 CAPSULE ORAL DAILY
Qty: 60 CAPSULE | Refills: 12 | COMMUNITY
Start: 2019-10-09

## 2019-10-09 RX ORDER — ASPIRIN 81 MG/1
81 TABLET ORAL DAILY
Qty: 30 TABLET | Refills: 12
Start: 2019-10-09 | End: 2020-12-15 | Stop reason: SDUPTHER

## 2019-10-09 NOTE — PROGRESS NOTES
CHIEF COMPLAINT:  The patient is here for preoperative medical optimization prior to surgery.    SURGICAL PROCEDURE:  Parahyroidectomy    SURGEON: Ritu    HISTORY OF PRESENT ILLNESS:  The reason for the visit today as a preoperative consultation prior to surgery.    The patient does not have any active cardiac conditions (does NOT have unstable coronary artery disease, decompensated heart failure, arrhythmia or severe valvular heart disease).    The patient has an exercise capacity of greater than 4 METS without symptoms.    The patient has no clinical risk factors of prior heart failure, stroke, TIA, renal or hepatic insufficiency.  The patient has no respiratory illness.  Chronic medical issues have been stable.    Blood pressure has been well controlled.    PAST MEDICAL HISTORY:  Patient Active Problem List   Diagnosis    Essential hypertension    Menopause syndrome    Thyroid nodule: stable 2014,2017.  Also 8/18 1 year follow up recommended    Mixed hyperlipidemia    Family history of colon cancer: father age 60    Vitamin D deficiency disease    Acquired cyst of kidney: stable on u/s 2017    Calculus of kidney    History of abnormal mammogram    Ganglion cyst of dorsum of left wrist    Ganglion cyst of dorsum of right wrist    Primary hyperparathyroidism       SOCIAL HISTORY:  Never smoked.  Rare alcohol.    FAMILY HISTORY:  Family History   Problem Relation Age of Onset    Heart disease Mother 45        smoker    Hypertension Mother     Heart disease Father     Kidney disease Father     Diabetes Father     Colon cancer Father     Hypertension Sister     Breast cancer Maternal Grandmother     Heart disease Maternal Grandfather     Ovarian cancer Neg Hx        MEDS AND ALLERGIES: Reviewed/reconciled as per MEDCARD and ALLERGY CARD.    REVIEW OF SYSTEMS:  GENERAL:  Feels well  HEENT: No blurred vision, headache or ear pain.  CV: No chest pain, pressure, tightness or shortness of  breath.  Respiratory: No cough or wheezing.  Gastrointestinal: No nausea, vomiting or diarrhea  Genitourinary: No dysuria or hematuria  Psych:  Denies depression, SI or HI.  No thought disorder.    PE:  HEENT: oropharynx clear.  TMs clear.  Neck: No JVD or bruits.  Thyromegaly  Lungs: Clear to auscultation bilaterally  CV: Regular rate and rhythm without murmurs  Abdomen: Soft and nontender with no masses  Extremities: No edema, clubbing or cyanosis  Psych: Alert and oriented x3, no SI or HI.    DATA:  Recent labs acceptable, will be obtained today.  Chest x-ray to be obtained today.    EKG done in office is normal sinus rhythm with with some nonspecific changes, no change from 2018     ASSESSMENT:  1. Preoperative assessment and optimization  2. Hyperparathyroidism  3. HTN    PLAN:  1. Continue current medications, MEDCARD reconciled with the patient  2. Discontinue NSAIDs and aspirin 5-7 days prior to procedure (she has done so)    PREOPERATIVE RISK ASSESSMENT AND RECOMMENDATIONS:  The patient presents for medical evaluation.  She is undergoing noncardiac surgery which carries a low risk of cardiac complications.    The patient has stable cardiac conditions.   New findings on EKG will be addressed with a cardiology assessment, ordered today to be scheduled as soon as possible.  Exercise capacity is good.  She has stable clinical risk factors for surgery.  She would be considered average risk for surgery.    CLEARANCE:  She is medically optimized for anesthesia and surgery, no further testing is necessary.     Flu shot today

## 2019-10-10 ENCOUNTER — HOSPITAL ENCOUNTER (OUTPATIENT)
Dept: PREADMISSION TESTING | Facility: OTHER | Age: 59
Discharge: HOME | End: 2019-10-10
Attending: SURGERY
Payer: COMMERCIAL

## 2019-10-10 ENCOUNTER — TELEPHONE (OUTPATIENT)
Dept: INTERNAL MEDICINE | Facility: CLINIC | Age: 59
End: 2019-10-10

## 2019-10-10 ENCOUNTER — ANESTHESIA EVENT (OUTPATIENT)
Dept: SURGERY | Facility: OTHER | Age: 59
End: 2019-10-10
Payer: COMMERCIAL

## 2019-10-10 VITALS
OXYGEN SATURATION: 94 % | BODY MASS INDEX: 21.86 KG/M2 | SYSTOLIC BLOOD PRESSURE: 117 MMHG | HEART RATE: 79 BPM | HEIGHT: 66 IN | TEMPERATURE: 98 F | DIASTOLIC BLOOD PRESSURE: 79 MMHG | WEIGHT: 136 LBS

## 2019-10-10 RX ORDER — SODIUM CHLORIDE, SODIUM LACTATE, POTASSIUM CHLORIDE, CALCIUM CHLORIDE 600; 310; 30; 20 MG/100ML; MG/100ML; MG/100ML; MG/100ML
INJECTION, SOLUTION INTRAVENOUS CONTINUOUS
Status: CANCELLED | OUTPATIENT
Start: 2019-10-10

## 2019-10-10 RX ORDER — ACETAMINOPHEN 500 MG
1000 TABLET ORAL
Status: CANCELLED | OUTPATIENT
Start: 2019-10-10 | End: 2019-10-10

## 2019-10-10 RX ORDER — LIDOCAINE HYDROCHLORIDE 10 MG/ML
0.5 INJECTION, SOLUTION EPIDURAL; INFILTRATION; INTRACAUDAL; PERINEURAL ONCE
Status: CANCELLED | OUTPATIENT
Start: 2019-10-10 | End: 2019-10-10

## 2019-10-10 RX ORDER — PREGABALIN 75 MG/1
75 CAPSULE ORAL ONCE
Status: CANCELLED | OUTPATIENT
Start: 2019-10-10 | End: 2019-10-10

## 2019-10-10 NOTE — ANESTHESIA PREPROCEDURE EVALUATION
10/10/2019  Latia Posada is a 59 y.o., female.    Anesthesia Evaluation    I have reviewed the Patient Summary Reports.    I have reviewed the Nursing Notes.   I have reviewed the Medications.     Review of Systems  Anesthesia Hx:  Denies Family Hx of Anesthesia complications.  Personal Hx of Anesthesia complications, Post-Operative Nausea/Vomiting, in the past, but not with recent anesthetics / prophylaxis Slow To Awaken/Delayed Emergence and mild, somewhat sensitive to sedation / narcotics   Social:  Non-Smoker    Hematology/Oncology:  Hematology Normal   Oncology Normal     EENT/Dental:EENT/Dental Normal   Cardiovascular:   Hypertension hyperlipidemia ECG has been reviewed. EKG NSR 82, being sent to Cardiology for clearance for poor R wave progression on EKG   Pulmonary:  Pulmonary Normal    Renal/:   renal calculi    Hepatic/GI:  Hepatic/GI Normal    Musculoskeletal:  Musculoskeletal Normal    Neurological:  Neurology Normal    Endocrine:  Endocrine Normal Primary hyperparathyroid, last CA++ 10.1   Dermatological:  Skin Normal    Psych:  Psychiatric Normal           Physical Exam  General:  Well nourished    Airway/Jaw/Neck:  Airway Findings: Mouth Opening: Normal Mallampati: I  TM Distance: Normal, at least 6 cm  Jaw/Neck Findings:  Neck ROM: Normal ROM      Dental:  Dental Findings: In tact, Molar caps        Mental Status:  Mental Status Findings:  Cooperative, Alert and Oriented         Anesthesia Plan  Type of Anesthesia, risks & benefits discussed:  Anesthesia Type:  general  Patient's Preference:   Intra-op Monitoring Plan: standard ASA monitors and arterial line  Intra-op Monitoring Plan Comments:   Post Op Pain Control Plan: multimodal analgesia and per primary service following discharge from PACU  Post Op Pain Control Plan Comments:   Induction:   IV  Beta Blocker:         Informed  Consent: Patient understands risks and agrees with Anesthesia plan.  Questions answered. Anesthesia consent signed with patient.  ASA Score: 2     Day of Surgery Review of History & Physical:    H&P update referred to the surgeon.     Anesthesia Plan Notes: EKG/CXR/Labs in epic, reviewed, as above    Surgeon sending patient for Card clearance, need review        Ready For Surgery From Anesthesia Perspective.

## 2019-10-10 NOTE — TELEPHONE ENCOUNTER
I spoke to her, formal reading of her EKG was acceptable.  She does not need to see Cardiology.  Please cancel her cardiology appointment, she is aware.  Thank you

## 2019-10-10 NOTE — DISCHARGE INSTRUCTIONS
PRE-ADMIT TESTING -  [phone]    [address]  [place] [facility]          Your surgery has been scheduled at [facility]. We are pleased to have the opportunity to serve you. For Further Information please call [phone].    On the day of surgery please report to the Information Desk on the 1st floor.    · CONTACT YOUR PHYSICIAN'S OFFICE THE DAY PRIOR TO YOUR SURGERY TO OBTAIN YOUR ARRIVAL TIME.     · The evening before surgery do not eat anything after 9 p.m. ( this includes hard candy, chewing gum and mints).  You may only have GATORADE, POWERADE AND WATER  from 9 p.m. until you leave your home.   DO NOT DRINK ANY LIQUIDS ON THE WAY TO THE HOSPITAL.      SPECIAL MEDICATION INSTRUCTIONS: TAKE medications checked off by the Anesthesiologist on your Medication List.    Angiogram Patients: Take medications as instructed by your physician, including aspirin.     Surgery Patients:    If you take ASPIRIN - Your PHYSICIAN/SURGEON will need to inform you IF/OR when you need to stop taking aspirin prior to your surgery.     Do Not take any medications containing IBUPROFEN.  Do Not Wear any make-up or dark nail polish   (especially eye make-up) to surgery. If you come to surgery with makeup on you will be required to remove the makeup or nail polish.    Do not shave your surgical area at least 5 days prior to your surgery. The surgical prep will be performed at the hospital according to Infection Control regulations.    Leave all valuables at home.   Do Not wear any jewelry or watches, including any metal in body piercings. Jewelry must be removed prior to coming to the hospital.  There is a possibility that rings that are unable to be removed may be cut off if they are on the surgical extremity.    Contact Lens must be removed before surgery. Either do not wear the contact lens or bring a case and solution for storage.  Please bring a container for eyeglasses or dentures as required.  Bring  any paperwork your physician has provided, such as consent forms,  history and physicals, doctor's orders, etc.   Bring comfortable clothes that are loose fitting to wear upon discharge. Take into consideration the type of surgery being performed.  Maintain your diet as advised per your physician the day prior to surgery.      Adequate rest the night before surgery is advised.   Park in the Parking lot behind the hospital or in the [facility] across the street from the parking lot. Parking is complimentary.  If you will be discharged the same day as your procedure, please arrange for a responsible adult to drive you home or to accompany you if traveling by taxi.   YOU WILL NOT BE PERMITTED TO DRIVE OR TO LEAVE THE HOSPITAL ALONE AFTER SURGERY.   It is strongly recommended that you arrange for someone to remain with you for the first 24 hrs following your surgery.    The Surgeon will speak to your family/visitor after your surgery regarding the outcome of your surgery and post op care.  The Surgeon may speak to you after your surgery, but there is a possibility you may not remember the details.  Please check with your family members regarding the conversation with the Surgeon.    We strongly recommend whoever is bringing you home be present for discharge instructions.  This will ensure a thorough understanding for your post op home care.      Thank you for your cooperation.  The Staff of [facility].                Bathing Instructions with Hibiclens     Shower the evening before and morning of your procedure with Hibiclens:   Wash your face with water and your regular face wash/soap   Apply Hibiclens directly on your skin or on a wet washcloth and wash gently. When showering: Move away from the shower stream when applying Hibiclens to avoid rinsing off too soon.   Rinse thoroughly with warm water   Do not dilute Hibiclens         Dry off as usual, do not use any deodorant,  powder, body lotions, perfume, after shave or cologne.

## 2019-10-17 ENCOUNTER — TELEPHONE (OUTPATIENT)
Dept: SURGERY | Facility: CLINIC | Age: 59
End: 2019-10-17

## 2019-10-17 DIAGNOSIS — E21.0 PRIMARY HYPERPARATHYROIDISM: Primary | ICD-10-CM

## 2019-10-17 NOTE — TELEPHONE ENCOUNTER
Pre-op Call:  Called pt to inform of surgery start time, arrival time and location. Reminded about washing with Hibiclens, making arrangements for someone to drive her home. Informed of post-op appt and post-labs, including holding calcium (tums) AM of lab work. Pt expressed understanding.

## 2019-10-18 ENCOUNTER — ANESTHESIA (OUTPATIENT)
Dept: SURGERY | Facility: OTHER | Age: 59
End: 2019-10-18
Payer: COMMERCIAL

## 2019-10-18 ENCOUNTER — HOSPITAL ENCOUNTER (OUTPATIENT)
Facility: OTHER | Age: 59
Discharge: HOME | End: 2019-10-18
Attending: SURGERY | Admitting: SURGERY
Payer: COMMERCIAL

## 2019-10-18 VITALS
WEIGHT: 136 LBS | RESPIRATION RATE: 18 BRPM | HEART RATE: 85 BPM | OXYGEN SATURATION: 95 % | DIASTOLIC BLOOD PRESSURE: 92 MMHG | TEMPERATURE: 98 F | SYSTOLIC BLOOD PRESSURE: 137 MMHG | HEIGHT: 66 IN | BODY MASS INDEX: 21.86 KG/M2

## 2019-10-18 DIAGNOSIS — E21.0 HYPERPARATHYROIDISM, PRIMARY: Primary | ICD-10-CM

## 2019-10-18 LAB
PTH-INTACT SERPL-MCNC: 161.5 PG/ML (ref 9–77)
PTH-INTACT SERPL-MCNC: 37 PG/ML (ref 9–77)
PTH-INTACT SERPL-MCNC: 46 PG/ML (ref 9–77)
PTH-INTACT SERPL-MCNC: 71.3 PG/ML (ref 9–77)
PTH-INTACT SERPL-MCNC: 78.4 PG/ML (ref 9–77)

## 2019-10-18 PROCEDURE — 27201423 OPTIME MED/SURG SUP & DEVICES STERILE SUPPLY: Performed by: SURGERY

## 2019-10-18 PROCEDURE — 88305 TISSUE EXAM BY PATHOLOGIST: CPT | Mod: 26,,, | Performed by: PATHOLOGY

## 2019-10-18 PROCEDURE — 63600175 PHARM REV CODE 636 W HCPCS: Performed by: SURGERY

## 2019-10-18 PROCEDURE — 37000008 HC ANESTHESIA 1ST 15 MINUTES: Performed by: SURGERY

## 2019-10-18 PROCEDURE — 63600175 PHARM REV CODE 636 W HCPCS: Performed by: NURSE ANESTHETIST, CERTIFIED REGISTERED

## 2019-10-18 PROCEDURE — 37000009 HC ANESTHESIA EA ADD 15 MINS: Performed by: SURGERY

## 2019-10-18 PROCEDURE — 71000039 HC RECOVERY, EACH ADD'L HOUR: Performed by: SURGERY

## 2019-10-18 PROCEDURE — 88331 PATH CONSLTJ SURG 1 BLK 1SPC: CPT | Mod: 26,,, | Performed by: PATHOLOGY

## 2019-10-18 PROCEDURE — 25000003 PHARM REV CODE 250: Performed by: ANESTHESIOLOGY

## 2019-10-18 PROCEDURE — 36000706: Performed by: SURGERY

## 2019-10-18 PROCEDURE — 71000016 HC POSTOP RECOV ADDL HR: Performed by: SURGERY

## 2019-10-18 PROCEDURE — 88331 TISSUE SPECIMEN TO PATHOLOGY - SURGERY: ICD-10-PCS | Mod: 26,,, | Performed by: PATHOLOGY

## 2019-10-18 PROCEDURE — C9290 INJ, BUPIVACAINE LIPOSOME: HCPCS | Performed by: SURGERY

## 2019-10-18 PROCEDURE — 88305 TISSUE SPECIMEN TO PATHOLOGY - SURGERY: ICD-10-PCS | Mod: 26,,, | Performed by: PATHOLOGY

## 2019-10-18 PROCEDURE — 83970 ASSAY OF PARATHORMONE: CPT

## 2019-10-18 PROCEDURE — 60500 PR EXPLORE PARATHYROID GLANDS: ICD-10-PCS | Mod: ,,, | Performed by: SURGERY

## 2019-10-18 PROCEDURE — 71000015 HC POSTOP RECOV 1ST HR: Performed by: SURGERY

## 2019-10-18 PROCEDURE — 25000003 PHARM REV CODE 250: Performed by: NURSE ANESTHETIST, CERTIFIED REGISTERED

## 2019-10-18 PROCEDURE — 60500 EXPLORE PARATHYROID GLANDS: CPT | Mod: ,,, | Performed by: SURGERY

## 2019-10-18 PROCEDURE — 88305 TISSUE EXAM BY PATHOLOGIST: CPT | Performed by: PATHOLOGY

## 2019-10-18 PROCEDURE — 63600175 PHARM REV CODE 636 W HCPCS: Performed by: ANESTHESIOLOGY

## 2019-10-18 PROCEDURE — 71000033 HC RECOVERY, INTIAL HOUR: Performed by: SURGERY

## 2019-10-18 PROCEDURE — 36000707: Performed by: SURGERY

## 2019-10-18 RX ORDER — MIDAZOLAM HYDROCHLORIDE 1 MG/ML
INJECTION INTRAMUSCULAR; INTRAVENOUS
Status: DISCONTINUED | OUTPATIENT
Start: 2019-10-18 | End: 2019-10-18

## 2019-10-18 RX ORDER — OXYCODONE HYDROCHLORIDE 5 MG/1
5 TABLET ORAL
Status: DISCONTINUED | OUTPATIENT
Start: 2019-10-18 | End: 2019-10-18 | Stop reason: HOSPADM

## 2019-10-18 RX ORDER — PREGABALIN 75 MG/1
75 CAPSULE ORAL ONCE
Status: COMPLETED | OUTPATIENT
Start: 2019-10-18 | End: 2019-10-18

## 2019-10-18 RX ORDER — AMOXICILLIN 250 MG
1 CAPSULE ORAL 2 TIMES DAILY
COMMUNITY
Start: 2019-10-18 | End: 2020-12-15

## 2019-10-18 RX ORDER — LIDOCAINE HYDROCHLORIDE 10 MG/ML
1 INJECTION, SOLUTION EPIDURAL; INFILTRATION; INTRACAUDAL; PERINEURAL ONCE
Status: DISCONTINUED | OUTPATIENT
Start: 2019-10-18 | End: 2019-10-18 | Stop reason: HOSPADM

## 2019-10-18 RX ORDER — SUCCINYLCHOLINE CHLORIDE 20 MG/ML
INJECTION INTRAMUSCULAR; INTRAVENOUS
Status: DISCONTINUED | OUTPATIENT
Start: 2019-10-18 | End: 2019-10-18

## 2019-10-18 RX ORDER — SODIUM CHLORIDE, SODIUM LACTATE, POTASSIUM CHLORIDE, CALCIUM CHLORIDE 600; 310; 30; 20 MG/100ML; MG/100ML; MG/100ML; MG/100ML
INJECTION, SOLUTION INTRAVENOUS CONTINUOUS
Status: DISCONTINUED | OUTPATIENT
Start: 2019-10-18 | End: 2019-10-18 | Stop reason: HOSPADM

## 2019-10-18 RX ORDER — HYDROXYZINE HYDROCHLORIDE 25 MG/1
25 TABLET, FILM COATED ORAL EVERY 4 HOURS PRN
Status: DISCONTINUED | OUTPATIENT
Start: 2019-10-18 | End: 2019-10-18 | Stop reason: HOSPADM

## 2019-10-18 RX ORDER — PHENYLEPHRINE HYDROCHLORIDE 10 MG/ML
INJECTION INTRAVENOUS
Status: DISCONTINUED | OUTPATIENT
Start: 2019-10-18 | End: 2019-10-18

## 2019-10-18 RX ORDER — DEXAMETHASONE SODIUM PHOSPHATE 4 MG/ML
INJECTION, SOLUTION INTRA-ARTICULAR; INTRALESIONAL; INTRAMUSCULAR; INTRAVENOUS; SOFT TISSUE
Status: DISCONTINUED | OUTPATIENT
Start: 2019-10-18 | End: 2019-10-18

## 2019-10-18 RX ORDER — LIDOCAINE HYDROCHLORIDE 10 MG/ML
INJECTION, SOLUTION EPIDURAL; INFILTRATION; INTRACAUDAL; PERINEURAL
Status: COMPLETED | OUTPATIENT
Start: 2019-10-18 | End: 2019-10-18

## 2019-10-18 RX ORDER — CALCIUM CARBONATE 400(1000)
TABLET,CHEWABLE ORAL
Refills: 0 | COMMUNITY
Start: 2019-10-18 | End: 2020-12-15

## 2019-10-18 RX ORDER — MEPERIDINE HYDROCHLORIDE 25 MG/ML
12.5 INJECTION INTRAMUSCULAR; INTRAVENOUS; SUBCUTANEOUS ONCE AS NEEDED
Status: DISCONTINUED | OUTPATIENT
Start: 2019-10-18 | End: 2019-10-18 | Stop reason: HOSPADM

## 2019-10-18 RX ORDER — ONDANSETRON 8 MG/1
8 TABLET, ORALLY DISINTEGRATING ORAL EVERY 8 HOURS PRN
Status: DISCONTINUED | OUTPATIENT
Start: 2019-10-18 | End: 2019-10-18 | Stop reason: HOSPADM

## 2019-10-18 RX ORDER — ONDANSETRON 2 MG/ML
INJECTION INTRAMUSCULAR; INTRAVENOUS
Status: DISCONTINUED | OUTPATIENT
Start: 2019-10-18 | End: 2019-10-18

## 2019-10-18 RX ORDER — LIDOCAINE HCL/PF 100 MG/5ML
SYRINGE (ML) INTRAVENOUS
Status: DISCONTINUED | OUTPATIENT
Start: 2019-10-18 | End: 2019-10-18

## 2019-10-18 RX ORDER — HYDROMORPHONE HYDROCHLORIDE 2 MG/ML
0.4 INJECTION, SOLUTION INTRAMUSCULAR; INTRAVENOUS; SUBCUTANEOUS EVERY 5 MIN PRN
Status: DISCONTINUED | OUTPATIENT
Start: 2019-10-18 | End: 2019-10-18 | Stop reason: HOSPADM

## 2019-10-18 RX ORDER — GLYCOPYRROLATE 0.2 MG/ML
INJECTION INTRAMUSCULAR; INTRAVENOUS
Status: DISCONTINUED | OUTPATIENT
Start: 2019-10-18 | End: 2019-10-18

## 2019-10-18 RX ORDER — PROPOFOL 10 MG/ML
VIAL (ML) INTRAVENOUS
Status: DISCONTINUED | OUTPATIENT
Start: 2019-10-18 | End: 2019-10-18

## 2019-10-18 RX ORDER — CEFAZOLIN SODIUM 1 G/3ML
2 INJECTION, POWDER, FOR SOLUTION INTRAMUSCULAR; INTRAVENOUS
Status: COMPLETED | OUTPATIENT
Start: 2019-10-18 | End: 2019-10-18

## 2019-10-18 RX ORDER — LIDOCAINE HYDROCHLORIDE 10 MG/ML
0.5 INJECTION, SOLUTION EPIDURAL; INFILTRATION; INTRACAUDAL; PERINEURAL ONCE
Status: DISCONTINUED | OUTPATIENT
Start: 2019-10-18 | End: 2019-10-18 | Stop reason: HOSPADM

## 2019-10-18 RX ORDER — OXYCODONE AND ACETAMINOPHEN 5; 325 MG/1; MG/1
1 TABLET ORAL EVERY 6 HOURS PRN
Qty: 6 TABLET | Refills: 0 | Status: SHIPPED | OUTPATIENT
Start: 2019-10-18 | End: 2019-10-28

## 2019-10-18 RX ORDER — SODIUM CHLORIDE 9 MG/ML
INJECTION, SOLUTION INTRAVENOUS CONTINUOUS
Status: DISCONTINUED | OUTPATIENT
Start: 2019-10-18 | End: 2019-10-18 | Stop reason: HOSPADM

## 2019-10-18 RX ORDER — ACETAMINOPHEN 500 MG
1000 TABLET ORAL
Status: COMPLETED | OUTPATIENT
Start: 2019-10-18 | End: 2019-10-18

## 2019-10-18 RX ORDER — ONDANSETRON 2 MG/ML
4 INJECTION INTRAMUSCULAR; INTRAVENOUS DAILY PRN
Status: DISCONTINUED | OUTPATIENT
Start: 2019-10-18 | End: 2019-10-18 | Stop reason: HOSPADM

## 2019-10-18 RX ORDER — SODIUM CHLORIDE 0.9 % (FLUSH) 0.9 %
3 SYRINGE (ML) INJECTION
Status: DISCONTINUED | OUTPATIENT
Start: 2019-10-18 | End: 2019-10-18 | Stop reason: HOSPADM

## 2019-10-18 RX ORDER — DIPHENHYDRAMINE HYDROCHLORIDE 50 MG/ML
25 INJECTION INTRAMUSCULAR; INTRAVENOUS EVERY 6 HOURS PRN
Status: DISCONTINUED | OUTPATIENT
Start: 2019-10-18 | End: 2019-10-18 | Stop reason: HOSPADM

## 2019-10-18 RX ORDER — ROCURONIUM BROMIDE 10 MG/ML
INJECTION, SOLUTION INTRAVENOUS
Status: DISCONTINUED | OUTPATIENT
Start: 2019-10-18 | End: 2019-10-18

## 2019-10-18 RX ORDER — AMOXICILLIN 250 MG
1 CAPSULE ORAL 2 TIMES DAILY
Status: DISCONTINUED | OUTPATIENT
Start: 2019-10-18 | End: 2019-10-18 | Stop reason: HOSPADM

## 2019-10-18 RX ORDER — FENTANYL CITRATE 50 UG/ML
INJECTION, SOLUTION INTRAMUSCULAR; INTRAVENOUS
Status: DISCONTINUED | OUTPATIENT
Start: 2019-10-18 | End: 2019-10-18

## 2019-10-18 RX ADMIN — PROPOFOL 30 MG: 10 INJECTION, EMULSION INTRAVENOUS at 07:10

## 2019-10-18 RX ADMIN — ACETAMINOPHEN 1000 MG: 500 TABLET, FILM COATED ORAL at 05:10

## 2019-10-18 RX ADMIN — PHENYLEPHRINE HYDROCHLORIDE 200 MCG: 10 INJECTION INTRAVENOUS at 07:10

## 2019-10-18 RX ADMIN — LIDOCAINE HYDROCHLORIDE 50 MG: 20 INJECTION, SOLUTION INTRAVENOUS at 08:10

## 2019-10-18 RX ADMIN — PREGABALIN 75 MG: 75 CAPSULE ORAL at 05:10

## 2019-10-18 RX ADMIN — OXYCODONE HYDROCHLORIDE 5 MG: 5 TABLET ORAL at 09:10

## 2019-10-18 RX ADMIN — PROPOFOL 200 MG: 10 INJECTION, EMULSION INTRAVENOUS at 07:10

## 2019-10-18 RX ADMIN — SUCCINYLCHOLINE CHLORIDE 140 MG: 20 INJECTION, SOLUTION INTRAMUSCULAR; INTRAVENOUS at 07:10

## 2019-10-18 RX ADMIN — ROCURONIUM BROMIDE 10 MG: 10 INJECTION, SOLUTION INTRAVENOUS at 07:10

## 2019-10-18 RX ADMIN — SODIUM CHLORIDE, SODIUM LACTATE, POTASSIUM CHLORIDE, AND CALCIUM CHLORIDE: 600; 310; 30; 20 INJECTION, SOLUTION INTRAVENOUS at 06:10

## 2019-10-18 RX ADMIN — ONDANSETRON 4 MG: 2 INJECTION INTRAMUSCULAR; INTRAVENOUS at 07:10

## 2019-10-18 RX ADMIN — SODIUM CHLORIDE, SODIUM LACTATE, POTASSIUM CHLORIDE, AND CALCIUM CHLORIDE: 600; 310; 30; 20 INJECTION, SOLUTION INTRAVENOUS at 08:10

## 2019-10-18 RX ADMIN — LIDOCAINE HYDROCHLORIDE 2 MG: 10 INJECTION, SOLUTION EPIDURAL; INFILTRATION; INTRACAUDAL; PERINEURAL at 06:10

## 2019-10-18 RX ADMIN — LIDOCAINE HYDROCHLORIDE 50 MG: 20 INJECTION, SOLUTION INTRAVENOUS at 07:10

## 2019-10-18 RX ADMIN — PHENYLEPHRINE HYDROCHLORIDE 100 MCG: 10 INJECTION INTRAVENOUS at 07:10

## 2019-10-18 RX ADMIN — CARBOXYMETHYLCELLULOSE SODIUM 2 DROP: 2.5 SOLUTION/ DROPS OPHTHALMIC at 07:10

## 2019-10-18 RX ADMIN — FENTANYL CITRATE 50 MCG: 50 INJECTION, SOLUTION INTRAMUSCULAR; INTRAVENOUS at 07:10

## 2019-10-18 RX ADMIN — GLYCOPYRROLATE 0.2 MG: 0.2 INJECTION, SOLUTION INTRAMUSCULAR; INTRAVENOUS at 07:10

## 2019-10-18 RX ADMIN — CEFAZOLIN 2 G: 330 INJECTION, POWDER, FOR SOLUTION INTRAMUSCULAR; INTRAVENOUS at 07:10

## 2019-10-18 RX ADMIN — DEXAMETHASONE SODIUM PHOSPHATE 8 MG: 4 INJECTION, SOLUTION INTRAMUSCULAR; INTRAVENOUS at 07:10

## 2019-10-18 RX ADMIN — MIDAZOLAM HYDROCHLORIDE 2 MG: 1 INJECTION, SOLUTION INTRAMUSCULAR; INTRAVENOUS at 06:10

## 2019-10-18 RX ADMIN — PHENYLEPHRINE HYDROCHLORIDE 0.5 MCG/KG/MIN: 10 INJECTION INTRAVENOUS at 07:10

## 2019-10-18 RX ADMIN — FENTANYL CITRATE 100 MCG: 50 INJECTION, SOLUTION INTRAMUSCULAR; INTRAVENOUS at 07:10

## 2019-10-18 NOTE — TRANSFER OF CARE
"Anesthesia Transfer of Care Note    Patient: Latia Posada    Procedure(s) Performed: Procedure(s) (LRB):  PARATHYROIDECTOMY (N/A)    Patient location: PACU    Anesthesia Type: general    Transport from OR: Transported from OR on 2-3 L/min O2 by NC with adequate spontaneous ventilation    Post pain: adequate analgesia    Post assessment: no apparent anesthetic complications    Post vital signs: stable    Level of consciousness: awake and alert    Nausea/Vomiting: no nausea/vomiting    Complications: none    Transfer of care protocol was followed      Last vitals:   Visit Vitals  BP (!) 159/100 (BP Location: Right arm, Patient Position: Sitting)   Pulse 83   Temp 36.7 °C (98.1 °F) (Oral)   Resp 16   Ht 5' 6" (1.676 m)   Wt 61.7 kg (136 lb)   SpO2 95%   Breastfeeding? No   BMI 21.95 kg/m²     "

## 2019-10-18 NOTE — H&P
I have reviewed the Resident's consultation note. I agree with the findings. Any changes were made directly in the note below.    Myesha Chaney MD  Endocrine Surgery & General Surgery      Consult Note  Endocrine Surgery    Visit Diagnosis: Hyperparathyroidism, primary [E21.0]    SUBJECTIVE:     Patient is a 59 y.o. female who was referred by Dr. Jamey Patiño and is here unaccompanied and presents for evaluation of primary hyperparathyroidism. The patient has had complaints of elevation of the serum calcium and parathormone and history of nephrolithiasis. There is no history of lithium or thiazide medication use. She has not had previous history of head or neck radiation. She admits joint pain, abdominal pain, constipation, weakness and lethargy. She denies sleep disturbance, mood changes, increased urination and increased thirst. She has had many episodes of nephrolithiasis in the past, most recently about a year ago. Furthermore, there is no history of pathologic fractures, malignancy, or personal history of thyroid, adrenal, or pancreatic abnormalities. The patient is vitamin D replete. She consumes a few servings of dietary calcium a week; she is not on calcium supplementationShe does have familial history of endocrinopathies: her father had a subtotal parathyroidectomy for hyperparathyroidism secondary to renal failure.      Review of patient's allergies indicates:   Allergen Reactions    Lactose      Cramping, diarrhea       Current Outpatient Medications   Medication Sig Dispense Refill    amLODIPine (NORVASC) 10 MG tablet TAKE 1 TABLET(10 MG) BY MOUTH EVERY DAY 90 tablet 3    atorvastatin (LIPITOR) 20 MG tablet TAKE 1 TABLET(20 MG) BY MOUTH EVERY DAY 90 tablet 3    cholecalciferol, vitamin D3, 1,000 unit capsule Take 2 capsules (2,000 Units total) by mouth once daily. (Patient taking differently: Take 1,000 Units by mouth once daily. ) 60 capsule 12    losartan (COZAAR) 50 MG tablet Take 1  "tablet (50 mg total) by mouth once daily. 90 tablet 3     No current facility-administered medications for this visit.        Past Medical History:   Diagnosis Date    Chronic kidney disease     Family history of colon cancer 12/4/2012    Hypercalcemia 8/7/2018    Hyperlipidemia     Hypertension     Kidney stones 12/4/2012    Menopause syndrome 12/4/2012    Thyroid nodule 12/4/2012    Vitamin D deficiency disease 2/3/2014     Past Surgical History:   Procedure Laterality Date    BREAST CYST EXCISION Right     CHOLECYSTECTOMY      COLONOSCOPY N/A 4/26/2014    Performed by Karson Goodman MD at [facility] ([address] [place] [address])    HYSTERECTOMY       Social History     Tobacco Use    Smoking status: Never Smoker    Smokeless tobacco: Never Used   Substance Use Topics    Alcohol use: Yes    Drug use: No          Review of Systems:    Review of Systems   Constitutional: Positive for fatigue.   HEENT: Negative.    Respiratory: Negative.    Cardiovascular: Negative.    Endocrine: endocrine negative   Musculoskeletal: Positive for muscle weakness.   Skin: Negative.    Gastrointestinal: Positive for constipation.   Hematological: Negative.          OBJECTIVE:     Vital Signs:  /80   Pulse 91   Temp 98.6 °F (37 °C)   Resp 18   Ht 5' 6" (1.676 m)   Wt 63.3 kg (139 lb 8.8 oz)   BMI 22.52 kg/m²    Body mass index is 22.52 kg/m².      Physical Exam    General:  no distress, see vitals for BMI    Eyes:  conjunctivae/corneas clear   Neck: trachea midline and symmetric, no adenopathy    Thyroid:  thyroid enlarged and thyroid no palpable nodule   Lung: clear to auscultation bilaterally   Heart:  regular rate and rhythm   Abdomen: soft, non-tender; bowel sounds normal; no masses,  no organomegaly   Skin/Extremities: warm and well-perfused   Pulses: 2+ and symmetric   Neuro: normal without focal findings and mental status, speech normal, alert and oriented x3       Imaging    Studies:  Date:       Results:     DEXA:   " 8/29/18 Spine T Score: -2.2, Hip T Score: -1.5,   Femoral neck T Score: -2.3   Ultrasound:  8/5/19 There is a parathyroid adenoma measuring 1.3 x 0.9 x 0.8 cm located posterior to the right inferior lobe. Bilateral enlarged thyroid with multiple small nodules.   Sestamebi/Parathyroid scan:  8/29/18 There are findings suggesting possible adenoma adjacent to the medial, superior pole of the right lobe of the thyroid.   Parathyroid protocol CT scan:   8/5/19 9mm parathyroid adenoma posterior to the right lobe of the thyroid. Enlarged multinodular thyroid.       Lab Review    24-Hour Urine Collection:  Date: 8/29/18    Urine Calcium:    15.6 mg/dL   Urine Creatinine:   60 mg/dL                   Creatinine Clearance: 77 ml/min   Urine Calcium/Creatinine Clearance Ratio:    0.19         Component Value Date    Vit D, 25-Hydroxy 32 07/30/2019    PTH, Intact 95.0 (H) 07/30/2019    Calcium 11.1 (H) 07/30/2019    Calcium 11.1 (H) 07/30/2019    Phosphorus 3.2 08/29/2018    TSH 0.914 07/30/2019           ASSESSMENT/PLAN:       Assessment    Latia Posada is an 59 y.o. female who presents with primary hyperparathyroidism, likely due to a parathyroid adenoma. The above testing and examination support the diagnosis. Patient meets criteria for recommending parathyroid surgery. This is based on her history of nephrolithiasis and reduced cortical bone density. She currently has possible localization to the right via imaging (Ultrasound, Sestamebi scan and Parathyroid protocol CT Scan).       Plan    1. Imaging: none needed  2. Medications: patient should continue current medications: OTC Vitamin D  3. The natural history and treatment options for primary hyperparathyroidism were discussed with the patient. Parathyroidectomy is the only curative treatment for primary hyperparathyroidism. Parathyroidectomy, both minimally invasive technique and standard four-gland exploration, and its risks were discussed. I was also able to duscuss  "the expected chris-operative course and the risks of surgery including failure to localize the parathyroid gland, persistent or recurrent hyperparathyroidism with the possibility of the need for a second surgery, temporary or permanent hypoparathyroidism resulting in low blood calcium levels that require extensive medication to avoid serious degenerative conditions such as cataracts, brittle bones, muscle weakness and muscle irritability. Other risks include bleeding, infection, injury to the recurrent laryngeal nerves resulting in hoarseness or impairment of speech and the risks of general anesthetic including MI, CVA, sudden death or even reaction to anesthetic medications.The role of intraoperative PTH monitoring was also discussed. The patient understands the risks, any and all questions were answered to the patients satisfaction. The patient is amenable to surgery. Written consent was obtained and Parathyroidectomy is recommended.  Surgery is scheduled for 10/18/2019.  4. Will ensure that patient is medically optimized prior to procedure. In addition, the patient was given our "Understanding Parathyroid Disease" booklet and directed to the [employer] Patient Education portal as a resource.   "

## 2019-10-18 NOTE — ANESTHESIA POSTPROCEDURE EVALUATION
Anesthesia Post Evaluation    Patient: Latia Posada    Procedure(s) Performed: Procedure(s) (LRB):  PARATHYROIDECTOMY (N/A)    Final Anesthesia Type: general  Patient location during evaluation: PACU  Patient participation: Yes- Able to Participate  Level of consciousness: awake and alert  Post-procedure vital signs: reviewed and stable  Pain management: adequate  Airway patency: patent  PONV status at discharge: No PONV  Anesthetic complications: no      Cardiovascular status: blood pressure returned to baseline  Respiratory status: unassisted, spontaneous ventilation and room air  Hydration status: euvolemic  Follow-up not needed.          Vitals Value Taken Time   /95 10/18/2019 10:15 AM   Temp 36.4 °C (97.6 °F) 10/18/2019 10:15 AM   Pulse 85 10/18/2019 10:15 AM   Resp 18 10/18/2019 10:15 AM   SpO2 96 % 10/18/2019 10:15 AM         Event Time     Out of Recovery 10:04:25          Pain/Ching Score: Pain Rating Prior to Med Admin: 4 (10/18/2019  9:21 AM)  Pain Rating Post Med Admin: 3 (10/18/2019  9:50 AM)  Ching Score: 10 (10/18/2019  9:50 AM)

## 2019-10-18 NOTE — OR NURSING
Latia Posada has met all discharge criteria from Phase II. Vital Signs are stable, ambulating  without difficulty. Discharge instructions given, patient verbalized understanding. Patient voided. Prescription delivered per outpatient pharmacy. Discharged from facility via wheelchair in stable condition.

## 2019-10-18 NOTE — INTERVAL H&P NOTE
The patient has been examined and the H&P has been reviewed:    I concur with the findings and no changes have occurred since H&P was written.    Anesthesia/Surgery risks, benefits and alternative options discussed and understood by patient/family.          Active Hospital Problems    Diagnosis  POA    Hyperparathyroidism, primary [E21.0]  Yes      Resolved Hospital Problems   No resolved problems to display.

## 2019-10-18 NOTE — DISCHARGE INSTRUCTIONS
Recovering after Endocrine Surgery    Type of Procedure: Parathyroidectomy    Postoperative clinic appointment date: 1-2 weeks after surgery    Activity:  You may resume normal daily activities upon discharge.  This includes walking and climbing stairs.  Avoid heavy lifting and vigorous exercise for approximately 2 weeks.    Showering:  You may resume normal showering and bathing approximately 2 days after surgery.  Your incision does not require special care and it may get wet.    Incision:  Your neck incision is closed with absorbable sutures under the skin.  You will not need to have any stitches removed.      Pain:  After surgery you may experience pain at the incision, generalized neck pain, or a sore throat.  You will be given a prescription for pain relief.  Most patients will still have discomfort 2-3 days after surgery.  Many times, over the counter pain medications, such as Extra Strength Tylenol, are effective.    Driving:  Use your judgment in resuming to drive.  Avoid driving if you are still experiencing neck pain and are using prescription pain medications.      Return to Work:  Recovery after surgery depends on the individual.  Most patients can expect to return to work approximately 1-2 weeks after surgery.    Diet:  You may resume your normal diet after surgery without any restrictions.    Constipation:  Anesthesia and pain medication can cause a decrease in bowel motility.  If you experience constipation postoperatively, you may take over the counter laxatives such as Milk of Magnesia.    Calcium Supplements:  Calcium tablets may be recommended after your surgery.  Most often this is to promote bone health and prevent low blood calcium during recovery. Numbness and tingling in your fingertips or around your mouth may be experienced when calcium levels are low. You may chew up several extra tablets to relieve symptoms from low calcium. If tingling or numbness occurs, take up to 4 extra TUMS with a  glass of milk. If the symptoms do not subside within 30 minutes, please call your surgeon's office.       Medications:  You may resume taking medication as instructed by your MD at discharge from the hospital.     Questions/Concerns:  If you have any questions or concerns please call your doctor's office: Myesha Chaney MD,  [phone] or after hours call [phone] and ask for the General Surgery Resident on call.          Your Surgeon Gave You EXPAREL® (bupivacaine liposome injectable suspension)    To help control your pain after surgery, your surgeon injected EXPAREL into your surgical incision just before the end of the procedure.   EXPAREL is a local analgesic that contains the local anesthetic bupivacaine. Local anesthetics provide pain relief by numbing the tissue  around the surgical site.   EXPAREL is specifically designed to release pain medication over time and can control pain for up to 72 hours.   In addition to EXPAREL, your surgeon may provide other pain medications to control your pain.   Each patient is different and responds differently to painmedication. Depending on how you respond to EXPAREL, you may require less  additional pain medication during your recovery.    Possible Side Effects    Side effects can occur with any medication and it is important not to ignore anything you may be experiencing. Some patients who  received EXPAREL experienced nausea, vomiting, or constipation. Rarely, patients who receive bupivacaine (the active ingredient in  EXPAREL) have experienced numbness and tingling in their mouth or lips, lightheadedness, or anxiety. Speak with your doctor right  away if you think you may be experiencing any of these sensations, or if you have other questions regarding possible side effects.    Your Recovery    When your pain is under control, your body can better focus on healing. This is not the time to test your pain tolerance, or grin and  bear it.Work with your  surgeon and nurse to make your recovery as speedy and pain-free as possible.   Follow the post-op orders your nurse gave you.   Eat a healthy diet and drink plenty of water. Surgery stresses your body; your body responds by needing more energy to heal.      Important Information About EXPAREL  Products that contain bupivacaine, like EXPAREL, may cause a temporary loss of  sensation or the ability to move in the area where bupivacaine was injected.    Date Administered: 10/18/19  Time Administered: 8:30 AM    Other Forms of Bupivicaine should not be administered within 96hrs following administration of EXPAREL.         Anesthesia: After Your Surgery  Youve just had surgery. During surgery, you received medication called anesthesia to keep you comfortable and pain-free. After surgery, you may experience some pain or nausea. This is common. Here are some tips for feeling better and recovering after surgery.    Going home  Your doctor or nurse will show you how to take care of yourself when you go home. He or she will also answer your questions. Have an adult family member or friend drive you home. For the first 24 hours after your surgery:    · Do not drive or use heavy equipment.  · Do not make important decisions or sign legal documents.  · Avoid alcohol.  · Have someone stay with you, if needed. He or she can watch for problems and help keep you safe.  · Take your time getting up from a seated or lying position. You may experience dizziness for 24 hours.    Be sure to keep all follow-up appointments with your doctor. And rest after your procedure for as long as your doctor tells you to.    Coping with pain  If you have pain after surgery, pain medication will help you feel better. Take it as directed, before pain becomes severe. Also, ask your doctor or pharmacist about other ways to control pain, such as with heat, ice, and relaxation. And follow any other instructions your surgeon or nurse gives you.    URINARY  RETENTION  Should you experience a decrease in your urine output or are unable to urinate following surgery, this can be due to the medications given during surgery.  We recommend you going to the nearest Emergency Department.    Tips for taking pain medication  To get the best relief possible, remember these points:    · Pain medications can upset your stomach. Taking them with a little food may help.  · Most pain relievers taken by mouth need at least 20 to 30 minutes to take effect.  · Taking medication on a schedule can help you remember to take it. Try to time your medication so that you can take it before beginning an activity, such as dressing, walking, or sitting down for dinner.  · Constipation is a common side effect of pain medications. Contact your doctor before taking any medications like laxatives or stool softeners to help relieve constipation. Also ask about any dietary restrictions, because drinking lots of fluids and eating foods like fruits and vegetables that are high in fiber can also help. Remember, dont take laxatives unless your surgeon has prescribed them.  · Mixing alcohol and pain medication can cause dizziness and slow your breathing. It can even be fatal. Dont drink alcohol while taking pain medication.  · Pain medication can slow your reflexes. Dont drive or operate machinery while taking pain medication.    If your health care provider tells you to take acetaminophen to help relieve your pain, ask him or her how much you are supposed to take each day. (Acetaminophen is the generic name for Tylenol and other brand-name pain relievers.) Acetaminophen or other pain relievers may interact with your prescription medicines or other over-the-counter (OTC) drugs. Some prescription medications contain acetaminophen along with other active ingredients. Using both prescription and OTC acetaminophen for pain can cause you to overdose. The FDA recommends that you read the labels on your OTC  medications carefully. This will help you to clearly understand the list of active ingredients, dosing instructions, and any warnings. It may also help you avoid taking too much acetaminophen. If you have questions or don't understand the information, ask your pharmacist or health care provider to explain it to you before you take the OTC medication.    Managing nausea  Some people have an upset stomach after surgery. This is often due to anesthesia, pain, pain medications, or the stress of surgery. The following tips will help you manage nausea and get good nutrition as you recover. If you were on a special diet before surgery, ask your doctor if you should follow it during recovery. These tips may help:    · Dont push yourself to eat. Your body will tell you when to eat and how much.  · Start off with clear liquids and soup. They are easier to digest.  · Progress to semi-solid foods (mashed potatoes, applesauce, and gelatin) as you feel ready.  · Slowly move to solid foods. Dont eat fatty, rich, or spicy foods at first.  · Dont force yourself to have three large meals a day. Instead, eat smaller amounts more often.  · Take pain medications with a small amount of solid food, such as crackers or toast to avoid nausea.      Call your surgeon if    · You feel too sleepy, dizzy, or groggy (medication may be too strong).  · You have side effects like nausea, vomiting, or skin changes (rash, itching, or hives).     © 5835-4737 [employer]. [address] [place] [address], [place] [address]. All rights reserved. This information is not intended as a substitute for professional medical care. Always follow your healthcare professional's instructions.    PLEASE FOLLOW ANY OTHER INSTRUCTIONS PROVIDED TO YOU BY DR. POLLOCK!

## 2019-10-18 NOTE — ANESTHESIA PROCEDURE NOTES
Arterial    Diagnosis: Hyperparathyroidism    Patient location during procedure: holding area  Procedure start time: 10/18/2019 6:35 AM  Timeout: 10/18/2019 6:35 AM  Procedure end time: 10/18/2019 6:45 AM    Staffing  Authorizing Provider: Nahum Trent MD  Performing Provider: Nahum Trent MD    Anesthesiologist was present at the time of the procedure.    Preanesthetic Checklist  Completed: patient identified, site marked, surgical consent, pre-op evaluation, timeout performed, IV checked, risks and benefits discussed, monitors and equipment checked and anesthesia consent givenArterial  Skin Prep: chlorhexidine gluconate  Local Infiltration: lidocaine  Orientation: right  Location: radial  Catheter Size: 20 G  Catheter placement by Anatomical landmarks. Heme positive aspiration all ports.Insertion Attempts: 1  Assessment  Dressing: secured with tape and tegaderm  Patient: Tolerated well

## 2019-10-18 NOTE — BRIEF OP NOTE
[facility]  Endocrine Surgery  Brief Operative Note     SUMMARY     Surgery Date: 10/18/2019     Surgeon(s) and Role:     * Myesha Chaney MD - Primary    Assisting Surgeon: None    Pre-op Diagnosis:  Hyperparathyroidism, primary [E21.0]    Post-op Diagnosis:  Post-Op Diagnosis Codes:     * Hyperparathyroidism, primary [E21.0]    Procedure(s) (LRB):  PARATHYROIDECTOMY (N/A)    Anesthesia: General    Description of the findings of the procedure: see operative report    Findings/Key Components:   1) Enlarged right superior parathyroid gland found in posteriorly and removed. 0.41 grams and measurements are 11 mm x 7 mm x 2 mm. MD Figueroa Classification Type: B  2) IOPTH levels as follows:              Baseline - 71.3              Time zero - 161.5              5 minute post - 78.4              10 minute post - 46              15 minute post - 37        Estimated Blood Loss: 20mL         Specimens:   Specimen (12h ago, onward)     Start     Ordered    10/18/19 0744  Specimen to Pathology - Surgery  Once     Comments:  1.  ? Right parathyroid tissue, please weigh and confirm parathyroid tissue      10/18/19 0744                Discharge Note    SUMMARY     Admit Date: 10/18/2019    Discharge Date and Time:  10/18/2019 8:49 AM    Hospital Course (synopsis of major diagnoses, care, treatment, and services provided during the course of the hospital stay):   The patient underwent a Procedure(s) (LRB):  PARATHYROIDECTOMY (N/A) on 10/18/2019 Myesha Chaney MD. The patient was started on calcium supplementation 1000mg Tums twice per day.  The patient was not started on Calcitriol supplementation. The patient did not have a drain placed at surgery.. At the time of discharge, the patient's pain was well-controlled.  She had voided, was ambulatory, and tolerating a diet.    Final Diagnosis: Post-Op Diagnosis Codes:     * Hyperparathyroidism, primary [E21.0]     Disposition: Home or Self  Care    Follow Up/Patient Instructions:   The parathyroidectomy discharge instruction sheet was provided. The patient was instructed to call the surgeon's office with any additional questions or concerns.    Medications:  Reconciled Home Medications:      Medication List      START taking these medications    calcium carbonate 400 mg calcium (1,000 mg) Chew  Take 1000mg elemental calcium twice daily.     oxyCODONE-acetaminophen 5-325 mg per tablet  Commonly known as:  PERCOCET  Take 1 tablet by mouth every 6 (six) hours as needed for Pain. Maximum dose of acetaminophen is 3000 mg from all sources in 24 hours.     senna-docusate 8.6-50 mg 8.6-50 mg per tablet  Commonly known as:  PERICOLACE  Take 1 tablet by mouth 2 (two) times daily.        CONTINUE taking these medications    amLODIPine 10 MG tablet  Commonly known as:  NORVASC  TAKE 1 TABLET(10 MG) BY MOUTH EVERY DAY     aspirin 81 MG EC tablet  Commonly known as:  ECOTRIN  Take 1 tablet (81 mg total) by mouth once daily.     atorvastatin 20 MG tablet  Commonly known as:  LIPITOR  TAKE 1 TABLET(20 MG) BY MOUTH EVERY DAY     cholecalciferol (vitamin D3) 1,000 unit capsule  Commonly known as:  VITAMIN D3  Take 2 capsules (2,000 Units total) by mouth once daily.     losartan 50 MG tablet  Commonly known as:  COZAAR  Take 1 tablet (50 mg total) by mouth once daily.          Discharge Procedure Orders   Diet general     Ice to affected area   Order Comments: 30 minutes on and then 30 minutes off. Repeat     Other restrictions (specify):   Order Comments: May shower immediately.  NO baths or soaks.  No driving while using narcotic pain medication.     Call MD for:  temperature >100.4     Call MD for:  persistent nausea and vomiting     Call MD for:  severe uncontrolled pain     Call MD for:  difficulty breathing, headache or visual disturbances     Call MD for:  redness, tenderness, or signs of infection (pain, swelling, redness, odor or green/yellow discharge around  incision site)     Call MD for:  hives     Call MD for:  persistent dizziness or light-headedness     Call MD for:  extreme fatigue     Call MD for:   Order Comments: If you have numbness and/or tingling around the face, lips, fingertips, toes take four(4) 500mg tablets of calcium carbonate (Tums).  The symptoms should go away in 15-30 minutes.   If the symptoms persist at 30 minutes you can repeat this.  If they still don't go away, call the physician.     No dressing needed     Follow-up Information     Myesha Chaney MD In 2 weeks.    Specialty:  General Surgery  Contact information:  [address] [address] [phone]

## 2019-10-18 NOTE — OP NOTE
Operative Report  Endocrine Surgery    Date: 10/18/2019    Indications: This patient presents with biochemical evidence of primary hyperparathyroidism. Preoperative sestamibi imaging did reveal increased uptake of radionuclide to right upper pole. Patient also underwent Parathyroid protocol CT and Ultrasound which did reveal similar findings. An arterial line was placed.     Pre-Operative Diagnosis: primary hyperparathyroidism    Post-Operative Diagnosis: primary hyperparathyroidism    Procedure: Parathyroidectomy with IOPTH monitoring    Surgeon: Myesha Chaney M.D.    Assistants: None    Anesthesia: General     ASA Class: 2    Findings:  1) Enlarged right superior parathyroid gland found in posteriorly and removed. 0.41 grams and measurements are 11 mm x 7 mm x 2 mm. MD Figueroa Classification Type: B  2) IOPTH levels as follows:   Baseline - 71.3   Time zero - 161.5   5 minute post - 78.4   10 minute post - 46   15 minute post - 37        Estimated Blood Loss (EBL): 20mL    Drains: None    Total IV Fluids: see anesthesia record     Specimens: right superior parathyroid gland- FS confirmed the presence of parathyroid tissue    Procedure in Detail:    The patient was seen in the Holding Room. The risks, benefits, complications, treatment options, and expected outcomes were discussed with the patient. The possibilities of reaction to medication, pulmonary aspiration, bleeding, recurrent infection, possibility of normal findings, recurrent laryngeal nerve damage, the need for additional procedures, failure to diagnose a condition, and creating a complication requiring transfusion or operation were discussed with the patient. The patient concurred with the proposed plan, giving informed consent.  The site of surgery properly noted/marked. The patient was taken to Operating Room, identified as Latia Angon and the procedure verified as Parathyroidectomy. A Time Out was held and the above information  confirmed.    The patient was brought to the operating room and placed supine.  After induction of a general anesthetic, the neck was placed in extension and a pressure bag shoulder roll was placed between the scapulae. The neck was prepped and draped in standard fashion.  A 3 cm transverse cervical incision was created within a natural skin fold.  Dissection was carried through the platysma and flaps were raised both superior and inferiorly. The strap muscles were identified and divided in the midline.  Retractors were placed to expose the right side of the neck.  Using sharp dissection the thyroid lobe was mobilized in a medial direction, exposing the tracheoesophageal groove.  None of the blood supply to the thyroid gland was divided during this dissection.  Further dissection posterior revealed a superior parathyroid gland along the right upper pole, in a posterior location.     This was mobilized with sharp dissection.  The vascular pedicle was clipped and the specimen was submitted to pathology, revealing mild hypercellularity.  Parathyroid assay monitoring was performed intraoperatively revealing significant decrease compared to baseline levels. They decreased from 161.5(Time zero) to 37(15minute post-excision) consistent with biochemical cure and a decision was made to terminate the exploration.      Hemostasis was achieved in the neck with bipolar cautery. Fibrillar was placed into the field of dissection to aid this process. 20 mL of Exparel mixed with Marcaine was placed into the strap muscles and subcutaneous tissues for post-op analgesia. Closure was performed by reapproximating the strap muscles in the midline with an interrupted 3-0 Vicryl suture.  The platysma was reapproximated with 3-0 Vicryl suture and the skin incision was closed with a 5-0 Monocryl knot-less, subcuticular closure. Mastisol and Steri-Strips were applied followed by Telfa and Tegaderm.        Instrument, sponge, and needle counts  were correct prior to closure and at the conclusion of the case.     Implants: None    Complications: None; patient tolerated the procedure well.    Condition: stable    Disposition: PACU - hemodynamically stable.    Attending Attestation: I performed the procedure.

## 2019-10-23 ENCOUNTER — TELEPHONE (OUTPATIENT)
Dept: SURGERY | Facility: CLINIC | Age: 59
End: 2019-10-23

## 2019-10-23 NOTE — TELEPHONE ENCOUNTER
Post-op Call:   Called pt to inquire how the Ms. Posada was progressing during her recovery phase. Left a detailed message. Requested a call back.

## 2019-11-04 ENCOUNTER — OFFICE VISIT (OUTPATIENT)
Dept: SURGERY | Facility: CLINIC | Age: 59
End: 2019-11-04
Attending: SURGERY
Payer: COMMERCIAL

## 2019-11-04 ENCOUNTER — LAB VISIT (OUTPATIENT)
Dept: LAB | Facility: OTHER | Age: 59
End: 2019-11-04
Attending: SURGERY
Payer: COMMERCIAL

## 2019-11-04 VITALS
WEIGHT: 138 LBS | RESPIRATION RATE: 18 BRPM | TEMPERATURE: 98 F | HEART RATE: 89 BPM | HEIGHT: 66 IN | DIASTOLIC BLOOD PRESSURE: 88 MMHG | SYSTOLIC BLOOD PRESSURE: 137 MMHG | BODY MASS INDEX: 22.18 KG/M2

## 2019-11-04 DIAGNOSIS — E21.0 HYPERPARATHYROIDISM, PRIMARY: ICD-10-CM

## 2019-11-04 DIAGNOSIS — E21.0 PRIMARY HYPERPARATHYROIDISM: ICD-10-CM

## 2019-11-04 DIAGNOSIS — Z09 POSTOP CHECK: Primary | ICD-10-CM

## 2019-11-04 LAB
CALCIUM SERPL-MCNC: 10.8 MG/DL (ref 8.7–10.5)
PTH-INTACT SERPL-MCNC: 27.1 PG/ML (ref 9–77)

## 2019-11-04 PROCEDURE — 99024 POSTOP FOLLOW-UP VISIT: CPT | Mod: S$GLB,,, | Performed by: SURGERY

## 2019-11-04 PROCEDURE — 36415 COLL VENOUS BLD VENIPUNCTURE: CPT

## 2019-11-04 PROCEDURE — 82310 ASSAY OF CALCIUM: CPT

## 2019-11-04 PROCEDURE — 99024 PR POST-OP FOLLOW-UP VISIT: ICD-10-PCS | Mod: S$GLB,,, | Performed by: SURGERY

## 2019-11-04 PROCEDURE — 83970 ASSAY OF PARATHORMONE: CPT

## 2019-11-04 NOTE — PATIENT INSTRUCTIONS
"Questions About Your Scar    1. Why is my scar red?    You will notice during the first few weeks after surgery, the scar area will become red, firm, and hard. Scars often seem to become worse before they get better. This is normal. After about 6 weeks, the scar will begin to "mature." This means that the scar will soften and become less red. Over the next 4 months, the scar will slowly soft and will not be as red. It may take up to a year for the scar to mature.    2. What will the scar look like?    Most scars will become soft, flat, white lines over time.    3. When will the scars go away?    A scar is usually permanent. As the scar becomes softer and less red, it will begin to blend into the skin around the scar. It will become less visible.    4. Does putting Vitamin E oil on the scar help?    It is not known for sure if Vitamin E oil helps scars heal faster or makes them less visible. If you choose to use Vitamin E oil on the scar, it won't hurt. Applying any oil or lotion that can add moisture to the skin will help. Two weeks after surgery, it is a good idea to massage a scar gently but firmly for 5 minutes, two to four times each day. Use the oil or lotion of your choice when doing the massage.    5. What about sunlight and scars?    It is important to keep out of direct sunlight for up to one year after surgery. Too much direct sunlight makes a scar darker in color than the skin around the scar. You should use sunscreen with at least SPF #15 when outdoors.    6. What can I do to make the scar look better?    IT simply takes time for a scar to heal. If the scar is less than a year old, the scar may need to mature more. But some scars may get worse. If the scar line has become wide, more surgery can remove the wide area of the scar and make a more narrow line. If the scar has become higher, thicker, and redder, this may be a "keloidal" or "hypertrophic" scar.      Instructions for Scar Tissue Massage    The " "purpose of scar tissue massage is to soften the skin over scars and incision lines in order for the area to heal optimally. This exercise may also help prevent hypertrophic scarring (an area of excessive scar tissue).    A scar begins "maturing" at about three weeks after injury or surgery, and continues for 9 - 12 months.    You may begin performing scar tissue massage two to three weeks after your surgery. The pressure you apply initially should not be painful, rather a light massage to promote circulation. As you are able to tolerate more pressure you may press more firmly on any little bumps or lumps along your incision.    Scar tissue massage is done by using your index and middle fingers. Begin by firmly and systematically applying the lotion in a circular motion across the scar/incision line. The doctor or nurse will demonstrate this method. Imagine your massage breaking up the little cells that have begun to collect at the incision site so that your body may reabsorb those cells.    This exercise should be done consistently for five minutes at a time; once in the morning and once in the evening.    You may use any skin moisturizer or cocoa butter and/or vitamin E cream to perform scar tissue massage.    It is important to remember that the effectiveness of scar tissue massage is obtained from the method in which it is done rather than the type of moisturizer used.    Scar tissue massage should be continued throughout the scar or incision's maturing process (9 - 12 months) as instructed.    It is necessary to protect your scar/incision from the sun's damaging rays for 9 - 12 months after surgery. A SPF (Sun Protection Factor) of 15 or 30 is adequate for most skin types.      After Parathyroidectomy    Parathyroidectomy is to correct primary hyperparathyroidism, which leads to normal calcium levels in the body. Normally bones have an ongoing process of bone breakdown and new bone growth. The terms osteoporosis " and osteopenia describe bones that are thin and at risk for fractures or breaks.    Calcium and vitamin D are the keys to promoting healthy bones. 99% of the calcium in the body is stored in the bones and teeth. Most people do not receive enough calcium with their daily food.    Vitamin D is needed to absorb calcium from food and maintain normal levels. When tested, many people will have low vitamin D levels in the blood.    It is healthy practice to maintain a good intake of both calcium and vitamin D.    Your vitamin D level is:    Lab Results   Component Value Date    [id number] 32 07/30/2019       A normal vitamin D level is greater than 32.    The recommended daily intake is:    Calcium 6774-5380 mg  Vitamin D 800-1000 IU    A multivitamin pill may provide 162 mg of calcium and 400 IU of vitamin D.    It is recommended that an additional vitamin D with calcium supplement be used. A healthy goal is to aim for 1000 mg of calcium and 800 IU of vitamin D daily.    Blood tests after parathyroid surgery will recheck your calcium and PTH or parathyroid hormone levels. Today at your visit after surgery, your blood tests will be completed. In addition you should have a calcium and PTH blood test at 6 and 12 months after your surgery. You can have these tests at your regular doctor's office, or we can order these tests.    After parathyroid surgery your calcium should be in the normal range. A normal calcium level indicates a curative operation. For unclear reasons, the PTH may be elevated in up to 40% of patients with normal calcium.

## 2019-11-04 NOTE — PROGRESS NOTES
"I have reviewed the Resident's progress note. I agree with the findings. Any changes were made directly in the note below.    Myesha Chaney MD  Endocrine Surgery & General Surgery        Subjective:       Latia Posada presents to the clinic 2 weeks following parathyroidectomy. Eating a regular diet without difficulty. Bowel movements are Normal.  The patient is not having any pain.. Patient admits some tingling in her left foot. Patient denies trouble swallowing, trouble speaking and voice changes. She is currently on Calcium 1000 mg BID. She is not taking Rocaltrol.       Objective:      /88   Pulse 89   Temp 97.5 °F (36.4 °C)   Resp 18   Ht 5' 6" (1.676 m)   Wt 62.6 kg (138 lb)   BMI 22.27 kg/m²     General:   alert, appears stated age and cooperative. Chovstek's sign absent.   Incision:   well approximated, healing well, no signs of drainage, no erythema and no dehiscence   Voice:  normal     Pathology:  PARATHYROID TISSUE, RIGHT, EXCISION:  Markedly hypercellular parathyroid tissue (0.41 g).  Negative for malignancy.    Labs:  Lab Results   Component Value Date    CALCIUM 10.8 (H) 11/04/2019    CALCIUM 10.1 10/09/2019    CALCIUM 11.1 (H) 07/30/2019    CALCIUM 11.1 (H) 07/30/2019    TSH 0.914 07/30/2019    TSH 1.357 08/06/2018    TSH 1.040 06/30/2017    [id number] 32 07/30/2019    [id number] 35 08/29/2018    [id number] 35 08/06/2018    PTH 27.1 11/04/2019    PTH 95.0 (H) 07/30/2019    PTH 61.0 08/29/2018    PHOS 3.2 08/29/2018    PHOS 3.6 08/13/2018    PHOS 3.0 05/22/2017            Assessment:     Latia Posada is doing well post-operatively after single parathyroidectomy (right, superior).        Plan:        1. Continue any current medications. Will check calcium and parathyroid levels today in order to wean supplementation.  2. Wound care and scar massage discussed.  3. Pt is to increase activities as tolerated.  Serum calcium testing is recommended at 6 and 12 months following " parathyroidectomy for long term follow-up. Curative parathyroidectomy is defined as normocalcemia for 6 months after operation. In up to 40% of patients, an elevated PTH level has been observed post-operatively despite a normal calcium level. She will continue to see Lizzy Joya MD for long term health care follow-up.

## 2019-11-08 ENCOUNTER — PATIENT MESSAGE (OUTPATIENT)
Dept: SURGERY | Facility: HOSPITAL | Age: 59
End: 2019-11-08

## 2019-12-12 ENCOUNTER — PATIENT MESSAGE (OUTPATIENT)
Dept: ADMINISTRATIVE | Facility: OTHER | Age: 59
End: 2019-12-12

## 2019-12-12 RX ORDER — BENAZEPRIL HYDROCHLORIDE 40 MG/1
TABLET ORAL
Qty: 90 TABLET | Refills: 0 | Status: SHIPPED | OUTPATIENT
Start: 2019-12-12 | End: 2020-07-24

## 2020-07-24 RX ORDER — LOSARTAN POTASSIUM 50 MG/1
TABLET ORAL
Qty: 90 TABLET | Refills: 3 | Status: SHIPPED | OUTPATIENT
Start: 2020-07-24 | End: 2021-07-20

## 2020-09-07 ENCOUNTER — TELEPHONE (OUTPATIENT)
Dept: INTERNAL MEDICINE | Facility: CLINIC | Age: 60
End: 2020-09-07

## 2020-09-07 DIAGNOSIS — I10 ESSENTIAL HYPERTENSION: ICD-10-CM

## 2020-09-07 DIAGNOSIS — Z00.00 PHYSICAL EXAM, ANNUAL: Primary | ICD-10-CM

## 2020-09-08 RX ORDER — ATORVASTATIN CALCIUM 20 MG/1
TABLET, FILM COATED ORAL
Qty: 90 TABLET | Refills: 3 | Status: SHIPPED | OUTPATIENT
Start: 2020-09-08 | End: 2021-10-05

## 2020-09-08 RX ORDER — AMLODIPINE BESYLATE 10 MG/1
TABLET ORAL
Qty: 90 TABLET | Refills: 3 | Status: SHIPPED | OUTPATIENT
Start: 2020-09-08 | End: 2021-09-14 | Stop reason: SDUPTHER

## 2020-09-08 NOTE — TELEPHONE ENCOUNTER
Please call to schedule physical this fall usual labs prior thanks    Also due for mammogram thanks

## 2020-10-12 ENCOUNTER — PATIENT OUTREACH (OUTPATIENT)
Dept: ADMINISTRATIVE | Facility: HOSPITAL | Age: 60
End: 2020-10-12

## 2020-10-12 ENCOUNTER — PATIENT MESSAGE (OUTPATIENT)
Dept: ADMINISTRATIVE | Facility: HOSPITAL | Age: 60
End: 2020-10-12

## 2020-10-12 NOTE — PROGRESS NOTES
Health Maintenance Due   Topic Date Due    HIV Screening  04/18/1976    Aspirin/Antiplatelet Therapy  04/18/1979    Shingles Vaccine (1 of 2) 04/18/2011    Influenza Vaccine (1) 08/01/2020    Mammogram  09/16/2020     LM for pt to call back with a new BP reading to update her chart or to schedule a nurse visit for BP check.  Chart review completed.

## 2020-12-04 ENCOUNTER — PATIENT MESSAGE (OUTPATIENT)
Dept: ADMINISTRATIVE | Facility: HOSPITAL | Age: 60
End: 2020-12-04

## 2020-12-04 ENCOUNTER — PATIENT OUTREACH (OUTPATIENT)
Dept: ADMINISTRATIVE | Facility: HOSPITAL | Age: 60
End: 2020-12-04

## 2020-12-04 NOTE — PROGRESS NOTES
Health Maintenance Due   Topic Date Due    HIV Screening  04/18/1976    Aspirin/Antiplatelet Therapy  04/18/1979    Shingles Vaccine (1 of 2) 04/18/2011    Influenza Vaccine (1) 08/01/2020    Mammogram  09/16/2020     Pt is scheduled for labs on 12/8/2020.  Portal message has been sent to patient regarding overdue mammogram. Chart review completed.

## 2020-12-08 ENCOUNTER — LAB VISIT (OUTPATIENT)
Dept: LAB | Facility: HOSPITAL | Age: 60
End: 2020-12-08
Attending: INTERNAL MEDICINE
Payer: COMMERCIAL

## 2020-12-08 DIAGNOSIS — Z00.00 PHYSICAL EXAM, ANNUAL: ICD-10-CM

## 2020-12-08 DIAGNOSIS — I10 ESSENTIAL HYPERTENSION: ICD-10-CM

## 2020-12-08 LAB
25(OH)D3+25(OH)D2 SERPL-MCNC: 45 NG/ML (ref 30–96)
ALBUMIN SERPL BCP-MCNC: 4.5 G/DL (ref 3.5–5.2)
ALP SERPL-CCNC: 98 U/L (ref 55–135)
ALT SERPL W/O P-5'-P-CCNC: 29 U/L (ref 10–44)
ANION GAP SERPL CALC-SCNC: 11 MMOL/L (ref 8–16)
AST SERPL-CCNC: 22 U/L (ref 10–40)
BASOPHILS # BLD AUTO: 0.05 K/UL (ref 0–0.2)
BASOPHILS NFR BLD: 1.2 % (ref 0–1.9)
BILIRUB SERPL-MCNC: 0.6 MG/DL (ref 0.1–1)
BUN SERPL-MCNC: 11 MG/DL (ref 6–20)
CALCIUM SERPL-MCNC: 10 MG/DL (ref 8.7–10.5)
CHLORIDE SERPL-SCNC: 108 MMOL/L (ref 95–110)
CHOLEST SERPL-MCNC: 134 MG/DL (ref 120–199)
CHOLEST/HDLC SERPL: 2.3 {RATIO} (ref 2–5)
CO2 SERPL-SCNC: 25 MMOL/L (ref 23–29)
CREAT SERPL-MCNC: 0.8 MG/DL (ref 0.5–1.4)
DIFFERENTIAL METHOD: ABNORMAL
EOSINOPHIL # BLD AUTO: 0.1 K/UL (ref 0–0.5)
EOSINOPHIL NFR BLD: 3 % (ref 0–8)
ERYTHROCYTE [DISTWIDTH] IN BLOOD BY AUTOMATED COUNT: 15.8 % (ref 11.5–14.5)
EST. GFR  (AFRICAN AMERICAN): >60 ML/MIN/1.73 M^2
EST. GFR  (NON AFRICAN AMERICAN): >60 ML/MIN/1.73 M^2
GLUCOSE SERPL-MCNC: 103 MG/DL (ref 70–110)
HCT VFR BLD AUTO: 49.6 % (ref 37–48.5)
HDLC SERPL-MCNC: 58 MG/DL (ref 40–75)
HDLC SERPL: 43.3 % (ref 20–50)
HGB BLD-MCNC: 15.7 G/DL (ref 12–16)
IMM GRANULOCYTES # BLD AUTO: 0.01 K/UL (ref 0–0.04)
IMM GRANULOCYTES NFR BLD AUTO: 0.2 % (ref 0–0.5)
LDLC SERPL CALC-MCNC: 61.2 MG/DL (ref 63–159)
LYMPHOCYTES # BLD AUTO: 1.1 K/UL (ref 1–4.8)
LYMPHOCYTES NFR BLD: 24.7 % (ref 18–48)
MCH RBC QN AUTO: 29 PG (ref 27–31)
MCHC RBC AUTO-ENTMCNC: 31.7 G/DL (ref 32–36)
MCV RBC AUTO: 92 FL (ref 82–98)
MONOCYTES # BLD AUTO: 0.5 K/UL (ref 0.3–1)
MONOCYTES NFR BLD: 11.8 % (ref 4–15)
NEUTROPHILS # BLD AUTO: 2.6 K/UL (ref 1.8–7.7)
NEUTROPHILS NFR BLD: 59.1 % (ref 38–73)
NONHDLC SERPL-MCNC: 76 MG/DL
NRBC BLD-RTO: 0 /100 WBC
PLATELET # BLD AUTO: 235 K/UL (ref 150–350)
PMV BLD AUTO: 10.7 FL (ref 9.2–12.9)
POTASSIUM SERPL-SCNC: 4.3 MMOL/L (ref 3.5–5.1)
PROT SERPL-MCNC: 7.7 G/DL (ref 6–8.4)
RBC # BLD AUTO: 5.41 M/UL (ref 4–5.4)
SODIUM SERPL-SCNC: 144 MMOL/L (ref 136–145)
TRIGL SERPL-MCNC: 74 MG/DL (ref 30–150)
TSH SERPL DL<=0.005 MIU/L-ACNC: 1.9 UIU/ML (ref 0.4–4)
WBC # BLD AUTO: 4.34 K/UL (ref 3.9–12.7)

## 2020-12-08 PROCEDURE — 86703 HIV-1/HIV-2 1 RESULT ANTBDY: CPT

## 2020-12-08 PROCEDURE — 84443 ASSAY THYROID STIM HORMONE: CPT

## 2020-12-08 PROCEDURE — 85025 COMPLETE CBC W/AUTO DIFF WBC: CPT

## 2020-12-08 PROCEDURE — 80053 COMPREHEN METABOLIC PANEL: CPT

## 2020-12-08 PROCEDURE — 82306 VITAMIN D 25 HYDROXY: CPT

## 2020-12-08 PROCEDURE — 80061 LIPID PANEL: CPT

## 2020-12-08 PROCEDURE — 36415 COLL VENOUS BLD VENIPUNCTURE: CPT

## 2020-12-09 LAB — HIV 1+2 AB+HIV1 P24 AG SERPL QL IA: NEGATIVE

## 2020-12-15 ENCOUNTER — TELEPHONE (OUTPATIENT)
Dept: INTERNAL MEDICINE | Facility: CLINIC | Age: 60
End: 2020-12-15

## 2020-12-15 ENCOUNTER — OFFICE VISIT (OUTPATIENT)
Dept: INTERNAL MEDICINE | Facility: CLINIC | Age: 60
End: 2020-12-15
Payer: COMMERCIAL

## 2020-12-15 ENCOUNTER — HOSPITAL ENCOUNTER (OUTPATIENT)
Dept: RADIOLOGY | Facility: HOSPITAL | Age: 60
Discharge: HOME | End: 2020-12-15
Attending: INTERNAL MEDICINE
Payer: COMMERCIAL

## 2020-12-15 ENCOUNTER — IMMUNIZATION (OUTPATIENT)
Dept: INTERNAL MEDICINE | Facility: CLINIC | Age: 60
End: 2020-12-15
Payer: COMMERCIAL

## 2020-12-15 ENCOUNTER — TELEPHONE (OUTPATIENT)
Dept: HEMATOLOGY/ONCOLOGY | Facility: CLINIC | Age: 60
End: 2020-12-15

## 2020-12-15 VITALS
HEART RATE: 83 BPM | HEIGHT: 66 IN | OXYGEN SATURATION: 97 % | DIASTOLIC BLOOD PRESSURE: 85 MMHG | WEIGHT: 136 LBS | SYSTOLIC BLOOD PRESSURE: 125 MMHG | BODY MASS INDEX: 21.86 KG/M2

## 2020-12-15 VITALS — WEIGHT: 136.69 LBS | BODY MASS INDEX: 21.97 KG/M2 | HEIGHT: 66 IN

## 2020-12-15 DIAGNOSIS — E78.2 MIXED HYPERLIPIDEMIA: ICD-10-CM

## 2020-12-15 DIAGNOSIS — N20.0 CALCULUS OF KIDNEY: ICD-10-CM

## 2020-12-15 DIAGNOSIS — D75.1 POLYCYTHEMIA: ICD-10-CM

## 2020-12-15 DIAGNOSIS — Z00.00 ANNUAL PHYSICAL EXAM: Primary | ICD-10-CM

## 2020-12-15 DIAGNOSIS — Z12.31 SCREENING MAMMOGRAM, ENCOUNTER FOR: ICD-10-CM

## 2020-12-15 DIAGNOSIS — I10 ESSENTIAL HYPERTENSION: ICD-10-CM

## 2020-12-15 DIAGNOSIS — E21.0 PRIMARY HYPERPARATHYROIDISM: ICD-10-CM

## 2020-12-15 DIAGNOSIS — N28.1 ACQUIRED CYST OF KIDNEY: ICD-10-CM

## 2020-12-15 DIAGNOSIS — D75.1 POLYCYTHEMIA: Primary | ICD-10-CM

## 2020-12-15 DIAGNOSIS — E04.1 THYROID NODULE: ICD-10-CM

## 2020-12-15 DIAGNOSIS — M81.0 OSTEOPOROSIS WITHOUT CURRENT PATHOLOGICAL FRACTURE, UNSPECIFIED OSTEOPOROSIS TYPE: ICD-10-CM

## 2020-12-15 DIAGNOSIS — Z80.0 FAMILY HISTORY OF COLON CANCER: ICD-10-CM

## 2020-12-15 DIAGNOSIS — E55.9 VITAMIN D DEFICIENCY DISEASE: ICD-10-CM

## 2020-12-15 LAB
BACTERIA #/AREA URNS AUTO: NORMAL /HPF
BILIRUB UR QL STRIP: NEGATIVE
CLARITY UR REFRACT.AUTO: CLEAR
COLOR UR AUTO: YELLOW
GLUCOSE UR QL STRIP: NEGATIVE
HGB UR QL STRIP: NEGATIVE
KETONES UR QL STRIP: NEGATIVE
LEUKOCYTE ESTERASE UR QL STRIP: ABNORMAL
MICROSCOPIC COMMENT: NORMAL
NITRITE UR QL STRIP: NEGATIVE
PH UR STRIP: 6 [PH] (ref 5–8)
PROT UR QL STRIP: NEGATIVE
RBC #/AREA URNS AUTO: 0 /HPF (ref 0–4)
SP GR UR STRIP: 1.01 (ref 1–1.03)
SQUAMOUS #/AREA URNS AUTO: 4 /HPF
URN SPEC COLLECT METH UR: ABNORMAL
WBC #/AREA URNS AUTO: 3 /HPF (ref 0–5)

## 2020-12-15 PROCEDURE — 3079F DIAST BP 80-89 MM HG: CPT | Mod: CPTII,S$GLB,, | Performed by: INTERNAL MEDICINE

## 2020-12-15 PROCEDURE — 77067 SCR MAMMO BI INCL CAD: CPT | Mod: TC

## 2020-12-15 PROCEDURE — 3074F PR MOST RECENT SYSTOLIC BLOOD PRESSURE < 130 MM HG: ICD-10-PCS | Mod: CPTII,S$GLB,, | Performed by: INTERNAL MEDICINE

## 2020-12-15 PROCEDURE — 77067 SCR MAMMO BI INCL CAD: CPT | Mod: 26,,, | Performed by: RADIOLOGY

## 2020-12-15 PROCEDURE — 77067 MAMMO DIGITAL SCREENING BILAT WITH TOMOSYNTHESIS_CAD: ICD-10-PCS | Mod: 26,,, | Performed by: RADIOLOGY

## 2020-12-15 PROCEDURE — 90686 FLU VACCINE (QUAD) GREATER THAN OR EQUAL TO 3YO PRESERVATIVE FREE IM: ICD-10-PCS | Mod: S$GLB,,, | Performed by: INTERNAL MEDICINE

## 2020-12-15 PROCEDURE — 90471 IMMUNIZATION ADMIN: CPT | Mod: S$GLB,,, | Performed by: INTERNAL MEDICINE

## 2020-12-15 PROCEDURE — 3079F PR MOST RECENT DIASTOLIC BLOOD PRESSURE 80-89 MM HG: ICD-10-PCS | Mod: CPTII,S$GLB,, | Performed by: INTERNAL MEDICINE

## 2020-12-15 PROCEDURE — 77063 BREAST TOMOSYNTHESIS BI: CPT | Mod: 26,,, | Performed by: RADIOLOGY

## 2020-12-15 PROCEDURE — 81001 URINALYSIS AUTO W/SCOPE: CPT

## 2020-12-15 PROCEDURE — 99999 PR PBB SHADOW E&M-EST. PATIENT-LVL V: ICD-10-PCS | Mod: PBBFAC,,, | Performed by: INTERNAL MEDICINE

## 2020-12-15 PROCEDURE — 3074F SYST BP LT 130 MM HG: CPT | Mod: CPTII,S$GLB,, | Performed by: INTERNAL MEDICINE

## 2020-12-15 PROCEDURE — 90686 IIV4 VACC NO PRSV 0.5 ML IM: CPT | Mod: S$GLB,,, | Performed by: INTERNAL MEDICINE

## 2020-12-15 PROCEDURE — 99396 PREV VISIT EST AGE 40-64: CPT | Mod: 25,S$GLB,, | Performed by: INTERNAL MEDICINE

## 2020-12-15 PROCEDURE — 1126F AMNT PAIN NOTED NONE PRSNT: CPT | Mod: S$GLB,,, | Performed by: INTERNAL MEDICINE

## 2020-12-15 PROCEDURE — 99396 PR PREVENTIVE VISIT,EST,40-64: ICD-10-PCS | Mod: 25,S$GLB,, | Performed by: INTERNAL MEDICINE

## 2020-12-15 PROCEDURE — 77063 MAMMO DIGITAL SCREENING BILAT WITH TOMOSYNTHESIS_CAD: ICD-10-PCS | Mod: 26,,, | Performed by: RADIOLOGY

## 2020-12-15 PROCEDURE — 1126F PR PAIN SEVERITY QUANTIFIED, NO PAIN PRESENT: ICD-10-PCS | Mod: S$GLB,,, | Performed by: INTERNAL MEDICINE

## 2020-12-15 PROCEDURE — 3008F BODY MASS INDEX DOCD: CPT | Mod: CPTII,S$GLB,, | Performed by: INTERNAL MEDICINE

## 2020-12-15 PROCEDURE — 90471 FLU VACCINE (QUAD) GREATER THAN OR EQUAL TO 3YO PRESERVATIVE FREE IM: ICD-10-PCS | Mod: S$GLB,,, | Performed by: INTERNAL MEDICINE

## 2020-12-15 PROCEDURE — 99999 PR PBB SHADOW E&M-EST. PATIENT-LVL V: CPT | Mod: PBBFAC,,, | Performed by: INTERNAL MEDICINE

## 2020-12-15 PROCEDURE — 3008F PR BODY MASS INDEX (BMI) DOCUMENTED: ICD-10-PCS | Mod: CPTII,S$GLB,, | Performed by: INTERNAL MEDICINE

## 2020-12-15 RX ORDER — ASPIRIN 81 MG/1
81 TABLET ORAL DAILY
Qty: 30 TABLET | Refills: 12
Start: 2020-12-15 | End: 2023-11-15

## 2020-12-15 NOTE — TELEPHONE ENCOUNTER
----- Message from Leela Dahl sent at 12/15/2020 12:33 PM CST -----  We are not Hematology. There is no ref in for Hepatology and there is no indication that this pt has liver dx.   ----- Message -----  From: Cate Tadeo  Sent: 12/15/2020  10:40 AM CST  To: [facility] Liver Referral Pool    Please contact patient for an appointment.

## 2020-12-15 NOTE — PATIENT INSTRUCTIONS
Prevention Guidelines, Women Ages 50 to 64  Screening tests and vaccines are an important part of managing your health. Health counseling is essential, too. Below are guidelines for these, for women ages 50 to 64. Talk with your healthcare provider to make sure youre up to date on what you need.  Screening Who needs it How often   Type 2 diabetes or prediabetes All adults beginning at age 45 and adults without symptoms at any age who are overweight or obese and have 1 or more additional risk factors for diabetes. At  least every 3 years   Alcohol misuse All women in this age group At routine exams   Blood pressure All women in this age group Every 2 years if your blood pressure is less than 120/80 mm Hg; yearly if your systolic blood pressure is 120 to 139 mm Hg, or your diastolic blood pressure reading is 80 to 89 mm Hg   Breast cancer All women in this age group Yearly mammogram and clinical breast exam1   Cervical cancer All women in this age group, except women who have had a complete hysterectomy Pap test every 3 years or Pap test with human papillomavirus (HPV) test every 5 years   Chlamydia Women at increased risk for infection At routine exams   Colorectal cancer All women in this age group Flexible sigmoidoscopy every 5 years, or colonoscopy every 10 years, or double-contrast barium enema every 5 years; yearly fecal occult blood test or fecal immunochemical test; or a stool DNA test as often as your health care provider advises; talk with your health care provider about which tests are best for you   Depression All women in this age group At routine exams   Gonorrhea Sexually active women at increased risk for infection At routine exams   Hepatitis C Anyone at increased risk; 1 time for those born between 1945 and 1965 At routine exams   High cholesterol or triglycerides All women in this age group who are at risk for coronary artery disease At least every 5 years   HIV All women At routine exams   Lung  cancer Adults age 55 to 80 who have smoked Yearly screening in smokers with 30 pack-year history of smoking or who quit within 15 years   Obesity All women in this age group At routine exams   Osteoporosis Women who are postmenopausal Ask your healthcare provider   Syphilis Women at increased risk for infection - talk with your healthcare provider At routine exams   Tuberculosis Women at increased risk for infection - talk with your healthcare provider Ask your healthcare provider   Vision All women in this age group Ask your healthcare provider   Vaccine Who needs it How often   Chickenpox (varicella) All women in this age group who have no record of this infection or vaccine 2 doses; the second dose should be given at least 4 weeks after the first dose   Hepatitis A Women at increased risk for infection - talk with your healthcare provider 2 doses given at least 6 months apart   Hepatitis B Women at increased risk for infection - talk with your healthcare provider 3 doses over 6 months; second dose should be given 1 month after the first dose; the third dose should be given at least 2 months after the second dose and at least 4 months after the first dose   Haemophilus influenzaeType B (HIB) Women at increased risk for infection - talk with your healthcare provider 1 to 3 doses   Influenza (flu) All women in this age group Once a year   Measles, mumps, rubella (MMR) Women in this age group through their late 50s who have no record of these infections or vaccines 1 dose   Meningococcal Women at increased risk for infection - talk with your healthcare provider 1 or more doses   Pneumococcal conjugate vaccine (PCV13) and pneumococcal polysaccharide vaccine (PPSV23) Women at increased risk for infection - talk with your healthcare provider PCV13: 1 dose ages 19 to 65 (protects against 13 types of pneumococcal bacteria)  PPSV23: 1 to 2 doses through age 64, or 1 dose at 65 or older (protects against 23 types of  pneumococcal bacteria)   Tetanus/diphtheria/pertussis (Td/Tdap) booster All women in this age group Td every 10 years, or a one-time dose of Tdap instead of a Td booster after age 18, then Td every 10 years   Zoster All women ages 60 and older 1 dose   Counseling Who needs it How often   BRCA gene mutation testing for breast and ovarian cancer susceptibility Women with increased risk for having gene mutation When your risk is known   Breast cancer and chemoprevention Women at high risk for breast cancer When your risk is known   Diet and exercise Women who are overweight or obese When diagnosed, and then at routine exams   Sexually transmitted infection prevention Women at increased risk for infection - talk with your healthcare provider At routine exams   Use of daily aspirin Women ages 55 and up in this age group who are at risk for cardiovascular health problems such as stroke When your risk is known   Use of tobacco and the health effects it can cause All women in this age group Every exam   1American [employer]  Date Last Reviewed: 1/26/2016  © 9424-9000 [employer]. [address] [place] [address], [place] [address]. All rights reserved. This information is not intended as a substitute for professional medical care. Always follow your healthcare professional's instructions.

## 2020-12-15 NOTE — TELEPHONE ENCOUNTER
She needs a consult in HEMATOLOGY, that is the order I placed.   Please call to schedule a Hematology consult thanks

## 2020-12-16 ENCOUNTER — HOSPITAL ENCOUNTER (OUTPATIENT)
Dept: RADIOLOGY | Facility: HOSPITAL | Age: 60
Discharge: HOME | End: 2020-12-16
Attending: INTERNAL MEDICINE
Payer: COMMERCIAL

## 2020-12-16 DIAGNOSIS — N28.1 ACQUIRED CYST OF KIDNEY: ICD-10-CM

## 2020-12-16 DIAGNOSIS — E04.1 THYROID NODULE: ICD-10-CM

## 2020-12-16 PROCEDURE — 76770 US RETROPERITONEAL COMPLETE: ICD-10-PCS | Mod: 26,,, | Performed by: RADIOLOGY

## 2020-12-16 PROCEDURE — 76770 US EXAM ABDO BACK WALL COMP: CPT | Mod: 26,,, | Performed by: RADIOLOGY

## 2020-12-16 PROCEDURE — 76770 US EXAM ABDO BACK WALL COMP: CPT | Mod: TC

## 2020-12-16 PROCEDURE — 76536 US EXAM OF HEAD AND NECK: CPT | Mod: TC

## 2020-12-16 PROCEDURE — 76536 US SOFT TISSUE HEAD NECK THYROID: ICD-10-PCS | Mod: 26,,, | Performed by: RADIOLOGY

## 2020-12-16 PROCEDURE — 76536 US EXAM OF HEAD AND NECK: CPT | Mod: 26,,, | Performed by: RADIOLOGY

## 2020-12-21 ENCOUNTER — PATIENT MESSAGE (OUTPATIENT)
Dept: INTERNAL MEDICINE | Facility: CLINIC | Age: 60
End: 2020-12-21

## 2021-02-17 ENCOUNTER — HOSPITAL ENCOUNTER (OUTPATIENT)
Dept: RADIOLOGY | Facility: CLINIC | Age: 61
Discharge: HOME | End: 2021-02-17
Attending: INTERNAL MEDICINE
Payer: COMMERCIAL

## 2021-02-17 DIAGNOSIS — M81.0 OSTEOPOROSIS WITHOUT CURRENT PATHOLOGICAL FRACTURE, UNSPECIFIED OSTEOPOROSIS TYPE: ICD-10-CM

## 2021-02-17 PROCEDURE — 77080 DXA BONE DENSITY AXIAL: CPT | Mod: 26,,, | Performed by: INTERNAL MEDICINE

## 2021-02-17 PROCEDURE — 77080 DXA BONE DENSITY AXIAL: CPT | Mod: TC

## 2021-02-17 PROCEDURE — 77080 DEXA BONE DENSITY SPINE HIP: ICD-10-PCS | Mod: 26,,, | Performed by: INTERNAL MEDICINE

## 2021-02-25 ENCOUNTER — PATIENT MESSAGE (OUTPATIENT)
Dept: INTERNAL MEDICINE | Facility: CLINIC | Age: 61
End: 2021-02-25

## 2021-03-04 ENCOUNTER — LAB VISIT (OUTPATIENT)
Dept: LAB | Facility: HOSPITAL | Age: 61
End: 2021-03-04
Attending: INTERNAL MEDICINE
Payer: COMMERCIAL

## 2021-03-04 ENCOUNTER — OFFICE VISIT (OUTPATIENT)
Dept: HEMATOLOGY/ONCOLOGY | Facility: CLINIC | Age: 61
End: 2021-03-04
Payer: COMMERCIAL

## 2021-03-04 VITALS
OXYGEN SATURATION: 95 % | WEIGHT: 138.69 LBS | DIASTOLIC BLOOD PRESSURE: 89 MMHG | RESPIRATION RATE: 17 BRPM | BODY MASS INDEX: 22.29 KG/M2 | TEMPERATURE: 98 F | HEIGHT: 66 IN | SYSTOLIC BLOOD PRESSURE: 137 MMHG | HEART RATE: 86 BPM

## 2021-03-04 DIAGNOSIS — D75.1 POLYCYTHEMIA: Primary | ICD-10-CM

## 2021-03-04 DIAGNOSIS — D75.1 POLYCYTHEMIA: ICD-10-CM

## 2021-03-04 PROCEDURE — 1126F PR PAIN SEVERITY QUANTIFIED, NO PAIN PRESENT: ICD-10-PCS | Mod: S$GLB,,, | Performed by: INTERNAL MEDICINE

## 2021-03-04 PROCEDURE — 36415 COLL VENOUS BLD VENIPUNCTURE: CPT | Performed by: INTERNAL MEDICINE

## 2021-03-04 PROCEDURE — 1126F AMNT PAIN NOTED NONE PRSNT: CPT | Mod: S$GLB,,, | Performed by: INTERNAL MEDICINE

## 2021-03-04 PROCEDURE — 99999 PR PBB SHADOW E&M-EST. PATIENT-LVL IV: ICD-10-PCS | Mod: PBBFAC,,, | Performed by: INTERNAL MEDICINE

## 2021-03-04 PROCEDURE — 81270 JAK2 GENE: CPT | Performed by: INTERNAL MEDICINE

## 2021-03-04 PROCEDURE — 3008F BODY MASS INDEX DOCD: CPT | Mod: CPTII,S$GLB,, | Performed by: INTERNAL MEDICINE

## 2021-03-04 PROCEDURE — 3075F PR MOST RECENT SYSTOLIC BLOOD PRESS GE 130-139MM HG: ICD-10-PCS | Mod: CPTII,S$GLB,, | Performed by: INTERNAL MEDICINE

## 2021-03-04 PROCEDURE — 81219 CALR GENE COM VARIANTS: CPT | Performed by: INTERNAL MEDICINE

## 2021-03-04 PROCEDURE — 3075F SYST BP GE 130 - 139MM HG: CPT | Mod: CPTII,S$GLB,, | Performed by: INTERNAL MEDICINE

## 2021-03-04 PROCEDURE — 99204 OFFICE O/P NEW MOD 45 MIN: CPT | Mod: S$GLB,,, | Performed by: INTERNAL MEDICINE

## 2021-03-04 PROCEDURE — 99204 PR OFFICE/OUTPT VISIT, NEW, LEVL IV, 45-59 MIN: ICD-10-PCS | Mod: S$GLB,,, | Performed by: INTERNAL MEDICINE

## 2021-03-04 PROCEDURE — 81403 MOPATH PROCEDURE LEVEL 4: CPT | Performed by: INTERNAL MEDICINE

## 2021-03-04 PROCEDURE — 3079F PR MOST RECENT DIASTOLIC BLOOD PRESSURE 80-89 MM HG: ICD-10-PCS | Mod: CPTII,S$GLB,, | Performed by: INTERNAL MEDICINE

## 2021-03-04 PROCEDURE — 3008F PR BODY MASS INDEX (BMI) DOCUMENTED: ICD-10-PCS | Mod: CPTII,S$GLB,, | Performed by: INTERNAL MEDICINE

## 2021-03-04 PROCEDURE — 99999 PR PBB SHADOW E&M-EST. PATIENT-LVL IV: CPT | Mod: PBBFAC,,, | Performed by: INTERNAL MEDICINE

## 2021-03-04 PROCEDURE — 3079F DIAST BP 80-89 MM HG: CPT | Mod: CPTII,S$GLB,, | Performed by: INTERNAL MEDICINE

## 2021-03-10 LAB
JAK2 EXON 12 MUTATION DETECTION BLOOD: NORMAL
PATH REPORT.FINAL DX SPEC: NORMAL

## 2021-03-15 DIAGNOSIS — D75.1 POLYCYTHEMIA: Primary | ICD-10-CM

## 2021-03-15 LAB
MPNR  FINAL DIAGNOSIS: NORMAL
MPNR  SPECIMEN TYPE: NORMAL
MPNR RESULT: NORMAL

## 2021-03-18 ENCOUNTER — TELEPHONE (OUTPATIENT)
Dept: SLEEP MEDICINE | Facility: CLINIC | Age: 61
End: 2021-03-18

## 2021-03-18 DIAGNOSIS — D75.1 POLYCYTHEMIA: Primary | ICD-10-CM

## 2021-04-05 ENCOUNTER — OFFICE VISIT (OUTPATIENT)
Dept: SLEEP MEDICINE | Facility: CLINIC | Age: 61
End: 2021-04-05
Payer: COMMERCIAL

## 2021-04-05 VITALS
WEIGHT: 138.88 LBS | HEART RATE: 94 BPM | HEIGHT: 66 IN | SYSTOLIC BLOOD PRESSURE: 153 MMHG | DIASTOLIC BLOOD PRESSURE: 90 MMHG | BODY MASS INDEX: 22.32 KG/M2

## 2021-04-05 DIAGNOSIS — R53.83 FATIGUE, UNSPECIFIED TYPE: ICD-10-CM

## 2021-04-05 DIAGNOSIS — R06.83 SNORING: Primary | ICD-10-CM

## 2021-04-05 PROCEDURE — 3008F BODY MASS INDEX DOCD: CPT | Mod: CPTII,S$GLB,, | Performed by: INTERNAL MEDICINE

## 2021-04-05 PROCEDURE — 99204 PR OFFICE/OUTPT VISIT, NEW, LEVL IV, 45-59 MIN: ICD-10-PCS | Mod: S$GLB,,, | Performed by: INTERNAL MEDICINE

## 2021-04-05 PROCEDURE — 3080F DIAST BP >= 90 MM HG: CPT | Mod: CPTII,S$GLB,, | Performed by: INTERNAL MEDICINE

## 2021-04-05 PROCEDURE — 1126F PR PAIN SEVERITY QUANTIFIED, NO PAIN PRESENT: ICD-10-PCS | Mod: S$GLB,,, | Performed by: INTERNAL MEDICINE

## 2021-04-05 PROCEDURE — 99204 OFFICE O/P NEW MOD 45 MIN: CPT | Mod: S$GLB,,, | Performed by: INTERNAL MEDICINE

## 2021-04-05 PROCEDURE — 3008F PR BODY MASS INDEX (BMI) DOCUMENTED: ICD-10-PCS | Mod: CPTII,S$GLB,, | Performed by: INTERNAL MEDICINE

## 2021-04-05 PROCEDURE — 99999 PR PBB SHADOW E&M-EST. PATIENT-LVL III: ICD-10-PCS | Mod: PBBFAC,,, | Performed by: INTERNAL MEDICINE

## 2021-04-05 PROCEDURE — 99999 PR PBB SHADOW E&M-EST. PATIENT-LVL III: CPT | Mod: PBBFAC,,, | Performed by: INTERNAL MEDICINE

## 2021-04-05 PROCEDURE — 3077F PR MOST RECENT SYSTOLIC BLOOD PRESSURE >= 140 MM HG: ICD-10-PCS | Mod: CPTII,S$GLB,, | Performed by: INTERNAL MEDICINE

## 2021-04-05 PROCEDURE — 1126F AMNT PAIN NOTED NONE PRSNT: CPT | Mod: S$GLB,,, | Performed by: INTERNAL MEDICINE

## 2021-04-05 PROCEDURE — 3077F SYST BP >= 140 MM HG: CPT | Mod: CPTII,S$GLB,, | Performed by: INTERNAL MEDICINE

## 2021-04-05 PROCEDURE — 3080F PR MOST RECENT DIASTOLIC BLOOD PRESSURE >= 90 MM HG: ICD-10-PCS | Mod: CPTII,S$GLB,, | Performed by: INTERNAL MEDICINE

## 2021-04-09 ENCOUNTER — TELEPHONE (OUTPATIENT)
Dept: SLEEP MEDICINE | Facility: OTHER | Age: 61
End: 2021-04-09

## 2021-04-22 ENCOUNTER — TELEPHONE (OUTPATIENT)
Dept: SLEEP MEDICINE | Facility: OTHER | Age: 61
End: 2021-04-22

## 2021-04-23 ENCOUNTER — HOSPITAL ENCOUNTER (OUTPATIENT)
Dept: SLEEP MEDICINE | Facility: OTHER | Age: 61
Discharge: HOME | End: 2021-04-23
Attending: INTERNAL MEDICINE
Payer: COMMERCIAL

## 2021-04-23 DIAGNOSIS — R53.83 FATIGUE, UNSPECIFIED TYPE: ICD-10-CM

## 2021-04-23 DIAGNOSIS — R06.83 SNORING: ICD-10-CM

## 2021-04-23 PROCEDURE — 95800 SLP STDY UNATTENDED: CPT

## 2021-04-28 ENCOUNTER — PATIENT MESSAGE (OUTPATIENT)
Dept: SLEEP MEDICINE | Facility: CLINIC | Age: 61
End: 2021-04-28

## 2021-04-28 DIAGNOSIS — G47.33 OSA (OBSTRUCTIVE SLEEP APNEA): Primary | ICD-10-CM

## 2021-04-29 PROBLEM — G47.33 OSA (OBSTRUCTIVE SLEEP APNEA): Status: ACTIVE | Noted: 2021-04-29

## 2021-06-24 ENCOUNTER — OFFICE VISIT (OUTPATIENT)
Dept: SLEEP MEDICINE | Facility: CLINIC | Age: 61
End: 2021-06-24
Payer: COMMERCIAL

## 2021-06-24 VITALS
WEIGHT: 134.94 LBS | DIASTOLIC BLOOD PRESSURE: 95 MMHG | SYSTOLIC BLOOD PRESSURE: 140 MMHG | HEIGHT: 66 IN | BODY MASS INDEX: 21.69 KG/M2 | HEART RATE: 103 BPM

## 2021-06-24 DIAGNOSIS — R35.1 NOCTURIA: ICD-10-CM

## 2021-06-24 DIAGNOSIS — Z12.11 SPECIAL SCREENING FOR MALIGNANT NEOPLASM OF COLON: Primary | ICD-10-CM

## 2021-06-24 DIAGNOSIS — R53.83 FATIGUE, UNSPECIFIED TYPE: ICD-10-CM

## 2021-06-24 DIAGNOSIS — G47.10 PERSISTENT DISORDER OF INITIATING OR MAINTAINING WAKEFULNESS: ICD-10-CM

## 2021-06-24 DIAGNOSIS — G47.33 OSA (OBSTRUCTIVE SLEEP APNEA): Primary | ICD-10-CM

## 2021-06-24 PROCEDURE — 99214 OFFICE O/P EST MOD 30 MIN: CPT | Mod: S$GLB,,, | Performed by: INTERNAL MEDICINE

## 2021-06-24 PROCEDURE — 99214 PR OFFICE/OUTPT VISIT, EST, LEVL IV, 30-39 MIN: ICD-10-PCS | Mod: S$GLB,,, | Performed by: INTERNAL MEDICINE

## 2021-06-24 PROCEDURE — 1126F PR PAIN SEVERITY QUANTIFIED, NO PAIN PRESENT: ICD-10-PCS | Mod: S$GLB,,, | Performed by: INTERNAL MEDICINE

## 2021-06-24 PROCEDURE — 3008F PR BODY MASS INDEX (BMI) DOCUMENTED: ICD-10-PCS | Mod: CPTII,S$GLB,, | Performed by: INTERNAL MEDICINE

## 2021-06-24 PROCEDURE — 3008F BODY MASS INDEX DOCD: CPT | Mod: CPTII,S$GLB,, | Performed by: INTERNAL MEDICINE

## 2021-06-24 PROCEDURE — 1126F AMNT PAIN NOTED NONE PRSNT: CPT | Mod: S$GLB,,, | Performed by: INTERNAL MEDICINE

## 2021-06-24 PROCEDURE — 99999 PR PBB SHADOW E&M-EST. PATIENT-LVL III: CPT | Mod: PBBFAC,,, | Performed by: INTERNAL MEDICINE

## 2021-06-24 PROCEDURE — 99999 PR PBB SHADOW E&M-EST. PATIENT-LVL III: ICD-10-PCS | Mod: PBBFAC,,, | Performed by: INTERNAL MEDICINE

## 2021-06-28 ENCOUNTER — PATIENT MESSAGE (OUTPATIENT)
Dept: PHARMACY | Facility: CLINIC | Age: 61
End: 2021-06-28

## 2021-07-28 ENCOUNTER — OFFICE VISIT (OUTPATIENT)
Dept: INTERNAL MEDICINE | Facility: CLINIC | Age: 61
End: 2021-07-28
Payer: COMMERCIAL

## 2021-07-28 ENCOUNTER — LAB VISIT (OUTPATIENT)
Dept: LAB | Facility: HOSPITAL | Age: 61
End: 2021-07-28
Attending: INTERNAL MEDICINE
Payer: COMMERCIAL

## 2021-07-28 ENCOUNTER — PATIENT MESSAGE (OUTPATIENT)
Dept: ADMINISTRATIVE | Facility: OTHER | Age: 61
End: 2021-07-28

## 2021-07-28 VITALS
DIASTOLIC BLOOD PRESSURE: 80 MMHG | WEIGHT: 136 LBS | HEIGHT: 66 IN | SYSTOLIC BLOOD PRESSURE: 140 MMHG | BODY MASS INDEX: 21.86 KG/M2

## 2021-07-28 DIAGNOSIS — E21.0 PRIMARY HYPERPARATHYROIDISM: ICD-10-CM

## 2021-07-28 DIAGNOSIS — I10 ESSENTIAL HYPERTENSION: ICD-10-CM

## 2021-07-28 DIAGNOSIS — E04.9 GOITER: Primary | ICD-10-CM

## 2021-07-28 DIAGNOSIS — Z80.0 FAMILY HISTORY OF COLON CANCER: ICD-10-CM

## 2021-07-28 DIAGNOSIS — E04.9 GOITER: ICD-10-CM

## 2021-07-28 LAB
ALBUMIN SERPL BCP-MCNC: 4.4 G/DL (ref 3.5–5.2)
ALP SERPL-CCNC: 80 U/L (ref 55–135)
ALT SERPL W/O P-5'-P-CCNC: 20 U/L (ref 10–44)
ANION GAP SERPL CALC-SCNC: 10 MMOL/L (ref 8–16)
AST SERPL-CCNC: 17 U/L (ref 10–40)
BILIRUB SERPL-MCNC: 0.6 MG/DL (ref 0.1–1)
BUN SERPL-MCNC: 14 MG/DL (ref 6–20)
CALCIUM SERPL-MCNC: 10.7 MG/DL (ref 8.7–10.5)
CHLORIDE SERPL-SCNC: 111 MMOL/L (ref 95–110)
CO2 SERPL-SCNC: 24 MMOL/L (ref 23–29)
CREAT SERPL-MCNC: 0.9 MG/DL (ref 0.5–1.4)
EST. GFR  (AFRICAN AMERICAN): >60 ML/MIN/1.73 M^2
EST. GFR  (NON AFRICAN AMERICAN): >60 ML/MIN/1.73 M^2
GLUCOSE SERPL-MCNC: 96 MG/DL (ref 70–110)
POTASSIUM SERPL-SCNC: 4.1 MMOL/L (ref 3.5–5.1)
PROT SERPL-MCNC: 7.7 G/DL (ref 6–8.4)
PTH-INTACT SERPL-MCNC: 40 PG/ML (ref 9–77)
SODIUM SERPL-SCNC: 145 MMOL/L (ref 136–145)
TSH SERPL DL<=0.005 MIU/L-ACNC: 1.01 UIU/ML (ref 0.4–4)

## 2021-07-28 PROCEDURE — 80053 COMPREHEN METABOLIC PANEL: CPT | Performed by: INTERNAL MEDICINE

## 2021-07-28 PROCEDURE — 3077F PR MOST RECENT SYSTOLIC BLOOD PRESSURE >= 140 MM HG: ICD-10-PCS | Mod: CPTII,S$GLB,, | Performed by: INTERNAL MEDICINE

## 2021-07-28 PROCEDURE — 1159F PR MEDICATION LIST DOCUMENTED IN MEDICAL RECORD: ICD-10-PCS | Mod: CPTII,S$GLB,, | Performed by: INTERNAL MEDICINE

## 2021-07-28 PROCEDURE — 1159F MED LIST DOCD IN RCRD: CPT | Mod: CPTII,S$GLB,, | Performed by: INTERNAL MEDICINE

## 2021-07-28 PROCEDURE — 84443 ASSAY THYROID STIM HORMONE: CPT | Performed by: INTERNAL MEDICINE

## 2021-07-28 PROCEDURE — 1126F PR PAIN SEVERITY QUANTIFIED, NO PAIN PRESENT: ICD-10-PCS | Mod: CPTII,S$GLB,, | Performed by: INTERNAL MEDICINE

## 2021-07-28 PROCEDURE — 3077F SYST BP >= 140 MM HG: CPT | Mod: CPTII,S$GLB,, | Performed by: INTERNAL MEDICINE

## 2021-07-28 PROCEDURE — 99214 OFFICE O/P EST MOD 30 MIN: CPT | Mod: S$GLB,,, | Performed by: INTERNAL MEDICINE

## 2021-07-28 PROCEDURE — 83970 ASSAY OF PARATHORMONE: CPT | Performed by: INTERNAL MEDICINE

## 2021-07-28 PROCEDURE — 99999 PR PBB SHADOW E&M-EST. PATIENT-LVL III: ICD-10-PCS | Mod: PBBFAC,,, | Performed by: INTERNAL MEDICINE

## 2021-07-28 PROCEDURE — 3008F BODY MASS INDEX DOCD: CPT | Mod: CPTII,S$GLB,, | Performed by: INTERNAL MEDICINE

## 2021-07-28 PROCEDURE — 99999 PR PBB SHADOW E&M-EST. PATIENT-LVL III: CPT | Mod: PBBFAC,,, | Performed by: INTERNAL MEDICINE

## 2021-07-28 PROCEDURE — 1126F AMNT PAIN NOTED NONE PRSNT: CPT | Mod: CPTII,S$GLB,, | Performed by: INTERNAL MEDICINE

## 2021-07-28 PROCEDURE — 3079F DIAST BP 80-89 MM HG: CPT | Mod: CPTII,S$GLB,, | Performed by: INTERNAL MEDICINE

## 2021-07-28 PROCEDURE — 3008F PR BODY MASS INDEX (BMI) DOCUMENTED: ICD-10-PCS | Mod: CPTII,S$GLB,, | Performed by: INTERNAL MEDICINE

## 2021-07-28 PROCEDURE — 99214 PR OFFICE/OUTPT VISIT, EST, LEVL IV, 30-39 MIN: ICD-10-PCS | Mod: S$GLB,,, | Performed by: INTERNAL MEDICINE

## 2021-07-28 PROCEDURE — 36415 COLL VENOUS BLD VENIPUNCTURE: CPT | Performed by: INTERNAL MEDICINE

## 2021-07-28 PROCEDURE — 3079F PR MOST RECENT DIASTOLIC BLOOD PRESSURE 80-89 MM HG: ICD-10-PCS | Mod: CPTII,S$GLB,, | Performed by: INTERNAL MEDICINE

## 2021-07-28 RX ORDER — LOSARTAN POTASSIUM 100 MG/1
100 TABLET ORAL DAILY
Qty: 30 TABLET | Refills: 6 | Status: SHIPPED | OUTPATIENT
Start: 2021-07-28 | End: 2021-09-14

## 2021-07-29 ENCOUNTER — HOSPITAL ENCOUNTER (EMERGENCY)
Facility: OTHER | Age: 61
Discharge: HOME | End: 2021-07-29
Attending: EMERGENCY MEDICINE
Payer: COMMERCIAL

## 2021-07-29 VITALS
TEMPERATURE: 98 F | WEIGHT: 136 LBS | SYSTOLIC BLOOD PRESSURE: 157 MMHG | HEART RATE: 78 BPM | DIASTOLIC BLOOD PRESSURE: 92 MMHG | HEIGHT: 66 IN | OXYGEN SATURATION: 99 % | BODY MASS INDEX: 21.86 KG/M2 | RESPIRATION RATE: 17 BRPM

## 2021-07-29 DIAGNOSIS — R31.9 HEMATURIA, UNSPECIFIED TYPE: ICD-10-CM

## 2021-07-29 DIAGNOSIS — R10.12 LEFT UPPER QUADRANT ABDOMINAL PAIN: Primary | ICD-10-CM

## 2021-07-29 DIAGNOSIS — N20.0 BILATERAL NEPHROLITHIASIS: ICD-10-CM

## 2021-07-29 LAB
ALBUMIN SERPL BCP-MCNC: 4.7 G/DL (ref 3.5–5.2)
ALP SERPL-CCNC: 82 U/L (ref 55–135)
ALT SERPL W/O P-5'-P-CCNC: 23 U/L (ref 10–44)
ANION GAP SERPL CALC-SCNC: 12 MMOL/L (ref 8–16)
AST SERPL-CCNC: 20 U/L (ref 10–40)
BACTERIA #/AREA URNS HPF: ABNORMAL /HPF
BASOPHILS # BLD AUTO: 0.04 K/UL (ref 0–0.2)
BASOPHILS NFR BLD: 0.5 % (ref 0–1.9)
BILIRUB SERPL-MCNC: 0.7 MG/DL (ref 0.1–1)
BILIRUB UR QL STRIP: NEGATIVE
BUN SERPL-MCNC: 15 MG/DL (ref 6–20)
CALCIUM SERPL-MCNC: 10.3 MG/DL (ref 8.7–10.5)
CHLORIDE SERPL-SCNC: 108 MMOL/L (ref 95–110)
CLARITY UR: ABNORMAL
CO2 SERPL-SCNC: 23 MMOL/L (ref 23–29)
COLOR UR: YELLOW
CREAT SERPL-MCNC: 0.8 MG/DL (ref 0.5–1.4)
DIFFERENTIAL METHOD: ABNORMAL
EOSINOPHIL # BLD AUTO: 0 K/UL (ref 0–0.5)
EOSINOPHIL NFR BLD: 0.1 % (ref 0–8)
ERYTHROCYTE [DISTWIDTH] IN BLOOD BY AUTOMATED COUNT: 15.2 % (ref 11.5–14.5)
EST. GFR  (AFRICAN AMERICAN): >60 ML/MIN/1.73 M^2
EST. GFR  (NON AFRICAN AMERICAN): >60 ML/MIN/1.73 M^2
GLUCOSE SERPL-MCNC: 112 MG/DL (ref 70–110)
GLUCOSE UR QL STRIP: NEGATIVE
HCT VFR BLD AUTO: 47.9 % (ref 37–48.5)
HGB BLD-MCNC: 15.5 G/DL (ref 12–16)
HGB UR QL STRIP: ABNORMAL
IMM GRANULOCYTES # BLD AUTO: 0.02 K/UL (ref 0–0.04)
IMM GRANULOCYTES NFR BLD AUTO: 0.3 % (ref 0–0.5)
KETONES UR QL STRIP: ABNORMAL
LEUKOCYTE ESTERASE UR QL STRIP: NEGATIVE
LIPASE SERPL-CCNC: 10 U/L (ref 4–60)
LYMPHOCYTES # BLD AUTO: 0.7 K/UL (ref 1–4.8)
LYMPHOCYTES NFR BLD: 8.6 % (ref 18–48)
MCH RBC QN AUTO: 29.3 PG (ref 27–31)
MCHC RBC AUTO-ENTMCNC: 32.4 G/DL (ref 32–36)
MCV RBC AUTO: 91 FL (ref 82–98)
MICROSCOPIC COMMENT: ABNORMAL
MONOCYTES # BLD AUTO: 0.3 K/UL (ref 0.3–1)
MONOCYTES NFR BLD: 3.6 % (ref 4–15)
NEUTROPHILS # BLD AUTO: 6.9 K/UL (ref 1.8–7.7)
NEUTROPHILS NFR BLD: 86.9 % (ref 38–73)
NITRITE UR QL STRIP: NEGATIVE
NRBC BLD-RTO: 0 /100 WBC
PH UR STRIP: 6 [PH] (ref 5–8)
PLATELET # BLD AUTO: 236 K/UL (ref 150–450)
PMV BLD AUTO: 9.3 FL (ref 9.2–12.9)
POTASSIUM SERPL-SCNC: 3.5 MMOL/L (ref 3.5–5.1)
PROT SERPL-MCNC: 7.9 G/DL (ref 6–8.4)
PROT UR QL STRIP: ABNORMAL
RBC # BLD AUTO: 5.29 M/UL (ref 4–5.4)
RBC #/AREA URNS HPF: 95 /HPF (ref 0–4)
SODIUM SERPL-SCNC: 143 MMOL/L (ref 136–145)
SP GR UR STRIP: 1.02 (ref 1–1.03)
SQUAMOUS #/AREA URNS HPF: 5 /HPF
TROPONIN I SERPL DL<=0.01 NG/ML-MCNC: <0.006 NG/ML (ref 0–0.03)
URN SPEC COLLECT METH UR: ABNORMAL
UROBILINOGEN UR STRIP-ACNC: NEGATIVE EU/DL
WBC # BLD AUTO: 7.88 K/UL (ref 3.9–12.7)
WBC #/AREA URNS HPF: 2 /HPF (ref 0–5)

## 2021-07-29 PROCEDURE — 36415 COLL VENOUS BLD VENIPUNCTURE: CPT | Performed by: PHYSICIAN ASSISTANT

## 2021-07-29 PROCEDURE — 93005 ELECTROCARDIOGRAM TRACING: CPT

## 2021-07-29 PROCEDURE — 84484 ASSAY OF TROPONIN QUANT: CPT | Performed by: PHYSICIAN ASSISTANT

## 2021-07-29 PROCEDURE — 80053 COMPREHEN METABOLIC PANEL: CPT | Performed by: PHYSICIAN ASSISTANT

## 2021-07-29 PROCEDURE — 85025 COMPLETE CBC W/AUTO DIFF WBC: CPT | Performed by: PHYSICIAN ASSISTANT

## 2021-07-29 PROCEDURE — 83690 ASSAY OF LIPASE: CPT | Performed by: PHYSICIAN ASSISTANT

## 2021-07-29 PROCEDURE — 81000 URINALYSIS NONAUTO W/SCOPE: CPT | Performed by: PHYSICIAN ASSISTANT

## 2021-07-29 PROCEDURE — 99285 EMERGENCY DEPT VISIT HI MDM: CPT

## 2021-07-29 RX ORDER — ONDANSETRON 4 MG/1
4 TABLET, ORALLY DISINTEGRATING ORAL EVERY 6 HOURS PRN
Qty: 10 TABLET | Refills: 0 | Status: SHIPPED | OUTPATIENT
Start: 2021-07-29 | End: 2022-11-29

## 2021-07-29 RX ORDER — TAMSULOSIN HYDROCHLORIDE 0.4 MG/1
CAPSULE ORAL
Qty: 10 CAPSULE | Refills: 0 | Status: SHIPPED | OUTPATIENT
Start: 2021-07-29 | End: 2022-11-29

## 2021-07-29 RX ORDER — DOCUSATE SODIUM 100 MG/1
100 CAPSULE, LIQUID FILLED ORAL 3 TIMES DAILY PRN
Qty: 15 CAPSULE | Refills: 0 | Status: SHIPPED | OUTPATIENT
Start: 2021-07-29 | End: 2022-11-29

## 2021-08-04 ENCOUNTER — TELEPHONE (OUTPATIENT)
Dept: INTERNAL MEDICINE | Facility: CLINIC | Age: 61
End: 2021-08-04

## 2021-08-04 DIAGNOSIS — N20.0 CALCULUS OF KIDNEY: Primary | ICD-10-CM

## 2021-08-06 ENCOUNTER — HOSPITAL ENCOUNTER (OUTPATIENT)
Dept: RADIOLOGY | Facility: OTHER | Age: 61
Discharge: HOME | End: 2021-08-06
Attending: INTERNAL MEDICINE
Payer: COMMERCIAL

## 2021-08-06 PROCEDURE — 76536 US EXAM OF HEAD AND NECK: CPT | Mod: 26,,, | Performed by: RADIOLOGY

## 2021-08-06 PROCEDURE — 76536 US EXAM OF HEAD AND NECK: CPT | Mod: TC

## 2021-08-06 PROCEDURE — 76536 US SOFT TISSUE HEAD NECK THYROID: ICD-10-PCS | Mod: 26,,, | Performed by: RADIOLOGY

## 2021-08-09 ENCOUNTER — PATIENT MESSAGE (OUTPATIENT)
Dept: ADMINISTRATIVE | Facility: OTHER | Age: 61
End: 2021-08-09

## 2021-08-09 ENCOUNTER — TELEPHONE (OUTPATIENT)
Dept: INTERNAL MEDICINE | Facility: CLINIC | Age: 61
End: 2021-08-09

## 2021-08-11 ENCOUNTER — OFFICE VISIT (OUTPATIENT)
Dept: UROLOGY | Facility: CLINIC | Age: 61
End: 2021-08-11
Payer: COMMERCIAL

## 2021-08-11 VITALS
DIASTOLIC BLOOD PRESSURE: 86 MMHG | WEIGHT: 136 LBS | BODY MASS INDEX: 21.86 KG/M2 | SYSTOLIC BLOOD PRESSURE: 143 MMHG | HEIGHT: 66 IN | HEART RATE: 76 BPM

## 2021-08-11 DIAGNOSIS — N20.0 NEPHROLITHIASIS: Primary | ICD-10-CM

## 2021-08-11 DIAGNOSIS — N20.0 CALCULUS OF KIDNEY: ICD-10-CM

## 2021-08-11 DIAGNOSIS — N28.1 SIMPLE RENAL CYST: ICD-10-CM

## 2021-08-11 PROCEDURE — 1126F AMNT PAIN NOTED NONE PRSNT: CPT | Mod: CPTII,S$GLB,, | Performed by: NURSE PRACTITIONER

## 2021-08-11 PROCEDURE — 99203 OFFICE O/P NEW LOW 30 MIN: CPT | Mod: S$GLB,,, | Performed by: NURSE PRACTITIONER

## 2021-08-11 PROCEDURE — 3008F PR BODY MASS INDEX (BMI) DOCUMENTED: ICD-10-PCS | Mod: CPTII,S$GLB,, | Performed by: NURSE PRACTITIONER

## 2021-08-11 PROCEDURE — 1159F MED LIST DOCD IN RCRD: CPT | Mod: CPTII,S$GLB,, | Performed by: NURSE PRACTITIONER

## 2021-08-11 PROCEDURE — 3008F BODY MASS INDEX DOCD: CPT | Mod: CPTII,S$GLB,, | Performed by: NURSE PRACTITIONER

## 2021-08-11 PROCEDURE — 99203 PR OFFICE/OUTPT VISIT, NEW, LEVL III, 30-44 MIN: ICD-10-PCS | Mod: S$GLB,,, | Performed by: NURSE PRACTITIONER

## 2021-08-11 PROCEDURE — 3079F PR MOST RECENT DIASTOLIC BLOOD PRESSURE 80-89 MM HG: ICD-10-PCS | Mod: CPTII,S$GLB,, | Performed by: NURSE PRACTITIONER

## 2021-08-11 PROCEDURE — 1126F PR PAIN SEVERITY QUANTIFIED, NO PAIN PRESENT: ICD-10-PCS | Mod: CPTII,S$GLB,, | Performed by: NURSE PRACTITIONER

## 2021-08-11 PROCEDURE — 3079F DIAST BP 80-89 MM HG: CPT | Mod: CPTII,S$GLB,, | Performed by: NURSE PRACTITIONER

## 2021-08-11 PROCEDURE — 3077F SYST BP >= 140 MM HG: CPT | Mod: CPTII,S$GLB,, | Performed by: NURSE PRACTITIONER

## 2021-08-11 PROCEDURE — 1159F PR MEDICATION LIST DOCUMENTED IN MEDICAL RECORD: ICD-10-PCS | Mod: CPTII,S$GLB,, | Performed by: NURSE PRACTITIONER

## 2021-08-11 PROCEDURE — 3077F PR MOST RECENT SYSTOLIC BLOOD PRESSURE >= 140 MM HG: ICD-10-PCS | Mod: CPTII,S$GLB,, | Performed by: NURSE PRACTITIONER

## 2021-08-11 RX ORDER — KETOROLAC TROMETHAMINE 10 MG/1
10 TABLET, FILM COATED ORAL EVERY 6 HOURS
Qty: 20 TABLET | Refills: 0 | Status: SHIPPED | OUTPATIENT
Start: 2021-08-11 | End: 2021-08-16

## 2021-08-16 ENCOUNTER — PATIENT OUTREACH (OUTPATIENT)
Dept: ADMINISTRATIVE | Facility: OTHER | Age: 61
End: 2021-08-16

## 2021-08-17 ENCOUNTER — HOSPITAL ENCOUNTER (OUTPATIENT)
Dept: RADIOLOGY | Facility: OTHER | Age: 61
Discharge: HOME | End: 2021-08-17
Attending: NURSE PRACTITIONER
Payer: COMMERCIAL

## 2021-08-17 DIAGNOSIS — N20.0 NEPHROLITHIASIS: ICD-10-CM

## 2021-08-17 DIAGNOSIS — N20.0 NEPHROLITHIASIS: Primary | ICD-10-CM

## 2021-08-17 PROCEDURE — 76770 US EXAM ABDO BACK WALL COMP: CPT | Mod: TC

## 2021-08-17 PROCEDURE — 76770 US EXAM ABDO BACK WALL COMP: CPT | Mod: 26,,, | Performed by: RADIOLOGY

## 2021-08-17 PROCEDURE — 76770 US RETROPERITONEAL COMPLETE: ICD-10-PCS | Mod: 26,,, | Performed by: RADIOLOGY

## 2021-08-19 ENCOUNTER — OFFICE VISIT (OUTPATIENT)
Dept: SURGERY | Facility: CLINIC | Age: 61
End: 2021-08-19
Payer: COMMERCIAL

## 2021-08-19 VITALS
OXYGEN SATURATION: 97 % | BODY MASS INDEX: 21.85 KG/M2 | HEART RATE: 86 BPM | SYSTOLIC BLOOD PRESSURE: 130 MMHG | WEIGHT: 135.94 LBS | RESPIRATION RATE: 18 BRPM | DIASTOLIC BLOOD PRESSURE: 85 MMHG | HEIGHT: 66 IN

## 2021-08-19 DIAGNOSIS — E04.9 GOITER: ICD-10-CM

## 2021-08-19 PROCEDURE — 1126F AMNT PAIN NOTED NONE PRSNT: CPT | Mod: CPTII,S$GLB,, | Performed by: SURGERY

## 2021-08-19 PROCEDURE — 3075F SYST BP GE 130 - 139MM HG: CPT | Mod: CPTII,S$GLB,, | Performed by: SURGERY

## 2021-08-19 PROCEDURE — 99213 PR OFFICE/OUTPT VISIT, EST, LEVL III, 20-29 MIN: ICD-10-PCS | Mod: S$GLB,,, | Performed by: SURGERY

## 2021-08-19 PROCEDURE — 99213 OFFICE O/P EST LOW 20 MIN: CPT | Mod: S$GLB,,, | Performed by: SURGERY

## 2021-08-19 PROCEDURE — 1159F PR MEDICATION LIST DOCUMENTED IN MEDICAL RECORD: ICD-10-PCS | Mod: CPTII,S$GLB,, | Performed by: SURGERY

## 2021-08-19 PROCEDURE — 99999 PR PBB SHADOW E&M-EST. PATIENT-LVL IV: CPT | Mod: PBBFAC,,, | Performed by: SURGERY

## 2021-08-19 PROCEDURE — 99999 PR PBB SHADOW E&M-EST. PATIENT-LVL IV: ICD-10-PCS | Mod: PBBFAC,,, | Performed by: SURGERY

## 2021-08-19 PROCEDURE — 3079F DIAST BP 80-89 MM HG: CPT | Mod: CPTII,S$GLB,, | Performed by: SURGERY

## 2021-08-19 PROCEDURE — 3008F BODY MASS INDEX DOCD: CPT | Mod: CPTII,S$GLB,, | Performed by: SURGERY

## 2021-08-19 PROCEDURE — 1159F MED LIST DOCD IN RCRD: CPT | Mod: CPTII,S$GLB,, | Performed by: SURGERY

## 2021-08-19 PROCEDURE — 3008F PR BODY MASS INDEX (BMI) DOCUMENTED: ICD-10-PCS | Mod: CPTII,S$GLB,, | Performed by: SURGERY

## 2021-08-19 PROCEDURE — 1126F PR PAIN SEVERITY QUANTIFIED, NO PAIN PRESENT: ICD-10-PCS | Mod: CPTII,S$GLB,, | Performed by: SURGERY

## 2021-08-19 PROCEDURE — 1160F RVW MEDS BY RX/DR IN RCRD: CPT | Mod: CPTII,S$GLB,, | Performed by: SURGERY

## 2021-08-19 PROCEDURE — 1160F PR REVIEW ALL MEDS BY PRESCRIBER/CLIN PHARMACIST DOCUMENTED: ICD-10-PCS | Mod: CPTII,S$GLB,, | Performed by: SURGERY

## 2021-08-19 PROCEDURE — 3075F PR MOST RECENT SYSTOLIC BLOOD PRESS GE 130-139MM HG: ICD-10-PCS | Mod: CPTII,S$GLB,, | Performed by: SURGERY

## 2021-08-19 PROCEDURE — 3079F PR MOST RECENT DIASTOLIC BLOOD PRESSURE 80-89 MM HG: ICD-10-PCS | Mod: CPTII,S$GLB,, | Performed by: SURGERY

## 2021-10-01 ENCOUNTER — TELEPHONE (OUTPATIENT)
Dept: INTERNAL MEDICINE | Facility: CLINIC | Age: 61
End: 2021-10-01

## 2021-10-05 RX ORDER — ATORVASTATIN CALCIUM 20 MG/1
TABLET, FILM COATED ORAL
Qty: 90 TABLET | Refills: 3 | Status: SHIPPED | OUTPATIENT
Start: 2021-10-05 | End: 2022-08-29

## 2021-10-14 ENCOUNTER — TELEPHONE (OUTPATIENT)
Dept: INTERNAL MEDICINE | Facility: CLINIC | Age: 61
End: 2021-10-14

## 2022-01-11 ENCOUNTER — LAB VISIT (OUTPATIENT)
Dept: LAB | Facility: HOSPITAL | Age: 62
End: 2022-01-11
Payer: COMMERCIAL

## 2022-01-11 DIAGNOSIS — I10 ESSENTIAL HYPERTENSION: ICD-10-CM

## 2022-01-11 LAB
ANION GAP SERPL CALC-SCNC: 9 MMOL/L (ref 8–16)
BUN SERPL-MCNC: 14 MG/DL (ref 6–20)
CALCIUM SERPL-MCNC: 10.6 MG/DL (ref 8.7–10.5)
CHLORIDE SERPL-SCNC: 100 MMOL/L (ref 95–110)
CO2 SERPL-SCNC: 32 MMOL/L (ref 23–29)
CREAT SERPL-MCNC: 0.8 MG/DL (ref 0.5–1.4)
EST. GFR  (AFRICAN AMERICAN): >60 ML/MIN/1.73 M^2
EST. GFR  (NON AFRICAN AMERICAN): >60 ML/MIN/1.73 M^2
GLUCOSE SERPL-MCNC: 86 MG/DL (ref 70–110)
POTASSIUM SERPL-SCNC: 3.3 MMOL/L (ref 3.5–5.1)
SODIUM SERPL-SCNC: 141 MMOL/L (ref 136–145)

## 2022-01-11 PROCEDURE — 80048 BASIC METABOLIC PNL TOTAL CA: CPT | Performed by: INTERNAL MEDICINE

## 2022-01-11 PROCEDURE — 36415 COLL VENOUS BLD VENIPUNCTURE: CPT | Performed by: INTERNAL MEDICINE

## 2022-01-17 ENCOUNTER — PATIENT MESSAGE (OUTPATIENT)
Dept: INTERNAL MEDICINE | Facility: CLINIC | Age: 62
End: 2022-01-17
Payer: COMMERCIAL

## 2022-01-17 DIAGNOSIS — E87.6 HYPOKALEMIA: Primary | ICD-10-CM

## 2022-01-18 PROBLEM — E87.6 HYPOKALEMIA: Status: ACTIVE | Noted: 2022-01-18

## 2022-01-18 RX ORDER — POTASSIUM CHLORIDE 20 MEQ/1
20 TABLET, EXTENDED RELEASE ORAL DAILY
Qty: 30 TABLET | Refills: 6 | Status: SHIPPED | OUTPATIENT
Start: 2022-01-18 | End: 2022-08-15

## 2022-02-10 ENCOUNTER — TELEPHONE (OUTPATIENT)
Dept: INTERNAL MEDICINE | Facility: CLINIC | Age: 62
End: 2022-02-10
Payer: COMMERCIAL

## 2022-02-10 DIAGNOSIS — Z80.0 FAMILY HISTORY OF COLON CANCER: Primary | ICD-10-CM

## 2022-02-10 NOTE — TELEPHONE ENCOUNTER
Please call    The patient was supposed to have a colonoscopy.  This was ordered but never completed and the order has .  Is the patient willing to have a colonoscopy?  If so, please let me know.     She has a family history of colon cancer so she rally needs this.  Thanks

## 2022-02-10 NOTE — TELEPHONE ENCOUNTER
"----- Message from Lily Montero RN sent at 2/10/2022  4:40 PM CST -----  Regarding: Cancellation of Order # [id number]  Order number [id number] for the procedure CASE REQUEST ENDOSCOPY   [GI5] has been canceled by Lily Montero RN [[id number]]. This   procedure was ordered by Lizzy Joya MD [[id number]] on Jun 24, 2021 for the patient Latia Posada [[id number]]. The reason for   cancellation was "None".  "

## 2022-02-11 NOTE — TELEPHONE ENCOUNTER
Called and spoke to pt. She is in agreement for test. Please put in referral so we can schedule. Thank you!

## 2022-02-15 ENCOUNTER — PATIENT MESSAGE (OUTPATIENT)
Dept: ENDOSCOPY | Facility: HOSPITAL | Age: 62
End: 2022-02-15
Payer: COMMERCIAL

## 2022-02-15 DIAGNOSIS — Z12.11 SPECIAL SCREENING FOR MALIGNANT NEOPLASMS, COLON: Primary | ICD-10-CM

## 2022-02-15 DIAGNOSIS — Z01.818 PRE-OP TESTING: ICD-10-CM

## 2022-02-15 RX ORDER — SODIUM, POTASSIUM,MAG SULFATES 17.5-3.13G
1 SOLUTION, RECONSTITUTED, ORAL ORAL DAILY
Qty: 1 KIT | Refills: 0 | Status: SHIPPED | OUTPATIENT
Start: 2022-02-15 | End: 2022-02-17

## 2022-02-18 ENCOUNTER — LAB VISIT (OUTPATIENT)
Dept: LAB | Facility: HOSPITAL | Age: 62
End: 2022-02-18
Attending: INTERNAL MEDICINE
Payer: COMMERCIAL

## 2022-02-18 ENCOUNTER — PATIENT MESSAGE (OUTPATIENT)
Dept: INTERNAL MEDICINE | Facility: CLINIC | Age: 62
End: 2022-02-18
Payer: COMMERCIAL

## 2022-02-18 DIAGNOSIS — I10 ESSENTIAL HYPERTENSION: ICD-10-CM

## 2022-02-18 DIAGNOSIS — E87.6 HYPOKALEMIA: ICD-10-CM

## 2022-02-18 LAB
ALBUMIN SERPL BCP-MCNC: 4.3 G/DL (ref 3.5–5.2)
ANION GAP SERPL CALC-SCNC: 12 MMOL/L (ref 8–16)
BUN SERPL-MCNC: 14 MG/DL (ref 6–20)
CALCIUM SERPL-MCNC: 10.3 MG/DL (ref 8.7–10.5)
CHLORIDE SERPL-SCNC: 102 MMOL/L (ref 95–110)
CO2 SERPL-SCNC: 30 MMOL/L (ref 23–29)
CREAT SERPL-MCNC: 0.8 MG/DL (ref 0.5–1.4)
EST. GFR  (AFRICAN AMERICAN): >60 ML/MIN/1.73 M^2
EST. GFR  (NON AFRICAN AMERICAN): >60 ML/MIN/1.73 M^2
GLUCOSE SERPL-MCNC: 108 MG/DL (ref 70–110)
PHOSPHATE SERPL-MCNC: 3.8 MG/DL (ref 2.7–4.5)
POTASSIUM SERPL-SCNC: 3.8 MMOL/L (ref 3.5–5.1)
SODIUM SERPL-SCNC: 144 MMOL/L (ref 136–145)

## 2022-02-18 PROCEDURE — 80069 RENAL FUNCTION PANEL: CPT | Performed by: INTERNAL MEDICINE

## 2022-02-18 PROCEDURE — 36415 COLL VENOUS BLD VENIPUNCTURE: CPT | Performed by: INTERNAL MEDICINE

## 2022-02-19 NOTE — TELEPHONE ENCOUNTER
Care Due:                  Date            Visit Type   Department     Provider  --------------------------------------------------------------------------------                                MYCHART                              FOLLOWUP/OF  [facility] INTERNAL  Last Visit: 07-      FICE VISIT   MEDICINE       Lizzy Joya  Next Visit: None Scheduled  None         None Found                                                            Last  Test          Frequency    Reason                     Performed    Due Date  --------------------------------------------------------------------------------    Lipid Panel.  12 months..  atorvastatin.............  12- 12-    Powered by [employer]. Reference number: [id number].   2/19/2022 9:17:17 AM CST

## 2022-02-21 RX ORDER — OLMESARTAN MEDOXOMIL 40 MG/1
40 TABLET ORAL DAILY
Qty: 90 TABLET | Refills: 1 | Status: SHIPPED | OUTPATIENT
Start: 2022-02-21 | End: 2022-05-16

## 2022-02-21 NOTE — TELEPHONE ENCOUNTER
Refill Routing Note   Medication(s) are not appropriate for processing by [facility] for the following reason(s):      - Medication requested has undergone a recent dosage adjustment (<3 months)    [facility] action(s):  Defer Medication-related problems identified:   Dose adjustment  Requires labs     Medication Therapy Plan: Labs(lipid panel)  --->Care Gap information included in message below if applicable.   Medication reconciliation completed: No   Automatic Epic Generated Protocol Data:        Requested Prescriptions   Pending Prescriptions Disp Refills    olmesartan (BENICAR) 40 MG tablet 90 tablet 1     Sig: Take 1 tablet (40 mg total) by mouth once daily.       Cardiovascular:  Angiotensin Receptor Blockers Passed - 2/19/2022  9:16 AM        Passed - Patient is at least 18 years old        Passed - Last BP in normal range within 360 days     BP Readings from Last 1 Encounters:   08/19/21 130/85               Passed - Valid encounter within last 15 months     Recent Visits  Date Type Provider Dept   07/28/21 Office Visit Lizzy Joya MD [facility]   12/15/20 Office Visit Lizzy Joya MD [facility]   Showing recent visits within past 720 days and meeting all other requirements  Future Appointments  No visits were found meeting these conditions.  Showing future appointments within next 150 days and meeting all other requirements      Future Appointments              In 1 month COVID TESTING, [facility] PRE-AD[facility]                Passed - Cr is 1.39 or below and within 360 days     Lab Results   Component Value Date    CREATININE 0.8 02/18/2022    CREATININE 0.8 01/11/2022    CREATININE 0.8 07/29/2021              Passed - K is 5.2 or below and within 360 days     Potassium   Date Value Ref Range Status   02/18/2022 3.8 3.5 - 5.1 mmol/L Final   01/11/2022 3.3 (L) 3.5 - 5.1 mmol/L Final   07/29/2021 3.5 3.5 - 5.1 mmol/L Final               Passed - eGFR within 360 days     Lab Results   Component Value Date    EGFRNONAA >60.0 02/18/2022    EGFRNONAA >60.0 01/11/2022    EGFRNONAA >60 07/29/2021                      Appointments  past 12m or future 3m with PCP    Date Provider   Last Visit   7/28/2021 Lizzy Joya MD   Next Visit   2/19/2022 Lizzy Joya MD   ED visits in past 90 days: 0        Note composed:10:59 AM 02/21/2022

## 2022-04-01 ENCOUNTER — HOSPITAL ENCOUNTER (OUTPATIENT)
Facility: HOSPITAL | Age: 62
Discharge: HOME | End: 2022-04-01
Attending: COLON & RECTAL SURGERY | Admitting: COLON & RECTAL SURGERY
Payer: COMMERCIAL

## 2022-04-01 ENCOUNTER — ANESTHESIA (OUTPATIENT)
Dept: ENDOSCOPY | Facility: HOSPITAL | Age: 62
End: 2022-04-01
Payer: COMMERCIAL

## 2022-04-01 ENCOUNTER — ANESTHESIA EVENT (OUTPATIENT)
Dept: ENDOSCOPY | Facility: HOSPITAL | Age: 62
End: 2022-04-01
Payer: COMMERCIAL

## 2022-04-01 VITALS
TEMPERATURE: 98 F | OXYGEN SATURATION: 97 % | HEART RATE: 57 BPM | RESPIRATION RATE: 20 BRPM | HEIGHT: 66 IN | WEIGHT: 140 LBS | SYSTOLIC BLOOD PRESSURE: 101 MMHG | DIASTOLIC BLOOD PRESSURE: 63 MMHG | BODY MASS INDEX: 22.5 KG/M2

## 2022-04-01 DIAGNOSIS — Z12.11 SCREENING FOR COLON CANCER: ICD-10-CM

## 2022-04-01 PROCEDURE — 37000008 HC ANESTHESIA 1ST 15 MINUTES: Performed by: COLON & RECTAL SURGERY

## 2022-04-01 PROCEDURE — 25000003 PHARM REV CODE 250: Performed by: COLON & RECTAL SURGERY

## 2022-04-01 PROCEDURE — 63600175 PHARM REV CODE 636 W HCPCS: Performed by: NURSE ANESTHETIST, CERTIFIED REGISTERED

## 2022-04-01 PROCEDURE — E9220 PRA ENDO ANESTHESIA: ICD-10-PCS | Mod: ,,, | Performed by: NURSE ANESTHETIST, CERTIFIED REGISTERED

## 2022-04-01 PROCEDURE — 37000009 HC ANESTHESIA EA ADD 15 MINS: Performed by: COLON & RECTAL SURGERY

## 2022-04-01 PROCEDURE — G0121 COLON CA SCRN NOT HI RSK IND: ICD-10-PCS | Mod: ,,, | Performed by: COLON & RECTAL SURGERY

## 2022-04-01 PROCEDURE — G0121 COLON CA SCRN NOT HI RSK IND: HCPCS | Mod: ,,, | Performed by: COLON & RECTAL SURGERY

## 2022-04-01 PROCEDURE — 25000003 PHARM REV CODE 250: Performed by: NURSE ANESTHETIST, CERTIFIED REGISTERED

## 2022-04-01 PROCEDURE — E9220 PRA ENDO ANESTHESIA: HCPCS | Mod: ,,, | Performed by: NURSE ANESTHETIST, CERTIFIED REGISTERED

## 2022-04-01 PROCEDURE — G0121 COLON CA SCRN NOT HI RSK IND: HCPCS | Performed by: COLON & RECTAL SURGERY

## 2022-04-01 RX ORDER — PHENYLEPHRINE HYDROCHLORIDE 10 MG/ML
INJECTION INTRAVENOUS
Status: DISCONTINUED | OUTPATIENT
Start: 2022-04-01 | End: 2022-04-01

## 2022-04-01 RX ORDER — LIDOCAINE HYDROCHLORIDE 20 MG/ML
INJECTION INTRAVENOUS
Status: DISCONTINUED | OUTPATIENT
Start: 2022-04-01 | End: 2022-04-01

## 2022-04-01 RX ORDER — SODIUM CHLORIDE 9 MG/ML
INJECTION, SOLUTION INTRAVENOUS CONTINUOUS
Status: DISCONTINUED | OUTPATIENT
Start: 2022-04-01 | End: 2022-04-01 | Stop reason: HOSPADM

## 2022-04-01 RX ORDER — PROPOFOL 10 MG/ML
VIAL (ML) INTRAVENOUS
Status: DISCONTINUED | OUTPATIENT
Start: 2022-04-01 | End: 2022-04-01

## 2022-04-01 RX ORDER — PROPOFOL 10 MG/ML
VIAL (ML) INTRAVENOUS CONTINUOUS PRN
Status: DISCONTINUED | OUTPATIENT
Start: 2022-04-01 | End: 2022-04-01

## 2022-04-01 RX ORDER — SODIUM CHLORIDE 0.9 % (FLUSH) 0.9 %
10 SYRINGE (ML) INJECTION
Status: DISCONTINUED | OUTPATIENT
Start: 2022-04-01 | End: 2022-04-01 | Stop reason: HOSPADM

## 2022-04-01 RX ADMIN — PHENYLEPHRINE HYDROCHLORIDE 100 MCG: 10 INJECTION INTRAVENOUS at 12:04

## 2022-04-01 RX ADMIN — Medication 200 MCG/KG/MIN: at 12:04

## 2022-04-01 RX ADMIN — PROPOFOL 30 MG: 10 INJECTION, EMULSION INTRAVENOUS at 12:04

## 2022-04-01 RX ADMIN — LIDOCAINE HYDROCHLORIDE 100 MG: 20 INJECTION, SOLUTION INTRAVENOUS at 12:04

## 2022-04-01 RX ADMIN — PROPOFOL 50 MG: 10 INJECTION, EMULSION INTRAVENOUS at 12:04

## 2022-04-01 RX ADMIN — SODIUM CHLORIDE: 0.9 INJECTION, SOLUTION INTRAVENOUS at 10:04

## 2022-04-01 NOTE — ANESTHESIA POSTPROCEDURE EVALUATION
Anesthesia Post Evaluation    Patient: Latia Posada    Procedure(s) Performed: Procedure(s) (LRB):  COLONOSCOPY (N/A)    Final Anesthesia Type: general      Patient location during evaluation: GI PACU  Patient participation: Yes- Able to Participate  Level of consciousness: awake and alert and oriented  Post-procedure vital signs: reviewed and stable  Pain management: adequate  Airway patency: patent    PONV status at discharge: No PONV  Anesthetic complications: no      Cardiovascular status: stable  Respiratory status: unassisted, spontaneous ventilation and room air  Hydration status: euvolemic  Follow-up not needed.          Vitals Value Taken Time   BP 83/52 04/01/22 1252        Pulse 80 04/01/22 1252   Resp 16 04/01/22 1252   SpO2 99 % 04/01/22 1252         No case tracking events are documented in the log.      Pain/Ching Score: Ching Score: 10 (4/1/2022 12:52 PM)

## 2022-04-01 NOTE — ANESTHESIA PREPROCEDURE EVALUATION
04/01/2022  Latia Posada is a 61 y.o., female.   Past Surgical History:   Procedure Laterality Date    BREAST CYST EXCISION Right     CHOLECYSTECTOMY      DENTAL SURGERY      DILATION AND CURETTAGE OF UTERUS      HYSTERECTOMY  age 42    RONALD (fibroids, AUB), RSO (cyst) [place]    PARATHYROIDECTOMY N/A 10/18/2019    Procedure: PARATHYROIDECTOMY;  Surgeon: Myesha Chaney MD;  Location: [facility];  Service: General;  Laterality: N/A;     Past Medical History:   Diagnosis Date    Chronic kidney disease     Family history of colon cancer 12/4/2012    General anesthetics causing adverse effect in therapeutic use     Hypercalcemia 8/7/2018    Hyperlipidemia     Hypertension     Kidney stones 12/4/2012    Menopause syndrome 12/4/2012    Thyroid nodule 12/4/2012    Vitamin D deficiency disease 2/3/2014           Pre-op Assessment    I have reviewed the Patient Summary Reports.       I have reviewed the Medications.     Review of Systems  Anesthesia Hx:  Denies Family Hx of Anesthesia complications.   Denies Personal Hx of Anesthesia complications.   Hematology/Oncology:  Hematology Normal   Oncology Normal     EENT/Dental:EENT/Dental Normal   Cardiovascular:  Cardiovascular Normal     Pulmonary:  Pulmonary Normal    Renal/:  Renal/ Normal     Hepatic/GI:  Hepatic/GI Normal    Musculoskeletal:  Musculoskeletal Normal    Neurological:  Neurology Normal    Endocrine:  Endocrine Normal    Dermatological:  Skin Normal    Psych:  Psychiatric Normal           Physical Exam  General: Well nourished, Cooperative, Alert and Oriented    Airway:  Mallampati: II / I  Mouth Opening: Normal  Tongue: Normal  Neck ROM: Normal ROM    Dental:  Intact        Anesthesia Plan  Type of Anesthesia, risks & benefits discussed:    Anesthesia Type: Gen Natural Airway  Intra-op Monitoring Plan: Standard ASA Monitors  Post Op  Pain Control Plan: multimodal analgesia and IV/PO Opioids PRN  Induction:  IV  Informed Consent: Patient consented to blood products? Yes  ASA Score: 2  Day of Surgery Review of History & Physical: H&P Update referred to the surgeon/provider.    Ready For Surgery From Anesthesia Perspective.     .

## 2022-04-01 NOTE — TRANSFER OF CARE
"Anesthesia Transfer of Care Note    Patient: Latia Posada    Procedure(s) Performed: Procedure(s) (LRB):  COLONOSCOPY (N/A)    Patient location: GI    Anesthesia Type: general    Transport from OR: Transported from OR on room air with adequate spontaneous ventilation    Post pain: adequate analgesia    Post assessment: no apparent anesthetic complications and tolerated procedure well    Post vital signs: stable    Level of consciousness: sedated and responds to stimulation    Nausea/Vomiting: no nausea/vomiting    Complications: none    Transfer of care protocol was followed      Last vitals:   Visit Vitals  /66 (BP Location: Left arm, Patient Position: Lying)   Pulse 94   Temp 36.5 °C (97.7 °F) (Temporal)   Resp 16   Ht 5' 6" (1.676 m)   Wt 63.5 kg (140 lb)   SpO2 97%   Breastfeeding No   BMI 22.60 kg/m²     "

## 2022-04-01 NOTE — PROVATION PATIENT INSTRUCTIONS
Discharge Summary/Instructions after an Endoscopic Procedure  Patient Name: Latia Posada  Patient MRN: [id number]  Patient YOB: 1960 Friday, April 1, 2022  Karson Goodman MD  Dear patient,  As a result of recent federal legislation (The Federal Cures Act), you may   receive lab or pathology results from your procedure in your [facility]   account before your physician is able to contact you. Your physician or   their representative will relay the results to you with their   recommendations at their soonest availability.  Thank you,  RESTRICTIONS:  During your procedure today, you received medications for sedation.  These   medications may affect your judgment, balance and coordination.  Therefore,   for 24 hours, you have the following restrictions:   - DO NOT drive a car, operate machinery, make legal/financial decisions,   sign important papers or drink alcohol.    ACTIVITY:  Today: no heavy lifting, straining or running due to procedural   sedation/anesthesia.  The following day: return to full activity including work.  DIET:  Eat and drink normally unless instructed otherwise.     TREATMENT FOR COMMON SIDE EFFECTS:  - Mild abdominal pain, nausea, belching, bloating or excessive gas:  rest,   eat lightly and use a heating pad.  - Sore Throat: treat with throat lozenges and/or gargle with warm salt   water.  - Because air was used during the procedure, expelling large amounts of air   from your rectum or belching is normal.  - If a bowel prep was taken, you may not have a bowel movement for 1-3 days.    This is normal.  SYMPTOMS TO WATCH FOR AND REPORT TO YOUR PHYSICIAN:  1. Abdominal pain or bloating, other than gas cramps.  2. Chest pain.  3. Back pain.  4. Signs of infection such as: chills or fever occurring within 24 hours   after the procedure.  5. Rectal bleeding, which would show as bright red, maroon, or black stools.   (A tablespoon of blood from the rectum is not serious, especially if    hemorrhoids are present.)  6. Vomiting.  7. Weakness or dizziness.  GO DIRECTLY TO THE NEAREST EMERGENCY ROOM IF YOU HAVE ANY OF THE FOLLOWING:      Difficulty breathing              Chills and/or fever over 101 F   Persistent vomiting and/or vomiting blood   Severe abdominal pain   Severe chest pain   Black, tarry stools   Bleeding- more than one tablespoon   Any other symptom or condition that you feel may need urgent attention  Your doctor recommends these additional instructions:  If any biopsies were taken, your doctors clinic will contact you in 1 to 2   weeks with any results.  - Discharge patient to home (ambulatory).   - Resume previous diet indefinitely.   - Continue present medications.   - Repeat colonoscopy in 10 years for screening purposes.  For questions, problems or results please call your physician - Karson Goodman MD at Work:  [phone].  [facility], EMERGENCY ROOM PHONE NUMBER: [phone]  IF A COMPLICATION OR EMERGENCY SITUATION ARISES AND YOU ARE UNABLE TO REACH   YOUR PHYSICIAN - GO DIRECTLY TO THE EMERGENCY ROOM.  Karson Goodman MD  4/1/2022 12:37:56 PM  This report has been verified and signed electronically.  Dear patient,  As a result of recent federal legislation (The Federal Cures Act), you may   receive lab or pathology results from your procedure in your [facility]   account before your physician is able to contact you. Your physician or   their representative will relay the results to you with their   recommendations at their soonest availability.  Thank you,  PROVATION

## 2022-05-25 ENCOUNTER — TELEPHONE (OUTPATIENT)
Dept: INTERNAL MEDICINE | Facility: CLINIC | Age: 62
End: 2022-05-25
Payer: COMMERCIAL

## 2022-05-25 DIAGNOSIS — Z00.00 ANNUAL PHYSICAL EXAM: Primary | ICD-10-CM

## 2022-05-25 NOTE — TELEPHONE ENCOUNTER
Called patient to schedule her labs however there was no answer. I asked the patient to call our office tomorrow to schedule-Keshia NELSON MA

## 2022-06-24 ENCOUNTER — HOSPITAL ENCOUNTER (OUTPATIENT)
Dept: RADIOLOGY | Facility: OTHER | Age: 62
Discharge: HOME | End: 2022-06-24
Attending: INTERNAL MEDICINE
Payer: COMMERCIAL

## 2022-06-24 DIAGNOSIS — Z12.31 SCREENING MAMMOGRAM, ENCOUNTER FOR: ICD-10-CM

## 2022-06-24 PROCEDURE — 77067 MAMMO DIGITAL SCREENING BILAT WITH TOMO: ICD-10-PCS | Mod: 26,,, | Performed by: RADIOLOGY

## 2022-06-24 PROCEDURE — 77067 SCR MAMMO BI INCL CAD: CPT | Mod: TC

## 2022-06-24 PROCEDURE — 77063 BREAST TOMOSYNTHESIS BI: CPT | Mod: 26,,, | Performed by: RADIOLOGY

## 2022-06-24 PROCEDURE — 77063 MAMMO DIGITAL SCREENING BILAT WITH TOMO: ICD-10-PCS | Mod: 26,,, | Performed by: RADIOLOGY

## 2022-06-24 PROCEDURE — 77063 BREAST TOMOSYNTHESIS BI: CPT | Mod: TC

## 2022-06-24 PROCEDURE — 77067 SCR MAMMO BI INCL CAD: CPT | Mod: 26,,, | Performed by: RADIOLOGY

## 2022-07-21 ENCOUNTER — OFFICE VISIT (OUTPATIENT)
Dept: OPTOMETRY | Facility: CLINIC | Age: 62
End: 2022-07-21
Payer: COMMERCIAL

## 2022-07-21 DIAGNOSIS — H52.203 MYOPIA WITH ASTIGMATISM AND PRESBYOPIA, BILATERAL: ICD-10-CM

## 2022-07-21 DIAGNOSIS — H25.13 NUCLEAR SCLEROSIS OF BOTH EYES: Primary | ICD-10-CM

## 2022-07-21 DIAGNOSIS — H52.4 MYOPIA WITH ASTIGMATISM AND PRESBYOPIA, BILATERAL: ICD-10-CM

## 2022-07-21 DIAGNOSIS — H52.13 MYOPIA WITH ASTIGMATISM AND PRESBYOPIA, BILATERAL: ICD-10-CM

## 2022-07-21 PROCEDURE — 92015 DETERMINE REFRACTIVE STATE: CPT | Mod: S$GLB,,, | Performed by: OPTOMETRIST

## 2022-07-21 PROCEDURE — 4010F PR ACE/ARB THEARPY RXD/TAKEN: ICD-10-PCS | Mod: CPTII,S$GLB,, | Performed by: OPTOMETRIST

## 2022-07-21 PROCEDURE — 99999 PR PBB SHADOW E&M-EST. PATIENT-LVL III: ICD-10-PCS | Mod: PBBFAC,,, | Performed by: OPTOMETRIST

## 2022-07-21 PROCEDURE — 1159F PR MEDICATION LIST DOCUMENTED IN MEDICAL RECORD: ICD-10-PCS | Mod: CPTII,S$GLB,, | Performed by: OPTOMETRIST

## 2022-07-21 PROCEDURE — 99999 PR PBB SHADOW E&M-EST. PATIENT-LVL III: CPT | Mod: PBBFAC,,, | Performed by: OPTOMETRIST

## 2022-07-21 PROCEDURE — 4010F ACE/ARB THERAPY RXD/TAKEN: CPT | Mod: CPTII,S$GLB,, | Performed by: OPTOMETRIST

## 2022-07-21 PROCEDURE — 92004 COMPRE OPH EXAM NEW PT 1/>: CPT | Mod: S$GLB,,, | Performed by: OPTOMETRIST

## 2022-07-21 PROCEDURE — 92015 PR REFRACTION: ICD-10-PCS | Mod: S$GLB,,, | Performed by: OPTOMETRIST

## 2022-07-21 PROCEDURE — 1159F MED LIST DOCD IN RCRD: CPT | Mod: CPTII,S$GLB,, | Performed by: OPTOMETRIST

## 2022-07-21 PROCEDURE — 92004 PR EYE EXAM, NEW PATIENT,COMPREHESV: ICD-10-PCS | Mod: S$GLB,,, | Performed by: OPTOMETRIST

## 2022-07-21 NOTE — PROGRESS NOTES
HPI     Last eye exam was 3/8/17 with Dr. Esqueda.  Patient states decrease in overall vision since last exam. Wanted to get   an updated glasses rx. Occasionally gets headaches towards the end of the   day.  Patient denies diplopia, flashes/floaters, and pain.      Last edited by Chantelle Vang MA on 7/21/2022  1:09 PM. (History)            Assessment /Plan     For exam results, see Encounter Report.    Nuclear sclerosis of both eyes    Myopia with astigmatism and presbyopia, bilateral            1.  Educated on cataracts and affects on vision.  Patient happy with vision.  Monitor.  2.  Bifocal rx given.   Retina flat and intact OU--no holes, tears, breaks, or RDs.  Eye health normal OU.

## 2022-08-08 ENCOUNTER — PATIENT MESSAGE (OUTPATIENT)
Dept: UROLOGY | Facility: CLINIC | Age: 62
End: 2022-08-08
Payer: COMMERCIAL

## 2022-08-08 ENCOUNTER — HOSPITAL ENCOUNTER (OUTPATIENT)
Dept: RADIOLOGY | Facility: OTHER | Age: 62
Discharge: HOME | End: 2022-08-08
Attending: NURSE PRACTITIONER
Payer: COMMERCIAL

## 2022-08-08 DIAGNOSIS — N20.0 NEPHROLITHIASIS: ICD-10-CM

## 2022-08-08 DIAGNOSIS — N22 CALCULUS OF URINARY TRACT IN DISEASES CLASSIFIED ELSEWHERE: ICD-10-CM

## 2022-08-08 PROCEDURE — 74018 XR KUB: ICD-10-PCS | Mod: 26,,, | Performed by: RADIOLOGY

## 2022-08-08 PROCEDURE — 76770 US EXAM ABDO BACK WALL COMP: CPT | Mod: 26,,, | Performed by: RADIOLOGY

## 2022-08-08 PROCEDURE — 74018 RADEX ABDOMEN 1 VIEW: CPT | Mod: 26,,, | Performed by: RADIOLOGY

## 2022-08-08 PROCEDURE — 76770 US RETROPERITONEAL COMPLETE: ICD-10-PCS | Mod: 26,,, | Performed by: RADIOLOGY

## 2022-08-08 PROCEDURE — 76770 US EXAM ABDO BACK WALL COMP: CPT | Mod: TC

## 2022-08-08 PROCEDURE — 74018 RADEX ABDOMEN 1 VIEW: CPT | Mod: TC,FY

## 2022-11-29 ENCOUNTER — LAB VISIT (OUTPATIENT)
Dept: LAB | Facility: HOSPITAL | Age: 62
End: 2022-11-29
Attending: INTERNAL MEDICINE
Payer: COMMERCIAL

## 2022-11-29 ENCOUNTER — TELEPHONE (OUTPATIENT)
Dept: OTOLARYNGOLOGY | Facility: CLINIC | Age: 62
End: 2022-11-29
Payer: COMMERCIAL

## 2022-11-29 ENCOUNTER — OFFICE VISIT (OUTPATIENT)
Dept: INTERNAL MEDICINE | Facility: CLINIC | Age: 62
End: 2022-11-29
Payer: COMMERCIAL

## 2022-11-29 ENCOUNTER — IMMUNIZATION (OUTPATIENT)
Dept: INTERNAL MEDICINE | Facility: CLINIC | Age: 62
End: 2022-11-29
Payer: COMMERCIAL

## 2022-11-29 VITALS
HEIGHT: 66 IN | DIASTOLIC BLOOD PRESSURE: 65 MMHG | WEIGHT: 139.75 LBS | SYSTOLIC BLOOD PRESSURE: 118 MMHG | BODY MASS INDEX: 22.46 KG/M2

## 2022-11-29 DIAGNOSIS — Z00.00 ANNUAL PHYSICAL EXAM: Primary | ICD-10-CM

## 2022-11-29 DIAGNOSIS — Z90.89 H/O PARATHYROIDECTOMY: ICD-10-CM

## 2022-11-29 DIAGNOSIS — I10 ESSENTIAL HYPERTENSION: ICD-10-CM

## 2022-11-29 DIAGNOSIS — M85.80 OSTEOPENIA, UNSPECIFIED LOCATION: ICD-10-CM

## 2022-11-29 DIAGNOSIS — E78.2 MIXED HYPERLIPIDEMIA: ICD-10-CM

## 2022-11-29 DIAGNOSIS — Z80.0 FAMILY HISTORY OF COLON CANCER: ICD-10-CM

## 2022-11-29 DIAGNOSIS — N20.0 CALCULUS OF KIDNEY: ICD-10-CM

## 2022-11-29 DIAGNOSIS — N95.1 MENOPAUSE SYNDROME: ICD-10-CM

## 2022-11-29 DIAGNOSIS — E04.1 THYROID NODULE: ICD-10-CM

## 2022-11-29 DIAGNOSIS — Z98.890 H/O PARATHYROIDECTOMY: ICD-10-CM

## 2022-11-29 DIAGNOSIS — E83.52 HYPERCALCEMIA: ICD-10-CM

## 2022-11-29 DIAGNOSIS — G47.33 OSA (OBSTRUCTIVE SLEEP APNEA): ICD-10-CM

## 2022-11-29 DIAGNOSIS — E55.9 VITAMIN D DEFICIENCY DISEASE: ICD-10-CM

## 2022-11-29 PROBLEM — E21.0 PRIMARY HYPERPARATHYROIDISM: Status: RESOLVED | Noted: 2019-07-30 | Resolved: 2022-11-29

## 2022-11-29 LAB
ALBUMIN SERPL BCP-MCNC: 4.4 G/DL (ref 3.5–5.2)
ANION GAP SERPL CALC-SCNC: 10 MMOL/L (ref 8–16)
BUN SERPL-MCNC: 16 MG/DL (ref 8–23)
CALCIUM SERPL-MCNC: 10.5 MG/DL (ref 8.7–10.5)
CHLORIDE SERPL-SCNC: 105 MMOL/L (ref 95–110)
CO2 SERPL-SCNC: 28 MMOL/L (ref 23–29)
CREAT SERPL-MCNC: 0.7 MG/DL (ref 0.5–1.4)
EST. GFR  (NO RACE VARIABLE): >60 ML/MIN/1.73 M^2
GLUCOSE SERPL-MCNC: 98 MG/DL (ref 70–110)
PHOSPHATE SERPL-MCNC: 3.2 MG/DL (ref 2.7–4.5)
POTASSIUM SERPL-SCNC: 3.9 MMOL/L (ref 3.5–5.1)
PTH-INTACT SERPL-MCNC: 38.2 PG/ML (ref 9–77)
SODIUM SERPL-SCNC: 143 MMOL/L (ref 136–145)

## 2022-11-29 PROCEDURE — 99396 PREV VISIT EST AGE 40-64: CPT | Mod: 25,S$GLB,, | Performed by: INTERNAL MEDICINE

## 2022-11-29 PROCEDURE — 90686 IIV4 VACC NO PRSV 0.5 ML IM: CPT | Mod: S$GLB,,, | Performed by: INTERNAL MEDICINE

## 2022-11-29 PROCEDURE — 99396 PR PREVENTIVE VISIT,EST,40-64: ICD-10-PCS | Mod: 25,S$GLB,, | Performed by: INTERNAL MEDICINE

## 2022-11-29 PROCEDURE — 99999 PR PBB SHADOW E&M-EST. PATIENT-LVL V: ICD-10-PCS | Mod: PBBFAC,,, | Performed by: INTERNAL MEDICINE

## 2022-11-29 PROCEDURE — 3074F SYST BP LT 130 MM HG: CPT | Mod: CPTII,S$GLB,, | Performed by: INTERNAL MEDICINE

## 2022-11-29 PROCEDURE — 90686 FLU VACCINE (QUAD) GREATER THAN OR EQUAL TO 3YO PRESERVATIVE FREE IM: ICD-10-PCS | Mod: S$GLB,,, | Performed by: INTERNAL MEDICINE

## 2022-11-29 PROCEDURE — 90471 IMMUNIZATION ADMIN: CPT | Mod: S$GLB,,, | Performed by: INTERNAL MEDICINE

## 2022-11-29 PROCEDURE — 3074F PR MOST RECENT SYSTOLIC BLOOD PRESSURE < 130 MM HG: ICD-10-PCS | Mod: CPTII,S$GLB,, | Performed by: INTERNAL MEDICINE

## 2022-11-29 PROCEDURE — 99999 PR PBB SHADOW E&M-EST. PATIENT-LVL V: CPT | Mod: PBBFAC,,, | Performed by: INTERNAL MEDICINE

## 2022-11-29 PROCEDURE — 90471 FLU VACCINE (QUAD) GREATER THAN OR EQUAL TO 3YO PRESERVATIVE FREE IM: ICD-10-PCS | Mod: S$GLB,,, | Performed by: INTERNAL MEDICINE

## 2022-11-29 PROCEDURE — 3008F BODY MASS INDEX DOCD: CPT | Mod: CPTII,S$GLB,, | Performed by: INTERNAL MEDICINE

## 2022-11-29 PROCEDURE — 80069 RENAL FUNCTION PANEL: CPT | Performed by: INTERNAL MEDICINE

## 2022-11-29 PROCEDURE — 3078F DIAST BP <80 MM HG: CPT | Mod: CPTII,S$GLB,, | Performed by: INTERNAL MEDICINE

## 2022-11-29 PROCEDURE — 3008F PR BODY MASS INDEX (BMI) DOCUMENTED: ICD-10-PCS | Mod: CPTII,S$GLB,, | Performed by: INTERNAL MEDICINE

## 2022-11-29 PROCEDURE — 1159F MED LIST DOCD IN RCRD: CPT | Mod: CPTII,S$GLB,, | Performed by: INTERNAL MEDICINE

## 2022-11-29 PROCEDURE — 4010F ACE/ARB THERAPY RXD/TAKEN: CPT | Mod: CPTII,S$GLB,, | Performed by: INTERNAL MEDICINE

## 2022-11-29 PROCEDURE — 83970 ASSAY OF PARATHORMONE: CPT | Performed by: INTERNAL MEDICINE

## 2022-11-29 PROCEDURE — 4010F PR ACE/ARB THEARPY RXD/TAKEN: ICD-10-PCS | Mod: CPTII,S$GLB,, | Performed by: INTERNAL MEDICINE

## 2022-11-29 PROCEDURE — 36415 COLL VENOUS BLD VENIPUNCTURE: CPT | Performed by: INTERNAL MEDICINE

## 2022-11-29 PROCEDURE — 3078F PR MOST RECENT DIASTOLIC BLOOD PRESSURE < 80 MM HG: ICD-10-PCS | Mod: CPTII,S$GLB,, | Performed by: INTERNAL MEDICINE

## 2022-11-29 PROCEDURE — 1159F PR MEDICATION LIST DOCUMENTED IN MEDICAL RECORD: ICD-10-PCS | Mod: CPTII,S$GLB,, | Performed by: INTERNAL MEDICINE

## 2022-11-29 RX ORDER — CHLORTHALIDONE 25 MG/1
25 TABLET ORAL DAILY
Qty: 30 TABLET | Refills: 2 | Status: SHIPPED | OUTPATIENT
Start: 2022-11-29 | End: 2023-09-16 | Stop reason: SDUPTHER

## 2022-11-29 NOTE — PROGRESS NOTES
Patient ID: Latia Posada is a 62 y.o. female.    Chief Complaint: Annual Exam      Assessment:       1. Annual physical exam    2. Essential hypertension    3. Mixed hyperlipidemia    4. ALLYN (obstructive sleep apnea): mild see sleep study 4/21; seen in sleep clinic 4/21    5. H/O parathyroidectomy: 2019    6. Thyroid nodule: stable 2014, 2017.  Also 8/18, 2019 and 8/21    7. Vitamin D deficiency disease    8. Calculus of kidney    9. Menopause syndrome    10. Osteopenia, unspecified location: see DEXA 2/21    11. Hypercalcemia    12. Family history of colon cancer: father age 60            Plan:         1. Annual physical exam    2. Essential hypertension: stable on regimen; continue digital program  -     chlorthalidone (HYGROTEN) 25 MG Tab; Take 1 tablet (25 mg total) by mouth once daily.  Dispense: 30 tablet; Refill: 2- she sill hold until we get labs today    3. Mixed hyperlipidemia: stable on regimen    4. ALLYN (obstructive sleep apnea): mild see sleep study 4/21; seen in sleep clinic 4/21  -     Ambulatory referral/consult to Sleep Disorders; Future; Expected date: 12/06/2022    5. H/O parathyroidectomy: 2019    6. Thyroid nodule: stable 2014, 2017.  Also 8/18, 2019 and 8/21  -     US Soft Tissue Head Neck Thyroid; Future; Expected date: 11/29/2022  -     Ambulatory referral/consult to General Surgery; Future; Expected date: 12/06/2022  -     Ambulatory referral/consult to ENT; Future; Expected date: 12/06/2022    7. Vitamin D deficiency disease; stable on regimen    8. Calculus of kidney: Urology follow up    9. Menopause syndrome: GYN follow up    10. Osteopenia, unspecified location: see DEXA 2/21  -     DXA Bone Density Spine And Hip; Future; Expected date: 11/29/2022    11. Hypercalcemia  -     Renal Function Panel; Future; Expected date: 11/29/2022  -     PTH, Intact; Future; Expected date: 11/29/2022    12. Family history of colon cancer: father age 60: colonoscopy every 5 years     Flu shot  today  COVID booster scheduled  Shingrix recommended    I will review all studies and determine further tx depending on findings     Subjective:   Annual exam    BP doing well.    Kidney stones, will be due for Urology follow-up soon.    Labs a few months ago hypercalcemia likely related to chlorthalidone.  She has since stopped this for a few days since she ran out.    Due for DEXA scan.    Thyroid enlargement, seen last year surgery, not thought to require surgery.  U/s also stable.  However, she continues to feel uncomfortable with fullness in that area difficulty swallowing.    Renal u/s 8/22  Optometry 7/22  Mammogram 6/22  Colonoscopy 4/22  Surgery 8/21 (goiter- told to f/u prn)  Urology 8/21  Thyroid u/s 8/21  DEXA 2/21    Patient Active Problem List:     Essential hypertension     Menopause syndrome     Thyroid nodule: stable 2014, 2017.  Also 8/18,2019 and 8/21     Mixed hyperlipidemia     Family history of colon cancer: father age 60     Vitamin D deficiency disease     Acquired cyst of kidney: stable on u/s 2017, also 8/22     Calculus of kidney     History of abnormal mammogram     Ganglion cyst of dorsum of left wrist     Ganglion cyst of dorsum of right wrist     ALLYN (obstructive sleep apnea): mild see sleep study 4/21; seen in sleep clinic 4/21     Hypokalemia     H/O parathyroidectomy: 2019     Osteopenia see DEXA 2/21             Review of Systems   Constitutional:  Negative for activity change, appetite change and fatigue.   HENT:  Negative for congestion, hearing loss and sore throat.    Eyes:  Negative for visual disturbance.   Respiratory:  Negative for apnea, cough and shortness of breath.    Cardiovascular:  Negative for chest pain, palpitations and leg swelling.   Gastrointestinal:  Negative for abdominal pain, constipation, diarrhea, nausea and vomiting.   Genitourinary:  Negative for vaginal bleeding.   Musculoskeletal:  Negative for arthralgias and joint swelling.   Skin:  Negative for  rash.   Neurological:  Negative for light-headedness and headaches.   Hematological:  Negative for adenopathy. Does not bruise/bleed easily.   Psychiatric/Behavioral:  Negative for sleep disturbance.        Objective:      Physical Exam  Vitals and nursing note reviewed.   Constitutional:       Appearance: She is well-developed.   HENT:      Head: Normocephalic and atraumatic.      Right Ear: External ear normal.      Left Ear: External ear normal.      Nose: Nose normal.      Mouth/Throat:      Pharynx: No oropharyngeal exudate.   Eyes:      General: No scleral icterus.     Extraocular Movements: Extraocular movements intact.      Conjunctiva/sclera: Conjunctivae normal.   Neck:      Thyroid: Thyromegaly present.      Vascular: No JVD.   Cardiovascular:      Rate and Rhythm: Normal rate and regular rhythm.      Heart sounds: Normal heart sounds. No murmur heard.    No gallop.   Pulmonary:      Effort: Pulmonary effort is normal. No respiratory distress.      Breath sounds: Normal breath sounds. No wheezing.   Abdominal:      General: Bowel sounds are normal. There is no distension.      Palpations: Abdomen is soft. There is no mass.      Tenderness: There is no abdominal tenderness. There is no guarding or rebound.   Musculoskeletal:         General: No tenderness. Normal range of motion.      Cervical back: Normal range of motion and neck supple.   Lymphadenopathy:      Cervical: No cervical adenopathy.   Skin:     General: Skin is warm.      Findings: No erythema or rash.   Neurological:      General: No focal deficit present.      Mental Status: She is alert and oriented to person, place, and time.      Cranial Nerves: No cranial nerve deficit.      Coordination: Coordination normal.   Psychiatric:         Behavior: Behavior normal.         Thought Content: Thought content normal.         Judgment: Judgment normal.           Health Maintenance Due   Topic Date Due    Shingles Vaccine (1 of 2) Never done     COVID-19 Vaccine (4 - Booster for Moderna series) 02/12/2022    Influenza Vaccine (1) 09/01/2022

## 2022-12-06 ENCOUNTER — TELEPHONE (OUTPATIENT)
Dept: INTERNAL MEDICINE | Facility: CLINIC | Age: 62
End: 2022-12-06
Payer: COMMERCIAL

## 2022-12-06 DIAGNOSIS — I10 ESSENTIAL HYPERTENSION: ICD-10-CM

## 2022-12-06 NOTE — TELEPHONE ENCOUNTER
Called patient w/ no answer. Left a message to return call .     Good news, PTH is normal meaning parathyroid function is not any cause for concern.  Calcium level has normalized.  If possible, I would like for you to reduce the diuretic to no more than 3 times a week if at all.  Please let me know if you have any issues with your blood pressure on that dose

## 2022-12-06 NOTE — TELEPHONE ENCOUNTER
Patient did not read [employer] message.  Please call to review information and let me know if they have any questions

## 2022-12-07 RX ORDER — CHLORTHALIDONE 25 MG/1
25 TABLET ORAL DAILY
Qty: 30 TABLET | Refills: 2 | Status: CANCELLED | OUTPATIENT
Start: 2022-12-07

## 2022-12-08 ENCOUNTER — HOSPITAL ENCOUNTER (OUTPATIENT)
Dept: RADIOLOGY | Facility: OTHER | Age: 62
Discharge: HOME | End: 2022-12-08
Attending: INTERNAL MEDICINE
Payer: COMMERCIAL

## 2022-12-08 DIAGNOSIS — E04.1 THYROID NODULE: ICD-10-CM

## 2022-12-08 PROCEDURE — 76536 US SOFT TISSUE HEAD NECK THYROID: ICD-10-PCS | Mod: 26,,, | Performed by: RADIOLOGY

## 2022-12-08 PROCEDURE — 76536 US EXAM OF HEAD AND NECK: CPT | Mod: 26,,, | Performed by: RADIOLOGY

## 2022-12-08 PROCEDURE — 76536 US EXAM OF HEAD AND NECK: CPT | Mod: TC

## 2022-12-13 ENCOUNTER — OFFICE VISIT (OUTPATIENT)
Dept: OTOLARYNGOLOGY | Facility: CLINIC | Age: 62
End: 2022-12-13
Payer: COMMERCIAL

## 2022-12-13 VITALS
BODY MASS INDEX: 22.49 KG/M2 | WEIGHT: 139.31 LBS | DIASTOLIC BLOOD PRESSURE: 79 MMHG | HEART RATE: 65 BPM | SYSTOLIC BLOOD PRESSURE: 118 MMHG

## 2022-12-13 DIAGNOSIS — E04.1 THYROID NODULE: ICD-10-CM

## 2022-12-13 DIAGNOSIS — K21.9 LARYNGOPHARYNGEAL REFLUX: Primary | ICD-10-CM

## 2022-12-13 DIAGNOSIS — R09.A2 GLOBUS SENSATION: ICD-10-CM

## 2022-12-13 PROCEDURE — 31575 PR LARYNGOSCOPY, FLEXIBLE; DIAGNOSTIC: ICD-10-PCS | Mod: S$GLB,,, | Performed by: OTOLARYNGOLOGY

## 2022-12-13 PROCEDURE — 1159F MED LIST DOCD IN RCRD: CPT | Mod: CPTII,S$GLB,, | Performed by: OTOLARYNGOLOGY

## 2022-12-13 PROCEDURE — 99999 PR PBB SHADOW E&M-EST. PATIENT-LVL III: CPT | Mod: PBBFAC,,, | Performed by: OTOLARYNGOLOGY

## 2022-12-13 PROCEDURE — 99999 PR PBB SHADOW E&M-EST. PATIENT-LVL III: ICD-10-PCS | Mod: PBBFAC,,, | Performed by: OTOLARYNGOLOGY

## 2022-12-13 PROCEDURE — 3074F SYST BP LT 130 MM HG: CPT | Mod: CPTII,S$GLB,, | Performed by: OTOLARYNGOLOGY

## 2022-12-13 PROCEDURE — 99204 PR OFFICE/OUTPT VISIT, NEW, LEVL IV, 45-59 MIN: ICD-10-PCS | Mod: 25,S$GLB,, | Performed by: OTOLARYNGOLOGY

## 2022-12-13 PROCEDURE — 4010F PR ACE/ARB THEARPY RXD/TAKEN: ICD-10-PCS | Mod: CPTII,S$GLB,, | Performed by: OTOLARYNGOLOGY

## 2022-12-13 PROCEDURE — 31575 DIAGNOSTIC LARYNGOSCOPY: CPT | Mod: S$GLB,,, | Performed by: OTOLARYNGOLOGY

## 2022-12-13 PROCEDURE — 1159F PR MEDICATION LIST DOCUMENTED IN MEDICAL RECORD: ICD-10-PCS | Mod: CPTII,S$GLB,, | Performed by: OTOLARYNGOLOGY

## 2022-12-13 PROCEDURE — 99204 OFFICE O/P NEW MOD 45 MIN: CPT | Mod: 25,S$GLB,, | Performed by: OTOLARYNGOLOGY

## 2022-12-13 PROCEDURE — 3074F PR MOST RECENT SYSTOLIC BLOOD PRESSURE < 130 MM HG: ICD-10-PCS | Mod: CPTII,S$GLB,, | Performed by: OTOLARYNGOLOGY

## 2022-12-13 PROCEDURE — 3078F DIAST BP <80 MM HG: CPT | Mod: CPTII,S$GLB,, | Performed by: OTOLARYNGOLOGY

## 2022-12-13 PROCEDURE — 3008F BODY MASS INDEX DOCD: CPT | Mod: CPTII,S$GLB,, | Performed by: OTOLARYNGOLOGY

## 2022-12-13 PROCEDURE — 3078F PR MOST RECENT DIASTOLIC BLOOD PRESSURE < 80 MM HG: ICD-10-PCS | Mod: CPTII,S$GLB,, | Performed by: OTOLARYNGOLOGY

## 2022-12-13 PROCEDURE — 4010F ACE/ARB THERAPY RXD/TAKEN: CPT | Mod: CPTII,S$GLB,, | Performed by: OTOLARYNGOLOGY

## 2022-12-13 PROCEDURE — 3008F PR BODY MASS INDEX (BMI) DOCUMENTED: ICD-10-PCS | Mod: CPTII,S$GLB,, | Performed by: OTOLARYNGOLOGY

## 2022-12-13 NOTE — PROGRESS NOTES
Chief Complaint   Patient presents with    consult/ thyroid nodule, feels like lump in throat, problem         62 y.o. female presents with longstanding history of feeling that something is in her throat. Feels it more when she is swallowing. She does report some throat irritation secondary to allergies. Also with occasional heartburn. +throat clearing. No voice changes. Also with multiple subcentimeter thyroid nodules, stable.       Review of Systems     Constitutional: Negative for fatigue and unexpected weight change.   HENT: per HPI.  Eyes: Negative for visual disturbance.   Respiratory: Negative for shortness of breath, hemoptysis  Cardiovascular: Negative for chest pain and palpitations.   Genitourinary: Negative for dysuria and difficulty urinating.   Musculoskeletal: Negative for decreased ROM, back pain.   Skin: Negative for rash, sunburn, itching.   Neurological: Negative for dizziness and seizures.   Hematological: Negative for adenopathy. Does not bruise/bleed easily.   Psychiatric/Behavioral: Negative for agitation. The patient is not nervous/anxious.   Endocrine: Negative for rapid weight loss/weight gain, heat/cold intolerance.     Past Medical History:   Diagnosis Date    Cataract     Chronic kidney disease     Family history of colon cancer 12/4/2012    General anesthetics causing adverse effect in therapeutic use     Hypercalcemia 8/7/2018    Hyperlipidemia     Hypertension     Kidney stones 12/4/2012    Menopause syndrome 12/4/2012    Thyroid nodule 12/4/2012    Vitamin D deficiency disease 2/3/2014       Past Surgical History:   Procedure Laterality Date    BREAST CYST EXCISION Right     CHOLECYSTECTOMY      COLONOSCOPY N/A 4/1/2022    Procedure: COLONOSCOPY;  Surgeon: Karson Goodman MD;  Location: [address]);  Service: Endoscopy;  Laterality: N/A;  fully vaccinated-inst portal-tb    DENTAL SURGERY      DILATION AND CURETTAGE OF UTERUS      HYSTERECTOMY  age 42    RONALD (fibroids, AUB),  RSO (cyst) ME    PARATHYROIDECTOMY N/A 10/18/2019    Procedure: PARATHYROIDECTOMY;  Surgeon: Myesha Chaney MD;  Location: [facility];  Service: General;  Laterality: N/A;       family history includes Breast cancer in her paternal grandmother; Cancer in her father and paternal grandmother; Colon cancer in her father; Diabetes in her father; Heart disease in her father and maternal grandfather; Heart disease (age of onset: 45) in her mother; Hypertension in her mother and sister; Kidney disease in her father.    Pt  reports that she has never smoked. She has never used smokeless tobacco. She reports current alcohol use. She reports that she does not use drugs.    Review of patient's allergies indicates:   Allergen Reactions    Lactose      Cramping, diarrhea        Physical Exam    Vitals:    12/13/22 1037   BP: 118/79   Pulse: 65     Body mass index is 22.49 kg/m².    Physical Exam  Vitals and nursing note reviewed.   Constitutional:       General: She is not in acute distress.     Appearance: She is well-developed. She is not diaphoretic.   HENT:      Head: Normocephalic and atraumatic.      Right Ear: Hearing and external ear normal. No decreased hearing noted.      Left Ear: Hearing and external ear normal. No decreased hearing noted.      Nose: Nose normal.      Mouth/Throat:      Mouth: Mucous membranes are moist.      Tongue: No lesions.      Palate: No mass and lesions.      Pharynx: Oropharynx is clear. Uvula midline.   Eyes:      General: Lids are normal.         Right eye: No discharge.         Left eye: No discharge.      Conjunctiva/sclera: Conjunctivae normal.      Pupils: Pupils are equal, round, and reactive to light.   Neck:      Thyroid: No thyroid mass or thyromegaly.      Trachea: Trachea and phonation normal. No tracheal tenderness or tracheal deviation.     Cardiovascular:      Heart sounds: Normal heart sounds.   Pulmonary:      Breath sounds: Normal breath sounds. No stridor.   Abdominal:       Palpations: Abdomen is soft.   Musculoskeletal:      Cervical back: Normal range of motion and neck supple. No edema or erythema.   Lymphadenopathy:      Cervical: No cervical adenopathy.   Skin:     General: Skin is warm and dry.      Coloration: Skin is not pale.      Findings: No erythema or rash.   Neurological:      Mental Status: She is alert and oriented to person, place, and time.      Procedure: Flexible laryngoscopy  In order to fully examine the upper aerodigestive tract, including the larynx, in a patient with a hyperactive gag reflex, flexible endoscopy is required.  After explaining the procedure and obtaining verbal consent, a timeout was performed with the patient's participation according to the universal protocol. Both nasal cavities were anesthetized with 4% Xylocaine spray mixed with Luca-Synephrine. The flexible laryngoscope was inserted into the nasal cavity and advanced to visualize the nasal cavity, nasopharynx, the posterior oropharynx, hypopharynx, and the endolarynx with the above findings noted. The scope was removed and the procedure terminated. The patient tolerated this procedure well without apparent complication.     NP: No lesions of torus or posterior wall  OP: No lesions of posterior wall or BOT. BOT is soft to palpation  Larynx: No lesions of glottic or supraglottic larynx. Normal vocal fold mobility. There is posterior commissure erythema and edema.  HP: No lesions of pyriform sinuses or postcricoid region      Assessment     1. Laryngopharyngeal reflux    2. Thyroid nodule: stable 2014, 2017.  Also 8/18, 2019 and 8/21    3. Globus sensation          Plan  We discussed the symptoms and physical exam findings consistent with laryngopharyngeal reflux. Discussed reflux precautions including not eating late at night and reflux diet. Patient was given a handout with information discussed as well as diet precations. Stable thyroid nodules. No indication for further workup or  imaging.  All questions were answered and she is in agreement with the plan.

## 2022-12-13 NOTE — PATIENT INSTRUCTIONS
ACID REFLUX   What is acid reflux?    When we eat, food passes from the throat and into the stomach through a tube called the esophagus. At the bottom of the esophagus is a ring of muscles that acts as a valve between the esophagus and stomach, called the lower esophageal sphincter. Smoking, alcohol, and certain types of food may weaken the sphincter, so it may stop closing properly. The contents in the stomach then may leak back, or reflux, into the esophagus. This problem is called gastroesophageal reflux disease (GERD). Symptoms of GERD include heartburn, belching, regurgitation of stomach contents, and swallowing difficulties.    Sometimes, the stomach acid travels up through the esophagus and spills into the larynx or pharynx (voice box). This is called laryngopharyngeal reflux (LPR) and is irritating to the vocal folds and surrounding tissues. Often, patients with LPR do not experience heartburn as a symptom. More commonly, symptoms of LPR include hoarseness, excessive mucous resulting in frequent throat clearing, post-nasal drip, coughing, throat soreness or burning, choking episodes, difficulty swallowing, and sensation of a lump in the throat.     How is acid reflux treated?   Treatment for acid reflux can involve any combination of medication, lifestyle modifications, and surgery.   Medications. Your doctor may prescribe a proton pump inhibitor (PPI) or an H2 blocker. If you are prescribed a PPI, take in on an empty stomach in the morning 30 minutes prior to eating breakfast. Keep in mind that it may take 4-6 weeks before symptoms begin to resolve, so do not stop medications without consulting your doctor.   Lifestyle and dietary modifications. Eat smaller meals at a slower pace. Avoid over-eating. If you are overweight, try to lose weight. Do not lie down or exercise directly after eating; eat your last meal of the day at least 2-3 hours prior to going to sleep. Avoid tight-fitting clothes. If you are  a smoker, reduce or quit smoking. Elevate your head of bed 4-6 inches by putting phone books under the legs at the head of your bed or buy a wedge pillow, but do not use more than two regular pillows as this causes the body to curl and compresses your stomach.     Food group Foods to avoid to reduce reflux   Beverages  Whole milk, 2% milk, chocolate milk/hot chocolate, alcohol, coffee (regular and decaf), caffeinated tea, mint tea, carbonated beverages, citrus juice    Breads/grains Commercial sweet rolls, doughnuts, croissants, and other high-fat pastries    Fruits and vegetables Fried or cream-style vegetables, tomatoes, tomato-based products, citrus fruits, hot peppers    Soups and seasonings Cream, cheese, tomato-based soups, vinegar    Meats and proteins Fatty or fried meat/fish, sarmiento, sausage, pepperoni, lunch meat, fried eggs    Fats and oil Lard, sarmiento drippings, salt pork, meat drippings, gravies, highly seasoned salad dressings, nuts    Sweets/desserts Anything made with or from chocolate, peppermint, spearmint, whole milk, or cream; high-fat pastries, gum, hard candy

## 2023-01-31 ENCOUNTER — OFFICE VISIT (OUTPATIENT)
Dept: SLEEP MEDICINE | Facility: CLINIC | Age: 63
End: 2023-01-31
Payer: COMMERCIAL

## 2023-01-31 VITALS
BODY MASS INDEX: 22.42 KG/M2 | DIASTOLIC BLOOD PRESSURE: 83 MMHG | WEIGHT: 138.88 LBS | HEART RATE: 52 BPM | SYSTOLIC BLOOD PRESSURE: 143 MMHG

## 2023-01-31 DIAGNOSIS — I10 ESSENTIAL HYPERTENSION: Primary | ICD-10-CM

## 2023-01-31 DIAGNOSIS — G47.33 OSA (OBSTRUCTIVE SLEEP APNEA): ICD-10-CM

## 2023-01-31 PROCEDURE — 3079F PR MOST RECENT DIASTOLIC BLOOD PRESSURE 80-89 MM HG: ICD-10-PCS | Mod: CPTII,S$GLB,, | Performed by: NURSE PRACTITIONER

## 2023-01-31 PROCEDURE — 99999 PR PBB SHADOW E&M-EST. PATIENT-LVL III: CPT | Mod: PBBFAC,,, | Performed by: NURSE PRACTITIONER

## 2023-01-31 PROCEDURE — 3079F DIAST BP 80-89 MM HG: CPT | Mod: CPTII,S$GLB,, | Performed by: NURSE PRACTITIONER

## 2023-01-31 PROCEDURE — 3077F PR MOST RECENT SYSTOLIC BLOOD PRESSURE >= 140 MM HG: ICD-10-PCS | Mod: CPTII,S$GLB,, | Performed by: NURSE PRACTITIONER

## 2023-01-31 PROCEDURE — 3008F PR BODY MASS INDEX (BMI) DOCUMENTED: ICD-10-PCS | Mod: CPTII,S$GLB,, | Performed by: NURSE PRACTITIONER

## 2023-01-31 PROCEDURE — 99999 PR PBB SHADOW E&M-EST. PATIENT-LVL III: ICD-10-PCS | Mod: PBBFAC,,, | Performed by: NURSE PRACTITIONER

## 2023-01-31 PROCEDURE — 99214 PR OFFICE/OUTPT VISIT, EST, LEVL IV, 30-39 MIN: ICD-10-PCS | Mod: S$GLB,,, | Performed by: NURSE PRACTITIONER

## 2023-01-31 PROCEDURE — 99214 OFFICE O/P EST MOD 30 MIN: CPT | Mod: S$GLB,,, | Performed by: NURSE PRACTITIONER

## 2023-01-31 PROCEDURE — 1159F PR MEDICATION LIST DOCUMENTED IN MEDICAL RECORD: ICD-10-PCS | Mod: CPTII,S$GLB,, | Performed by: NURSE PRACTITIONER

## 2023-01-31 PROCEDURE — 1159F MED LIST DOCD IN RCRD: CPT | Mod: CPTII,S$GLB,, | Performed by: NURSE PRACTITIONER

## 2023-01-31 PROCEDURE — 3008F BODY MASS INDEX DOCD: CPT | Mod: CPTII,S$GLB,, | Performed by: NURSE PRACTITIONER

## 2023-01-31 PROCEDURE — 3077F SYST BP >= 140 MM HG: CPT | Mod: CPTII,S$GLB,, | Performed by: NURSE PRACTITIONER

## 2023-01-31 NOTE — PROGRESS NOTES
Cc: ALLYN, initial visit wti hher, last seen by Dr. Sun 6/2021.     She continues to sleep qhs with apap 10-14cm. Sleep is more undisturbed with PAP and she gets more sleep time, just takes longer to fall asleep with mask on. Conscious of noise/breathing. Dreamwear mask. Not rested still. ESS=7. Takes some bp meds morning, others bedtime.     Remote 50-61d used, avg 5:29h/n AHI 3.8, 90% tile 11.8cm    BP (!) 143/83   Pulse (!) 52   Wt 63 kg (138 lb 14.2 oz)   BMI 22.42 kg/m²     Assessment:  ALLYN, mild  HTN    PLAN:  Continue  auto 10-14 . DME TH[employer] supplies  Discussed control of ALLYN.    Discussed potential candidacy trial provigil and purpose/defers  See pcp re HTN mgt/continue meds  Rtc 1 yrs, sooner if needed

## 2023-02-02 ENCOUNTER — HOSPITAL ENCOUNTER (OUTPATIENT)
Dept: RADIOLOGY | Facility: OTHER | Age: 63
Discharge: HOME | End: 2023-02-02
Attending: NURSE PRACTITIONER
Payer: COMMERCIAL

## 2023-02-02 DIAGNOSIS — N22 CALCULUS OF URINARY TRACT IN DISEASES CLASSIFIED ELSEWHERE: ICD-10-CM

## 2023-02-02 PROCEDURE — 74176 CT RENAL STONE STUDY ABD PELVIS WO: ICD-10-PCS | Mod: 26,,, | Performed by: RADIOLOGY

## 2023-02-02 PROCEDURE — 74176 CT ABD & PELVIS W/O CONTRAST: CPT | Mod: 26,,, | Performed by: RADIOLOGY

## 2023-02-02 PROCEDURE — 74176 CT ABD & PELVIS W/O CONTRAST: CPT | Mod: TC

## 2023-02-07 NOTE — PROGRESS NOTES
Subjective:      Latia Posada is a 62 y.o. female who returns today regarding her nephrolithiasis and complex renal cyst.     The patient has a history of stones that have passed without intervention. She reports having 3-4 stone episodes over the years. She is s/p parathyroidectomy 2019.    Returns today with CT stone study. Denies recent flank pain or gross hematuria. Denies bothersome urinary symptoms.     The following portions of the patient's history were reviewed and updated as appropriate: allergies, current medications, past family history, past medical history, past social history, past surgical history and problem list.    Review of Systems  Constitutional: no fever or chills  ENT: no nasal congestion or sore throat  Respiratory: no cough or shortness of breath  Cardiovascular: no chest pain or palpitations  Gastrointestinal: no nausea or vomiting, tolerating diet  Genitourinary: as per HPI  Hematologic/Lymphatic: no easy bruising or lymphadenopathy  Musculoskeletal: no arthralgias or myalgias  Neurological: no seizures or tremors  Behavioral/Psych: no auditory or visual hallucinations     Objective:   Vital Signs:   Vitals:    02/08/23 0940   BP: 121/75   Pulse: 76     Physical Exam   General: alert and oriented, no acute distress  Head: normocephalic, atraumatic  Neck: supple, normal ROM  Respiratory: Symmetric expansion, non-labored breathing  Cardiovascular: regular rate and rhythm  Abdomen: soft, non tender, non distended  Pelvic: deferred  Skin: normal coloration and turgor, no rashes, no suspicious skin lesions noted  Neuro: alert and oriented x3, no gross deficits  Psych: normal judgment and insight, normal mood/affect, and non-anxious    Lab Review   Urinalysis demonstrates negative for all components  Lab Results   Component Value Date    WBC 3.59 (L) 06/24/2022    HGB 15.7 06/24/2022    HCT 48.1 06/24/2022    MCV 92 06/24/2022     06/24/2022     Lab Results   Component Value Date     "CREATININE 0.7 11/29/2022    BUN 16 11/29/2022       Imaging   (all images personally reviewed; agree with report below)  CT stone study- ": Kidneys are symmetric in size and position. On the right, 2 adjacent stones in the lower pole collectively measures 6 mm.  No hydronephrosis or hydroureter.  Multiple fluid density foci in the right kidney measuring up to 5.8 cm have the appearance of cysts. On the left, approximately 3 punctate stones in the collecting system measure up to 3 mm.  No hydronephrosis or hydroureter.  Fluid density foci measure up to 4.7 cm which have the appearance of cysts."  ELVER-"Right kidney: The right kidney measures 12.2 cm. No cortical thinning. No loss of corticomedullary distinction. Resistive index measures 0.71.  Approximately 3 cysts, the largest measuring 5.6 cm (previously 5 cm).  Two stones, the largest measuring 0.8 cm.  These show posterior acoustic shadowing.  No hydronephrosis.  Left kidney: The left kidney measures 11.3 cm. No cortical thinning. No loss of corticomedullary distinction. Resistive index measures 0.68.  Three measured cysts, including a thinly septated cyst along the medial aspect of the lower pole measuring 3.3 cm (previously 2.8 cm).  Approximately 3 measured calcifications measuring up to 0.4 cm.  No hydronephrosis.  The bladder is partially distended at the time of scanning and has an unremarkable appearance.:    Assessment:     1. Nephrolithiasis    2. Complex renal cyst      Plan:   Latia was seen today for follow-up.    Diagnoses and all orders for this visit:    Nephrolithiasis  -     X-Ray KUB; Future  -     US Retroperitoneal Complete; Future  -     tamsulosin (FLOMAX) 0.4 mg Cap; Take 1 capsule (0.4 mg total) by mouth once daily.    Complex renal cyst    Plan:  --Stones appear much smaller on CT than ELVER  --Continue with stone surveillance  --Recommend general stone preventive measures, to include increased hydration, low Na diet, and increased citrus " intake  --Follow up with KUB and ELVER in 1 year

## 2023-02-08 ENCOUNTER — OFFICE VISIT (OUTPATIENT)
Dept: UROLOGY | Facility: CLINIC | Age: 63
End: 2023-02-08
Payer: COMMERCIAL

## 2023-02-08 VITALS — DIASTOLIC BLOOD PRESSURE: 75 MMHG | HEART RATE: 76 BPM | SYSTOLIC BLOOD PRESSURE: 121 MMHG

## 2023-02-08 DIAGNOSIS — N20.0 NEPHROLITHIASIS: Primary | ICD-10-CM

## 2023-02-08 DIAGNOSIS — N28.1 COMPLEX RENAL CYST: ICD-10-CM

## 2023-02-08 PROCEDURE — 3078F PR MOST RECENT DIASTOLIC BLOOD PRESSURE < 80 MM HG: ICD-10-PCS | Mod: CPTII,S$GLB,, | Performed by: NURSE PRACTITIONER

## 2023-02-08 PROCEDURE — 3074F SYST BP LT 130 MM HG: CPT | Mod: CPTII,S$GLB,, | Performed by: NURSE PRACTITIONER

## 2023-02-08 PROCEDURE — 1159F MED LIST DOCD IN RCRD: CPT | Mod: CPTII,S$GLB,, | Performed by: NURSE PRACTITIONER

## 2023-02-08 PROCEDURE — 99213 OFFICE O/P EST LOW 20 MIN: CPT | Mod: S$GLB,,, | Performed by: NURSE PRACTITIONER

## 2023-02-08 PROCEDURE — 4010F PR ACE/ARB THEARPY RXD/TAKEN: ICD-10-PCS | Mod: CPTII,S$GLB,, | Performed by: NURSE PRACTITIONER

## 2023-02-08 PROCEDURE — 1160F RVW MEDS BY RX/DR IN RCRD: CPT | Mod: CPTII,S$GLB,, | Performed by: NURSE PRACTITIONER

## 2023-02-08 PROCEDURE — 3074F PR MOST RECENT SYSTOLIC BLOOD PRESSURE < 130 MM HG: ICD-10-PCS | Mod: CPTII,S$GLB,, | Performed by: NURSE PRACTITIONER

## 2023-02-08 PROCEDURE — 3078F DIAST BP <80 MM HG: CPT | Mod: CPTII,S$GLB,, | Performed by: NURSE PRACTITIONER

## 2023-02-08 PROCEDURE — 99213 PR OFFICE/OUTPT VISIT, EST, LEVL III, 20-29 MIN: ICD-10-PCS | Mod: S$GLB,,, | Performed by: NURSE PRACTITIONER

## 2023-02-08 PROCEDURE — 4010F ACE/ARB THERAPY RXD/TAKEN: CPT | Mod: CPTII,S$GLB,, | Performed by: NURSE PRACTITIONER

## 2023-02-08 PROCEDURE — 1160F PR REVIEW ALL MEDS BY PRESCRIBER/CLIN PHARMACIST DOCUMENTED: ICD-10-PCS | Mod: CPTII,S$GLB,, | Performed by: NURSE PRACTITIONER

## 2023-02-08 PROCEDURE — 1159F PR MEDICATION LIST DOCUMENTED IN MEDICAL RECORD: ICD-10-PCS | Mod: CPTII,S$GLB,, | Performed by: NURSE PRACTITIONER

## 2023-02-08 RX ORDER — TAMSULOSIN HYDROCHLORIDE 0.4 MG/1
0.4 CAPSULE ORAL DAILY
Qty: 30 CAPSULE | Refills: 0 | Status: SHIPPED | OUTPATIENT
Start: 2023-02-08 | End: 2023-11-15

## 2023-02-20 ENCOUNTER — HOSPITAL ENCOUNTER (OUTPATIENT)
Dept: RADIOLOGY | Facility: CLINIC | Age: 63
Discharge: HOME | End: 2023-02-20
Attending: INTERNAL MEDICINE
Payer: COMMERCIAL

## 2023-02-20 DIAGNOSIS — M85.80 OSTEOPENIA, UNSPECIFIED LOCATION: ICD-10-CM

## 2023-02-20 PROCEDURE — 77080 DXA BONE DENSITY AXIAL: CPT | Mod: 26,,, | Performed by: INTERNAL MEDICINE

## 2023-02-20 PROCEDURE — 77080 DXA BONE DENSITY AXIAL: CPT | Mod: TC

## 2023-02-20 PROCEDURE — 77080 DEXA BONE DENSITY SPINE HIP: ICD-10-PCS | Mod: 26,,, | Performed by: INTERNAL MEDICINE

## 2023-05-05 DIAGNOSIS — I10 ESSENTIAL HYPERTENSION: ICD-10-CM

## 2023-05-05 RX ORDER — CARVEDILOL 6.25 MG/1
TABLET ORAL
Qty: 180 TABLET | Refills: 2 | Status: SHIPPED | OUTPATIENT
Start: 2023-05-05 | End: 2024-01-30

## 2023-05-05 RX ORDER — OLMESARTAN MEDOXOMIL 40 MG/1
TABLET ORAL
Qty: 90 TABLET | Refills: 2 | Status: SHIPPED | OUTPATIENT
Start: 2023-05-05 | End: 2024-01-30

## 2023-05-05 NOTE — TELEPHONE ENCOUNTER
Refill Decision Note   Latia Posada  is requesting a refill authorization.  Brief Assessment and Rationale for Refill:  Approve     Medication Therapy Plan:       Medication Reconciliation Completed: No   Comments:     Provider Staff:     Action is required for this patient.   Please see care gap opportunities below in Care Due Message.     Thanks!  [facility]     Appointments      Date Provider   Last Visit   11/29/2022 Lizzy Joya MD   Next Visit   Visit date not found Lizzy Joya MD     Note composed:10:12 AM 05/05/2023           Note composed:10:12 AM 05/05/2023

## 2023-05-05 NOTE — TELEPHONE ENCOUNTER
Care Due:                  Date            Visit Type   Department     Provider  --------------------------------------------------------------------------------                                EP -                              PRIMARY      [facility]  Last Visit: 11-      CARE ([facility])   MEDICINE       Lizzy Joya  Next Visit: None Scheduled  None         None Found                                                            Last  Test          Frequency    Reason                     Performed    Due Date  --------------------------------------------------------------------------------    CMP.........  12 months..  atorvastatin.............  06- 06-    Lipid Panel.  12 months..  atorvastatin.............  06- 06-    [employer] Embedded Care Due Messages. Reference number: [id number].   5/05/2023 10:00:46 AM CDT

## 2023-07-19 ENCOUNTER — TELEPHONE (OUTPATIENT)
Dept: INTERNAL MEDICINE | Facility: CLINIC | Age: 63
End: 2023-07-19
Payer: COMMERCIAL

## 2023-07-19 DIAGNOSIS — Z12.31 SCREENING MAMMOGRAM, ENCOUNTER FOR: Primary | ICD-10-CM

## 2023-07-19 NOTE — TELEPHONE ENCOUNTER
Please contact her to schedule her annual exam with me, before the end of the year, or with Nicol.  Usual labs prior, thank you

## 2023-07-25 ENCOUNTER — HOSPITAL ENCOUNTER (OUTPATIENT)
Dept: RADIOLOGY | Facility: OTHER | Age: 63
Discharge: HOME | End: 2023-07-25
Attending: INTERNAL MEDICINE
Payer: COMMERCIAL

## 2023-07-25 DIAGNOSIS — Z12.31 SCREENING MAMMOGRAM, ENCOUNTER FOR: ICD-10-CM

## 2023-07-25 PROCEDURE — 77067 MAMMO DIGITAL SCREENING BILAT WITH TOMO: ICD-10-PCS | Mod: 26,,, | Performed by: RADIOLOGY

## 2023-07-25 PROCEDURE — 77067 SCR MAMMO BI INCL CAD: CPT | Mod: 26,,, | Performed by: RADIOLOGY

## 2023-07-25 PROCEDURE — 77063 BREAST TOMOSYNTHESIS BI: CPT | Mod: 26,,, | Performed by: RADIOLOGY

## 2023-07-25 PROCEDURE — 77067 SCR MAMMO BI INCL CAD: CPT | Mod: TC

## 2023-07-25 PROCEDURE — 77063 MAMMO DIGITAL SCREENING BILAT WITH TOMO: ICD-10-PCS | Mod: 26,,, | Performed by: RADIOLOGY

## 2023-08-09 ENCOUNTER — HOSPITAL ENCOUNTER (OUTPATIENT)
Dept: RADIOLOGY | Facility: OTHER | Age: 63
Discharge: HOME | End: 2023-08-09
Attending: NURSE PRACTITIONER
Payer: COMMERCIAL

## 2023-08-09 DIAGNOSIS — N20.0 NEPHROLITHIASIS: ICD-10-CM

## 2023-08-09 PROCEDURE — 76770 US EXAM ABDO BACK WALL COMP: CPT | Mod: 26,,, | Performed by: RADIOLOGY

## 2023-08-09 PROCEDURE — 74018 XR KUB: ICD-10-PCS | Mod: 26,,, | Performed by: RADIOLOGY

## 2023-08-09 PROCEDURE — 74018 RADEX ABDOMEN 1 VIEW: CPT | Mod: TC,FY

## 2023-08-09 PROCEDURE — 76770 US EXAM ABDO BACK WALL COMP: CPT | Mod: TC

## 2023-08-09 PROCEDURE — 76770 US RETROPERITONEAL COMPLETE: ICD-10-PCS | Mod: 26,,, | Performed by: RADIOLOGY

## 2023-08-09 PROCEDURE — 74018 RADEX ABDOMEN 1 VIEW: CPT | Mod: 26,,, | Performed by: RADIOLOGY

## 2023-08-22 ENCOUNTER — OFFICE VISIT (OUTPATIENT)
Dept: UROLOGY | Facility: CLINIC | Age: 63
End: 2023-08-22
Payer: COMMERCIAL

## 2023-08-22 VITALS
DIASTOLIC BLOOD PRESSURE: 82 MMHG | SYSTOLIC BLOOD PRESSURE: 135 MMHG | BODY MASS INDEX: 22.15 KG/M2 | WEIGHT: 137.81 LBS | HEIGHT: 66 IN | OXYGEN SATURATION: 95 % | HEART RATE: 68 BPM

## 2023-08-22 DIAGNOSIS — N20.0 NEPHROLITHIASIS: Primary | ICD-10-CM

## 2023-08-22 DIAGNOSIS — N28.1 COMPLEX RENAL CYST: ICD-10-CM

## 2023-08-22 PROCEDURE — 1160F RVW MEDS BY RX/DR IN RCRD: CPT | Mod: CPTII,S$GLB,, | Performed by: NURSE PRACTITIONER

## 2023-08-22 PROCEDURE — 1160F PR REVIEW ALL MEDS BY PRESCRIBER/CLIN PHARMACIST DOCUMENTED: ICD-10-PCS | Mod: CPTII,S$GLB,, | Performed by: NURSE PRACTITIONER

## 2023-08-22 PROCEDURE — 99213 OFFICE O/P EST LOW 20 MIN: CPT | Mod: S$GLB,,, | Performed by: NURSE PRACTITIONER

## 2023-08-22 PROCEDURE — 4010F PR ACE/ARB THEARPY RXD/TAKEN: ICD-10-PCS | Mod: CPTII,S$GLB,, | Performed by: NURSE PRACTITIONER

## 2023-08-22 PROCEDURE — 1159F MED LIST DOCD IN RCRD: CPT | Mod: CPTII,S$GLB,, | Performed by: NURSE PRACTITIONER

## 2023-08-22 PROCEDURE — 3079F PR MOST RECENT DIASTOLIC BLOOD PRESSURE 80-89 MM HG: ICD-10-PCS | Mod: CPTII,S$GLB,, | Performed by: NURSE PRACTITIONER

## 2023-08-22 PROCEDURE — 3008F BODY MASS INDEX DOCD: CPT | Mod: CPTII,S$GLB,, | Performed by: NURSE PRACTITIONER

## 2023-08-22 PROCEDURE — 3075F SYST BP GE 130 - 139MM HG: CPT | Mod: CPTII,S$GLB,, | Performed by: NURSE PRACTITIONER

## 2023-08-22 PROCEDURE — 1159F PR MEDICATION LIST DOCUMENTED IN MEDICAL RECORD: ICD-10-PCS | Mod: CPTII,S$GLB,, | Performed by: NURSE PRACTITIONER

## 2023-08-22 PROCEDURE — 4010F ACE/ARB THERAPY RXD/TAKEN: CPT | Mod: CPTII,S$GLB,, | Performed by: NURSE PRACTITIONER

## 2023-08-22 PROCEDURE — 99213 PR OFFICE/OUTPT VISIT, EST, LEVL III, 20-29 MIN: ICD-10-PCS | Mod: S$GLB,,, | Performed by: NURSE PRACTITIONER

## 2023-08-22 PROCEDURE — 3079F DIAST BP 80-89 MM HG: CPT | Mod: CPTII,S$GLB,, | Performed by: NURSE PRACTITIONER

## 2023-08-22 PROCEDURE — 3075F PR MOST RECENT SYSTOLIC BLOOD PRESS GE 130-139MM HG: ICD-10-PCS | Mod: CPTII,S$GLB,, | Performed by: NURSE PRACTITIONER

## 2023-08-22 PROCEDURE — 3008F PR BODY MASS INDEX (BMI) DOCUMENTED: ICD-10-PCS | Mod: CPTII,S$GLB,, | Performed by: NURSE PRACTITIONER

## 2023-08-22 NOTE — PROGRESS NOTES
Subjective:      Latia Posada is a 63 y.o. female who returns today to discuss recent imaging.     The patient has a history of stones that have passed without intervention. She reports having 3-4 stone episodes over the years. She is s/p parathyroidectomy 2019.     Returns today with KUB and ELVER. José recent flank pain, dysuria and gross hematuria.     The following portions of the patient's history were reviewed and updated as appropriate: allergies, current medications, past family history, past medical history, past social history, past surgical history and problem list.    Review of Systems  Constitutional: no fever or chills  ENT: no nasal congestion or sore throat  Respiratory: no cough or shortness of breath  Cardiovascular: no chest pain or palpitations  Gastrointestinal: no nausea or vomiting, tolerating diet  Genitourinary: as per HPI  Hematologic/Lymphatic: no easy bruising or lymphadenopathy  Musculoskeletal: no arthralgias or myalgias  Neurological: no seizures or tremors  Behavioral/Psych: no auditory or visual hallucinations     Objective:   Vital Signs:  Vitals:    08/22/23 0924   BP: 135/82   Pulse: 68     Physical Exam   General: alert and oriented, no acute distress  Head: normocephalic, atraumatic  Neck: supple, normal ROM  Respiratory: Symmetric expansion, non-labored breathing  Cardiovascular: regular rate and rhythm  Abdomen: soft, non tender, non distended  Pelvic: deferred  Skin: normal coloration and turgor, no rashes, no suspicious skin lesions noted  Neuro: alert and oriented x3, no gross deficits  Psych: normal judgment and insight, normal mood/affect, and non-anxious    Lab Review   Urinalysis demonstrates negative for all components  Lab Results   Component Value Date    WBC 3.59 (L) 06/24/2022    HGB 15.7 06/24/2022    HCT 48.1 06/24/2022    MCV 92 06/24/2022     06/24/2022     Lab Results   Component Value Date    CREATININE 0.7 11/29/2022    BUN 16 11/29/2022  "      Imaging   (all images personally reviewed; agree with report below)  KUB- "Bilateral nephrolithiasis, no definite ureteral calculi seen.  Please see ultrasound performed same date for improved evaluation for calculi."  ELVER- "Right kidney: The right kidney measures 13.3 cm. No cortical thinning. No loss of corticomedullary distinction. Resistive index measures 0.62. Multiple cysts, the largest measuring 6.2 cm (previously 5.6 cm). Debris appears to be present within this cyst as seen on cine clips..  Intrarenal stones measure up to 0.7 cm.  No hydronephrosis.  Left kidney: The left kidney measures 11.7 cm. No cortical thinning. No loss of corticomedullary distinction. Resistive index measures 0.66.  Several cysts, including a thinly septated cyst measuring 2.1 cm.  Intrarenal stones measure up to 0.5 cm.  No hydronephrosis.  The bladder is partially distended at the time of scanning and has an unremarkable appearance."  Assessment:     1. Nephrolithiasis    2. Complex renal cyst      Plan:   Latia was seen today for follow-up.    Diagnoses and all orders for this visit:    Nephrolithiasis  -     X-Ray KUB; Future  -     US Retroperitoneal Complete; Future    Complex renal cyst    Plan:  --Discussed imaging results  --Pt prefers observation for her renal stones, could consider ESWL for R renal stone in the future   --Recommend general stone preventive measures, to include increased hydration, low Na diet, and increased citrus intake  --Follow up annually with KUB and ELVER   "

## 2023-08-23 DIAGNOSIS — I10 ESSENTIAL HYPERTENSION: ICD-10-CM

## 2023-08-23 RX ORDER — AMLODIPINE BESYLATE 10 MG/1
10 TABLET ORAL
Qty: 90 TABLET | Refills: 1 | Status: SHIPPED | OUTPATIENT
Start: 2023-08-23 | End: 2024-02-19

## 2023-08-23 NOTE — TELEPHONE ENCOUNTER
Provider Staff:  Action required for this patient     Please see care gap opportunities below in Care Due Message.    Thanks!  [facility]     Appointments      Date Provider   Last Visit   11/29/2022 Lizzy Joya MD   Next Visit   11/15/2023 Lizzy Joya MD     Refill Decision Note   Latia Posada  is requesting a refill authorization.  Brief Assessment and Rationale for Refill:  Approve     Medication Therapy Plan:         Comments:     Note composed:8:29 AM 08/23/2023             Appointments     Last Visit   11/29/2022 Lizzy Joya MD   Next Visit   11/15/2023 Lizzy Joya MD

## 2023-08-23 NOTE — TELEPHONE ENCOUNTER
Care Due:                  Date            Visit Type   Department     Provider  --------------------------------------------------------------------------------                                EP -                              PRIMARY      [facility]  Last Visit: 11-      CARE ([facility])   TJ Joya                              EP -                              PRIMARY      [facility]  Next Visit: 11-      CARE ([facility])   TJ Joya                                                            Last  Test          Frequency    Reason                     Performed    Due Date  --------------------------------------------------------------------------------    CMP.........  12 months..  atorvastatin.............  06- 06-    Lipid Panel.  12 months..  atorvastatin.............  06- 06-    [employer] Embedded Care Due Messages. Reference number: [id number].   8/23/2023 8:22:40 AM CDT

## 2023-09-16 DIAGNOSIS — I10 ESSENTIAL HYPERTENSION: ICD-10-CM

## 2023-09-16 NOTE — TELEPHONE ENCOUNTER
No care due was identified.  [facility] Embedded Care Due Messages. Reference number: [id number].   9/16/2023 6:39:59 PM CDT

## 2023-09-18 RX ORDER — CHLORTHALIDONE 25 MG/1
25 TABLET ORAL DAILY
Qty: 90 TABLET | Refills: 0 | Status: SHIPPED | OUTPATIENT
Start: 2023-09-18 | End: 2023-12-15

## 2023-09-18 NOTE — TELEPHONE ENCOUNTER
Refill Decision Note   Latia Posada  is requesting a refill authorization.  Brief Assessment and Rationale for Refill:  Approve     Medication Therapy Plan:       Medication Reconciliation Completed: No    Comments:     No Care Gaps recommended.     Note composed:10:28 AM 09/18/2023

## 2023-10-01 NOTE — TELEPHONE ENCOUNTER
Refill Routing Note   Medication(s) are not appropriate for processing by [facility] for the following reason(s):      Required labs outdated    [facility] action(s):  Defer Care Due:  None identified            Appointments  past 12m or future 3m with PCP    Date Provider   Last Visit   11/29/2022 Lizzy Joya MD   Next Visit   11/15/2023 Lizzy Joya MD   ED visits in past 90 days: 0        Note composed:5:20 PM 10/01/2023

## 2023-10-01 NOTE — TELEPHONE ENCOUNTER
No care due was identified.  [facility] Embedded Care Due Messages. Reference number: [id number].   10/01/2023 5:29:29 AM CDT

## 2023-10-02 RX ORDER — ATORVASTATIN CALCIUM 20 MG/1
TABLET, FILM COATED ORAL
Qty: 90 TABLET | Refills: 3 | Status: SHIPPED | OUTPATIENT
Start: 2023-10-02 | End: 2023-12-18 | Stop reason: SDUPTHER

## 2023-11-15 ENCOUNTER — OFFICE VISIT (OUTPATIENT)
Dept: INTERNAL MEDICINE | Facility: CLINIC | Age: 63
End: 2023-11-15
Payer: COMMERCIAL

## 2023-11-15 VITALS
SYSTOLIC BLOOD PRESSURE: 120 MMHG | BODY MASS INDEX: 21.86 KG/M2 | WEIGHT: 136 LBS | DIASTOLIC BLOOD PRESSURE: 75 MMHG | HEIGHT: 66 IN

## 2023-11-15 DIAGNOSIS — N28.1 ACQUIRED CYST OF KIDNEY: ICD-10-CM

## 2023-11-15 DIAGNOSIS — E78.2 MIXED HYPERLIPIDEMIA: ICD-10-CM

## 2023-11-15 DIAGNOSIS — G47.33 OSA (OBSTRUCTIVE SLEEP APNEA): ICD-10-CM

## 2023-11-15 DIAGNOSIS — Z90.89 H/O PARATHYROIDECTOMY: ICD-10-CM

## 2023-11-15 DIAGNOSIS — K21.9 LARYNGOPHARYNGEAL REFLUX (LPR): ICD-10-CM

## 2023-11-15 DIAGNOSIS — M85.80 OSTEOPENIA, UNSPECIFIED LOCATION: ICD-10-CM

## 2023-11-15 DIAGNOSIS — J84.10 LUNG GRANULOMA: ICD-10-CM

## 2023-11-15 DIAGNOSIS — Z13.6 ENCOUNTER FOR SCREENING FOR CARDIOVASCULAR DISORDERS: ICD-10-CM

## 2023-11-15 DIAGNOSIS — I10 ESSENTIAL HYPERTENSION: ICD-10-CM

## 2023-11-15 DIAGNOSIS — Z98.890 H/O PARATHYROIDECTOMY: ICD-10-CM

## 2023-11-15 DIAGNOSIS — E04.1 THYROID NODULE: ICD-10-CM

## 2023-11-15 DIAGNOSIS — Z80.0 FAMILY HISTORY OF COLON CANCER: ICD-10-CM

## 2023-11-15 DIAGNOSIS — Z00.00 ANNUAL PHYSICAL EXAM: Primary | ICD-10-CM

## 2023-11-15 PROCEDURE — 99999 PR PBB SHADOW E&M-EST. PATIENT-LVL IV: CPT | Mod: PBBFAC,,, | Performed by: INTERNAL MEDICINE

## 2023-11-15 PROCEDURE — 4010F PR ACE/ARB THEARPY RXD/TAKEN: ICD-10-PCS | Mod: CPTII,S$GLB,, | Performed by: INTERNAL MEDICINE

## 2023-11-15 PROCEDURE — 99999 PR PBB SHADOW E&M-EST. PATIENT-LVL IV: ICD-10-PCS | Mod: PBBFAC,,, | Performed by: INTERNAL MEDICINE

## 2023-11-15 PROCEDURE — 1159F MED LIST DOCD IN RCRD: CPT | Mod: CPTII,S$GLB,, | Performed by: INTERNAL MEDICINE

## 2023-11-15 PROCEDURE — 3078F PR MOST RECENT DIASTOLIC BLOOD PRESSURE < 80 MM HG: ICD-10-PCS | Mod: CPTII,S$GLB,, | Performed by: INTERNAL MEDICINE

## 2023-11-15 PROCEDURE — 3074F SYST BP LT 130 MM HG: CPT | Mod: CPTII,S$GLB,, | Performed by: INTERNAL MEDICINE

## 2023-11-15 PROCEDURE — 3008F BODY MASS INDEX DOCD: CPT | Mod: CPTII,S$GLB,, | Performed by: INTERNAL MEDICINE

## 2023-11-15 PROCEDURE — 99396 PR PREVENTIVE VISIT,EST,40-64: ICD-10-PCS | Mod: S$GLB,,, | Performed by: INTERNAL MEDICINE

## 2023-11-15 PROCEDURE — 3008F PR BODY MASS INDEX (BMI) DOCUMENTED: ICD-10-PCS | Mod: CPTII,S$GLB,, | Performed by: INTERNAL MEDICINE

## 2023-11-15 PROCEDURE — 4010F ACE/ARB THERAPY RXD/TAKEN: CPT | Mod: CPTII,S$GLB,, | Performed by: INTERNAL MEDICINE

## 2023-11-15 PROCEDURE — 1159F PR MEDICATION LIST DOCUMENTED IN MEDICAL RECORD: ICD-10-PCS | Mod: CPTII,S$GLB,, | Performed by: INTERNAL MEDICINE

## 2023-11-15 PROCEDURE — 3074F PR MOST RECENT SYSTOLIC BLOOD PRESSURE < 130 MM HG: ICD-10-PCS | Mod: CPTII,S$GLB,, | Performed by: INTERNAL MEDICINE

## 2023-11-15 PROCEDURE — 99396 PREV VISIT EST AGE 40-64: CPT | Mod: S$GLB,,, | Performed by: INTERNAL MEDICINE

## 2023-11-15 PROCEDURE — 3078F DIAST BP <80 MM HG: CPT | Mod: CPTII,S$GLB,, | Performed by: INTERNAL MEDICINE

## 2023-11-15 RX ORDER — FLUTICASONE PROPIONATE 50 MCG
1 SPRAY, SUSPENSION (ML) NASAL DAILY
Qty: 16 G | Refills: 1 | Status: SHIPPED | OUTPATIENT
Start: 2023-11-15

## 2023-11-15 NOTE — PROGRESS NOTES
Patient ID: Latia Posada is a 63 y.o. female.    Chief Complaint: Annual Exam      Assessment:       1. Annual physical exam    2. Osteopenia, unspecified location    3. H/O parathyroidectomy: 2019    4. ALLYN (obstructive sleep apnea): mild see sleep study 4/21; seen in sleep clinic 4/21    5. Mixed hyperlipidemia    6. Essential hypertension    7. Acquired cyst of kidney: stable on u/s 2017, also 8/22; 8/23    8. Family history of colon cancer: father age 60    9. Thyroid nodule: stable 2014, 2017.  Also 8/18, 2019 and 8/21; stable 12/22    10. Laryngopharyngeal reflux (LPR): seen in ENT 12/22    11. Lung granuloma: seen on CT 2019    12. Encounter for screening for cardiovascular disorders          Plan:           1. Annual physical exam  -     CBC Auto Differential; Future; Expected date: 11/15/2023  -     Comprehensive Metabolic Panel; Future; Expected date: 11/15/2023  -     Hemoglobin A1C; Future; Expected date: 11/15/2023  -     Lipid Panel; Future; Expected date: 11/15/2023  -     Vitamin D; Future; Expected date: 11/15/2023  -     Vitamin B12; Future; Expected date: 11/15/2023  -     TSH; Future; Expected date: 11/15/2023    2. Osteopenia, unspecified location    3. H/O parathyroidectomy: 2019    4. ALLYN (obstructive sleep apnea): mild see sleep study 4/21; seen in sleep clinic 4/21    5. Mixed hyperlipidemia    6. Essential hypertension    7. Acquired cyst of kidney: stable on u/s 2017, also 8/22; 8/23    8. Family history of colon cancer: father age 60    9. Thyroid nodule: stable 2014, 2017.  Also 8/18, 2019 and 8/21; stable 12/22  -     US Soft Tissue Head Neck Thyroid; Future; Expected date: 11/15/2023    10. Laryngopharyngeal reflux (LPR): seen in ENT 12/22    11. Lung granuloma: seen on CT 2019    12. Encounter for screening for cardiovascular disorders  -     CT Cardiac Scoring; Future; Expected date: 11/15/2023    Other orders  -     fluticasone propionate (FLONASE) 50 mcg/actuation nasal spray;  1 spray (50 mcg total) by Each Nostril route once daily.  Dispense: 16 g; Refill: 1       One of the CT scans that she had of her neck suggested significant carotid atherosclerosis  She is on statin therapy, will obtain coronary CT for determining aggressiveness of treatment  Continue current regimen  Will repeat thyroid ultrasound given that she is still symptomatic, will consider Endocrine surgery assessment  Flu shot, COVID booster, Prevnar 20, shingles vaccine, RSV all recommended  I will review all studies and determine further tx depending on findings      Subjective:   Annual exam    Overall doing well.  Exercises regularly.  Blood pressure is doing well.    Continues to have issues of difficulty with occasional swallowing pills.  She is not sure if it is related to her enlarged thyroid or whether she has LPR; LPR was seen when she saw ENT last year.  She saw ENT last year and they did not think that she required surgery; she also has seen General surgery and they also did not think that she needed surgery.  Discussed that if she is very bothered by her goiter, we can send her to endocrine surgery.    Recent labs acceptable.    Patient Active Problem List:     Essential hypertension     Menopause syndrome     Thyroid nodule: stable 2014, 2017.  Also 8/18, 2019 and 8/21; stable 12/22     Mixed hyperlipidemia     Family history of colon cancer: father age 60     Vitamin D deficiency disease     Acquired cyst of kidney: stable on u/s 2017, also 8/22; 8/23     Calculus of kidney     History of abnormal mammogram     Ganglion cyst of dorsum of left wrist     Ganglion cyst of dorsum of right wrist     ALLYN (obstructive sleep apnea): mild see sleep study 4/21; seen in sleep clinic 4/21     Hypokalemia     H/O parathyroidectomy: 2019     Osteopenia: stable 2/23     Laryngopharyngeal reflux (LPR): seen in ENT 12/22     Lung granuloma: seen on CT 2019           Review of Systems   Constitutional:  Negative for fatigue.    HENT:  Positive for trouble swallowing. Negative for hearing loss.         See HPI   Eyes:  Negative for visual disturbance.   Respiratory:  Positive for apnea. Negative for shortness of breath.         Uses CPAP   Cardiovascular:  Negative for chest pain.   Gastrointestinal:  Negative for abdominal pain, constipation and diarrhea.   Genitourinary:  Negative for dysuria, frequency and vaginal bleeding.   Musculoskeletal:  Negative for joint swelling.   Skin:  Negative for rash.   Neurological:  Negative for weakness, light-headedness and headaches.   Psychiatric/Behavioral:  Negative for sleep disturbance.          Objective:      Physical Exam  Vitals and nursing note reviewed.   Constitutional:       Appearance: She is well-developed.   HENT:      Head: Normocephalic and atraumatic.      Right Ear: External ear normal.      Left Ear: External ear normal.      Nose: Nose normal.      Mouth/Throat:      Pharynx: No oropharyngeal exudate.   Eyes:      General: No scleral icterus.     Extraocular Movements: Extraocular movements intact.      Conjunctiva/sclera: Conjunctivae normal.   Neck:      Thyroid: Thyromegaly present.      Vascular: No JVD.   Cardiovascular:      Rate and Rhythm: Normal rate and regular rhythm.      Heart sounds: Normal heart sounds. No murmur heard.     No gallop.   Pulmonary:      Effort: Pulmonary effort is normal. No respiratory distress.      Breath sounds: Normal breath sounds. No wheezing.   Abdominal:      General: Bowel sounds are normal. There is no distension.      Palpations: Abdomen is soft. There is no mass.      Tenderness: There is no abdominal tenderness. There is no guarding or rebound.   Musculoskeletal:         General: No tenderness. Normal range of motion.      Cervical back: Normal range of motion and neck supple.   Lymphadenopathy:      Cervical: No cervical adenopathy.   Skin:     General: Skin is warm.      Findings: No erythema or rash.   Neurological:      General:  No focal deficit present.      Mental Status: She is alert and oriented to person, place, and time.      Cranial Nerves: No cranial nerve deficit.      Coordination: Coordination normal.   Psychiatric:         Behavior: Behavior normal.         Thought Content: Thought content normal.         Judgment: Judgment normal.             Health Maintenance Due   Topic Date Due    Pneumococcal Vaccines (Age 0-64) (1 - PCV) Never done    Shingles Vaccine (1 of 2) Never done    RSV Vaccine (Age 60+) (1 - 1-dose 60+ series) Never done    Influenza Vaccine (1) 09/01/2023    COVID-19 Vaccine (5 - 2023-24 season) 09/01/2023

## 2023-12-04 ENCOUNTER — HOSPITAL ENCOUNTER (OUTPATIENT)
Dept: RADIOLOGY | Facility: HOSPITAL | Age: 63
Discharge: HOME | End: 2023-12-04
Attending: INTERNAL MEDICINE
Payer: COMMERCIAL

## 2023-12-04 DIAGNOSIS — E04.1 THYROID NODULE: ICD-10-CM

## 2023-12-04 DIAGNOSIS — Z13.6 ENCOUNTER FOR SCREENING FOR CARDIOVASCULAR DISORDERS: ICD-10-CM

## 2023-12-04 PROCEDURE — 75571 CT HRT W/O DYE W/CA TEST: CPT | Mod: TC

## 2023-12-04 PROCEDURE — 76536 US EXAM OF HEAD AND NECK: CPT | Mod: 26,,, | Performed by: RADIOLOGY

## 2023-12-04 PROCEDURE — 76536 US SOFT TISSUE HEAD NECK THYROID: ICD-10-PCS | Mod: 26,,, | Performed by: RADIOLOGY

## 2023-12-04 PROCEDURE — 75571 CT HRT W/O DYE W/CA TEST: CPT | Mod: 26,,, | Performed by: RADIOLOGY

## 2023-12-04 PROCEDURE — 76536 US EXAM OF HEAD AND NECK: CPT | Mod: TC

## 2023-12-04 PROCEDURE — 75571 CT CALCIUM SCORING CARDIAC: ICD-10-PCS | Mod: 26,,, | Performed by: RADIOLOGY

## 2023-12-07 ENCOUNTER — PATIENT MESSAGE (OUTPATIENT)
Dept: INTERNAL MEDICINE | Facility: CLINIC | Age: 63
End: 2023-12-07
Payer: COMMERCIAL

## 2023-12-15 DIAGNOSIS — I10 ESSENTIAL HYPERTENSION: ICD-10-CM

## 2023-12-15 RX ORDER — CHLORTHALIDONE 25 MG/1
25 TABLET ORAL
Qty: 90 TABLET | Refills: 3 | Status: SHIPPED | OUTPATIENT
Start: 2023-12-15

## 2023-12-15 NOTE — TELEPHONE ENCOUNTER
Refill Decision Note   Latia Posada  is requesting a refill authorization.  Brief Assessment and Rationale for Refill:  Approve     Medication Therapy Plan:       Medication Reconciliation Completed: No   Comments:     No Care Gaps recommended.     Note composed:12:38 PM 12/15/2023

## 2023-12-15 NOTE — TELEPHONE ENCOUNTER
No care due was identified.  [facility] Embedded Care Due Messages. Reference number: [id number].   12/15/2023 8:31:54 AM CST

## 2023-12-18 ENCOUNTER — TELEPHONE (OUTPATIENT)
Dept: INTERNAL MEDICINE | Facility: CLINIC | Age: 63
End: 2023-12-18

## 2023-12-18 RX ORDER — ATORVASTATIN CALCIUM 40 MG/1
40 TABLET, FILM COATED ORAL DAILY
Qty: 90 TABLET | Refills: 3 | Status: SHIPPED | OUTPATIENT
Start: 2023-12-18

## 2023-12-18 RX ORDER — ATORVASTATIN CALCIUM 40 MG/1
40 TABLET, FILM COATED ORAL DAILY
Qty: 90 TABLET | Refills: 3 | Status: SHIPPED | OUTPATIENT
Start: 2023-12-18 | End: 2023-12-18 | Stop reason: SDUPTHER

## 2023-12-18 NOTE — TELEPHONE ENCOUNTER
----- Message from Breann Yoo MA sent at 12/18/2023  9:41 AM CST -----  Contact: april@ [phone]  Pt called                In regards to needing medication refilled with the correct dosage of 40 mg instead of 20mg (atorvastatin (LIPITOR) 20 MG tablet).

## 2023-12-18 NOTE — TELEPHONE ENCOUNTER
PATIENT INFORMATION  Follow-Up  - Return for your 2 month well child visit.     Visit - After Visit Summary - Adapted from Bright Futures and the American Academy of Pediatrics    Anticipatory guidance discussed.  Adequate diet for breastfeeding  Aavoid putting to bed with bottle  Call for jaundice, decreased feeding, or fever  Car seat issues, including proper placement  Eencouraged that any formula used be iron-fortified    Parental and Family Well Being is Important for Infant Health  · Call your pediatrician or obstetrician if you feel sad, blue or overwhelmed for more than a few days.  · If your baby is crying, and you feel yourself losing control, secure your baby either in a car seat or crib and to go to another room to calm down.  It can be very stressful dealing with a crying infant and please know it is normal to need that few moments to gather your strength and return to the baby.  This is a good time to ask trusted friends and family to hold/soothe the baby while you take some time to yourself.    · Never hesitate to ask for help from family and friends.  · Give your other children small, safe ways to help you with the baby.  · Spend special time alone with each child whenever possible.    Feeding Your Baby  · Feed only breast milk or iron-fortified formula, no water or cow’s milk.  · Feed when your baby is hungry.  Some cues that show you he/she is hungry include: putting hand to mouth, sucking or rooting, fussing.    · Stop feeding when your baby is full.  Signs that he/she is full include: turning away, closing mouth, relaxing hands.  · If breastfeeding: breastfeed 8-12 times per day, make sure your baby has 6-8 wet diapers a day, try to wait until 3-4 weeks of age to use a pacifier to avoid nipple confusion.  Let us know if you would like a referral to a lactation consultant to help with breastfeeding.  · If formula feeding: offer your baby 2 ounces every 2-3 hours, more if still hungry.   Please clarify pt dose on LIPITOR . Her chart is 20 mg but pt sated that  she takes 40 mg   Hold your baby so you can look at each other while feeding.  Do not prop bottle.      Baby Care  · Use a rectal thermometer, not an ear or skin thermometer.    · A true fever is when the rectal thermometer reads 100.4 degrees F or higher  · In babies 3 months of age and younger, fevers are serious.  Call us if your baby has a temperature of 100.4 or higher.  · Have everyone who touches the baby wash their hands first.  · Avoid crowds.  · Keep your baby out of the sun; use sunscreen only if there is no shade.  · Avoid people with colds and flu whenever possible.    · Keep your baby’s umbilical cord clean and dry - keep the diaper below the cord until it falls off in 10-14 days and call us if it becomes red, if there is fluid in the area or if it smells.      Getting to Know Your Baby  · Get to know each other by holding and touching your baby.  · Talk to your baby often.  · Let your baby see your face and eyes.  · Learn what calms your baby, such as rocking or stroking.    Safety  · Put your baby to sleep on his or her back in a safe crib, in your room, and not in your bed.  Either swaddle your baby or use tightly tucked blankets.  Do not use loose, soft bedding or toys in the crib.  Use a crib with slats that are 2 and 3/8 inches apart or less.    · Use a rear-facing car safety seat in the middle of the back seat in all vehicles  · Never put your baby in a seat with a passenger air bag  · Keep a hand on your baby when changing diapers and clothes    Health and Safety Tips  Bright Futures 2 to 5 days Parent Education  Futuros Brillantes 2 a 5 días de edad (Educación para los padres) - Español    Bright Futures 1 month Parent Education  Futuros Brillantes 1 mes de edad (Educación para los padres) - Español    Common dosing for acetaminophen and ibuprofen:   Acetaminophen (Tylenol, etc.) can be given every 4 hours.  · Infant Drops: 160mg/5ml for  Weight 6-11 lbs   Dose 1.25 ml     Additional Educational Resources:  For  additional resources regarding your symptoms, diagnosis, or further health information, please visit the Online Health Resources section in MyChart.

## 2024-01-30 ENCOUNTER — OFFICE VISIT (OUTPATIENT)
Dept: OPTOMETRY | Facility: CLINIC | Age: 64
End: 2024-01-30
Payer: COMMERCIAL

## 2024-01-30 DIAGNOSIS — G47.33 OSA (OBSTRUCTIVE SLEEP APNEA): ICD-10-CM

## 2024-01-30 DIAGNOSIS — I10 ESSENTIAL HYPERTENSION: ICD-10-CM

## 2024-01-30 DIAGNOSIS — H52.203 MYOPIA WITH ASTIGMATISM AND PRESBYOPIA, BILATERAL: ICD-10-CM

## 2024-01-30 DIAGNOSIS — H47.393 OPTIC NERVE CUPPING OF BOTH EYES: Primary | ICD-10-CM

## 2024-01-30 DIAGNOSIS — H52.13 MYOPIA WITH ASTIGMATISM AND PRESBYOPIA, BILATERAL: ICD-10-CM

## 2024-01-30 DIAGNOSIS — H25.13 NUCLEAR SCLEROSIS OF BOTH EYES: ICD-10-CM

## 2024-01-30 DIAGNOSIS — H52.4 MYOPIA WITH ASTIGMATISM AND PRESBYOPIA, BILATERAL: ICD-10-CM

## 2024-01-30 PROCEDURE — G2211 COMPLEX E/M VISIT ADD ON: HCPCS | Mod: S$GLB,,, | Performed by: OPTOMETRIST

## 2024-01-30 PROCEDURE — 99999 PR PBB SHADOW E&M-EST. PATIENT-LVL II: CPT | Mod: PBBFAC,,, | Performed by: OPTOMETRIST

## 2024-01-30 PROCEDURE — 1159F MED LIST DOCD IN RCRD: CPT | Mod: CPTII,S$GLB,, | Performed by: OPTOMETRIST

## 2024-01-30 PROCEDURE — 4010F ACE/ARB THERAPY RXD/TAKEN: CPT | Mod: CPTII,S$GLB,, | Performed by: OPTOMETRIST

## 2024-01-30 PROCEDURE — 92015 DETERMINE REFRACTIVE STATE: CPT | Mod: S$GLB,,, | Performed by: OPTOMETRIST

## 2024-01-30 PROCEDURE — 99214 OFFICE O/P EST MOD 30 MIN: CPT | Mod: S$GLB,,, | Performed by: OPTOMETRIST

## 2024-01-30 PROCEDURE — 92133 CPTRZD OPH DX IMG PST SGM ON: CPT | Mod: S$GLB,,, | Performed by: OPTOMETRIST

## 2024-01-30 RX ORDER — CARVEDILOL 6.25 MG/1
TABLET ORAL
Qty: 180 TABLET | Refills: 1 | Status: SHIPPED | OUTPATIENT
Start: 2024-01-30

## 2024-01-30 RX ORDER — OLMESARTAN MEDOXOMIL 40 MG/1
TABLET ORAL
Qty: 90 TABLET | Refills: 3 | Status: SHIPPED | OUTPATIENT
Start: 2024-01-30

## 2024-01-30 NOTE — TELEPHONE ENCOUNTER
No care due was identified.  Health [facility] Embedded Care Due Messages. Reference number: [id number].   1/30/2024 5:30:22 AM CST

## 2024-01-30 NOTE — TELEPHONE ENCOUNTER
Refill Decision Note   Latia Posada  is requesting a refill authorization.    Brief Assessment and Rationale for Refill:  Approve       Medication Therapy Plan:         Comments:     Note composed:12:15 PM 01/30/2024

## 2024-01-31 NOTE — PROGRESS NOTES
VIJAY    TAWNYA: 7/21/2022 - Dr. Ramirez    CC: Pt is here today for a routine eye exam. Pt states that vision has   been stable since last exam    (-)Dryness  (-)Burning  (-)Itchiness  (-)Tearing  (-)Flashes  (-)Floaters   (+)Photophobia - occasionally  (-)Eye Pain      Diabetic: no    Contact Lens: no    Eye Meds: no      Last edited by Jade Castro MA on 1/30/2024  2:57 PM.            Assessment /Plan     For exam results, see Encounter Report.    Optic nerve cupping of both eyes    Thin rim inferior OU  -     Posterior Segment OCT Optic Nerve-OD thin ST and IT, OS thin T and IT  ALLYN (obstructive sleep apnea): mild see sleep study 4/21; seen in sleep clinic 4/21    Essential hypertension   No retinopathy  Retina flat and intact OU--no holes, tears, breaks, or RDs.  Eye health normal OU.      Nuclear sclerosis of both eyes  Patient happy with vision.  Monitor.    Myopia with astigmatism and presbyopia, bilateral   Rx specs       RTC 6 mo HVF OCT IOP pachy gonio

## 2024-02-04 RX ORDER — POTASSIUM CHLORIDE 1500 MG/1
20 TABLET, EXTENDED RELEASE ORAL
Qty: 90 TABLET | Refills: 3 | Status: SHIPPED | OUTPATIENT
Start: 2024-02-04

## 2024-02-04 NOTE — TELEPHONE ENCOUNTER
Latia Posada  is requesting a refill authorization.  Brief Assessment and Rationale for Refill:  Approve     Medication Therapy Plan:         Comments:     Note composed:10:35 AM 02/04/2024

## 2024-02-04 NOTE — TELEPHONE ENCOUNTER
No care due was identified.  [facility] Embedded Care Due Messages. Reference number: [id number].   2/04/2024 3:20:23 AM CST

## 2024-02-19 ENCOUNTER — OFFICE VISIT (OUTPATIENT)
Dept: UROLOGY | Facility: CLINIC | Age: 64
End: 2024-02-19
Payer: COMMERCIAL

## 2024-02-19 VITALS
BODY MASS INDEX: 22.25 KG/M2 | RESPIRATION RATE: 18 BRPM | HEIGHT: 66 IN | WEIGHT: 138.44 LBS | SYSTOLIC BLOOD PRESSURE: 111 MMHG | DIASTOLIC BLOOD PRESSURE: 69 MMHG | HEART RATE: 69 BPM | OXYGEN SATURATION: 96 %

## 2024-02-19 DIAGNOSIS — N28.1 COMPLEX RENAL CYST: Primary | ICD-10-CM

## 2024-02-19 DIAGNOSIS — N20.0 NEPHROLITHIASIS: ICD-10-CM

## 2024-02-19 DIAGNOSIS — I10 ESSENTIAL HYPERTENSION: ICD-10-CM

## 2024-02-19 PROCEDURE — 1159F MED LIST DOCD IN RCRD: CPT | Mod: CPTII,S$GLB,, | Performed by: NURSE PRACTITIONER

## 2024-02-19 PROCEDURE — 99213 OFFICE O/P EST LOW 20 MIN: CPT | Mod: S$GLB,,, | Performed by: NURSE PRACTITIONER

## 2024-02-19 PROCEDURE — 4010F ACE/ARB THERAPY RXD/TAKEN: CPT | Mod: CPTII,S$GLB,, | Performed by: NURSE PRACTITIONER

## 2024-02-19 PROCEDURE — 1160F RVW MEDS BY RX/DR IN RCRD: CPT | Mod: CPTII,S$GLB,, | Performed by: NURSE PRACTITIONER

## 2024-02-19 PROCEDURE — 3078F DIAST BP <80 MM HG: CPT | Mod: CPTII,S$GLB,, | Performed by: NURSE PRACTITIONER

## 2024-02-19 PROCEDURE — 3074F SYST BP LT 130 MM HG: CPT | Mod: CPTII,S$GLB,, | Performed by: NURSE PRACTITIONER

## 2024-02-19 PROCEDURE — 3008F BODY MASS INDEX DOCD: CPT | Mod: CPTII,S$GLB,, | Performed by: NURSE PRACTITIONER

## 2024-02-19 RX ORDER — AMLODIPINE BESYLATE 10 MG/1
10 TABLET ORAL
Qty: 90 TABLET | Refills: 2 | Status: SHIPPED | OUTPATIENT
Start: 2024-02-19

## 2024-02-19 NOTE — TELEPHONE ENCOUNTER
No care due was identified.  [facility] Embedded Care Due Messages. Reference number: [id number].   2/19/2024 6:09:20 AM CST

## 2024-02-19 NOTE — TELEPHONE ENCOUNTER
Refill Decision Note   Latia Posada  is requesting a refill authorization.  Brief Assessment and Rationale for Refill:  Approve     Medication Therapy Plan:         Comments:     Note composed:6:28 AM 02/19/2024

## 2024-02-19 NOTE — PROGRESS NOTES
Subjective:      Latia Posada is a 63 y.o. female who returns today regarding her nephrolithiasis.     The patient has a history of stones that have passed without intervention. She reports having 3-4 stone episodes over the years. She is s/p parathyroidectomy 2019.     Imaging is due in August. Denies recent flank pain, dysuria and gross hematuria.   She would like to have imaging done sooner.    The following portions of the patient's history were reviewed and updated as appropriate: allergies, current medications, past family history, past medical history, past social history, past surgical history and problem list.    Review of Systems  Constitutional: no fever or chills  ENT: no nasal congestion or sore throat  Respiratory: no cough or shortness of breath  Cardiovascular: no chest pain or palpitations  Gastrointestinal: no nausea or vomiting, tolerating diet  Genitourinary: as per HPI  Hematologic/Lymphatic: no easy bruising or lymphadenopathy  Musculoskeletal: no arthralgias or myalgias  Neurological: no seizures or tremors  Behavioral/Psych: no auditory or visual hallucinations     Objective:   Vital Signs:  Vitals:    02/19/24 1057   BP: 111/69   Pulse: 69   Resp: 18     Physical Exam   General: alert and oriented, no acute distress  Head: normocephalic, atraumatic  Neck: supple, normal ROM  Respiratory: Symmetric expansion, non-labored breathing  Cardiovascular: regular rate and rhythm  Abdomen: soft, non tender, non distended  Pelvic: deferred  Skin: normal coloration and turgor, no rashes, no suspicious skin lesions noted  Neuro: alert and oriented x3, no gross deficits  Psych: normal judgment and insight, normal mood/affect, and non-anxious    Lab Review   Urinalysis demonstrates negative for all components  Lab Results   Component Value Date    WBC 4.31 12/04/2023    HGB 14.9 12/04/2023    HCT 44.5 12/04/2023    MCV 89 12/04/2023     12/04/2023     Lab Results   Component Value Date     CREATININE 0.8 12/04/2023    BUN 16 12/04/2023       Imaging   None    Assessment:     1. Complex renal cyst    2. Nephrolithiasis      Plan:   Diagnoses and all orders for this visit:    Complex renal cyst    Nephrolithiasis    Plan:  --Will obtain ELVER and KUB  --Again discussed observation vs ESWL vs URS. Pt will likely wish to proceed with ESWL pending imaging results

## 2024-03-06 ENCOUNTER — HOSPITAL ENCOUNTER (OUTPATIENT)
Dept: RADIOLOGY | Facility: OTHER | Age: 64
Discharge: HOME | End: 2024-03-06
Attending: NURSE PRACTITIONER
Payer: COMMERCIAL

## 2024-03-06 DIAGNOSIS — N20.0 NEPHROLITHIASIS: ICD-10-CM

## 2024-03-06 PROCEDURE — 74018 RADEX ABDOMEN 1 VIEW: CPT | Mod: TC,FY

## 2024-03-06 PROCEDURE — 76770 US EXAM ABDO BACK WALL COMP: CPT | Mod: 26,,, | Performed by: RADIOLOGY

## 2024-03-06 PROCEDURE — 76770 US EXAM ABDO BACK WALL COMP: CPT | Mod: TC

## 2024-03-06 PROCEDURE — 74018 RADEX ABDOMEN 1 VIEW: CPT | Mod: 26,,, | Performed by: RADIOLOGY

## 2024-05-08 ENCOUNTER — OFFICE VISIT (OUTPATIENT)
Dept: SLEEP MEDICINE | Facility: CLINIC | Age: 64
End: 2024-05-08
Payer: COMMERCIAL

## 2024-05-08 VITALS
HEART RATE: 83 BPM | WEIGHT: 139.75 LBS | SYSTOLIC BLOOD PRESSURE: 118 MMHG | DIASTOLIC BLOOD PRESSURE: 81 MMHG | BODY MASS INDEX: 22.56 KG/M2

## 2024-05-08 DIAGNOSIS — G47.33 OSA (OBSTRUCTIVE SLEEP APNEA): Primary | ICD-10-CM

## 2024-05-08 DIAGNOSIS — I10 ESSENTIAL HYPERTENSION: ICD-10-CM

## 2024-05-08 PROCEDURE — 1159F MED LIST DOCD IN RCRD: CPT | Mod: CPTII,S$GLB,, | Performed by: NURSE PRACTITIONER

## 2024-05-08 PROCEDURE — 3079F DIAST BP 80-89 MM HG: CPT | Mod: CPTII,S$GLB,, | Performed by: NURSE PRACTITIONER

## 2024-05-08 PROCEDURE — 4010F ACE/ARB THERAPY RXD/TAKEN: CPT | Mod: CPTII,S$GLB,, | Performed by: NURSE PRACTITIONER

## 2024-05-08 PROCEDURE — 99214 OFFICE O/P EST MOD 30 MIN: CPT | Mod: S$GLB,,, | Performed by: NURSE PRACTITIONER

## 2024-05-08 PROCEDURE — 3074F SYST BP LT 130 MM HG: CPT | Mod: CPTII,S$GLB,, | Performed by: NURSE PRACTITIONER

## 2024-05-08 PROCEDURE — 3008F BODY MASS INDEX DOCD: CPT | Mod: CPTII,S$GLB,, | Performed by: NURSE PRACTITIONER

## 2024-05-08 PROCEDURE — 99999 PR PBB SHADOW E&M-EST. PATIENT-LVL III: CPT | Mod: PBBFAC,,, | Performed by: NURSE PRACTITIONER

## 2024-05-08 NOTE — PROGRESS NOTES
Cc: ALLYN    She continues to sleep regularly with apap 10-14cm. Sleep remains more undisturbed with PAP and she gets more sleep time. dreamwear mask. Tired but doesn't nap  Bp stable   Remote 30d avg 5:48h/n AHI 3.0, 90 %tile 11.6cm    /81 (BP Location: Left arm, Patient Position: Sitting, BP Method: Small (Automatic))   Pulse 83   Wt 63.4 kg (139 lb 12.4 oz)   BMI 22.56 kg/m²     Assessment:  ALLYN, mild. AHI<5. Adherent and benefits from therapy  HTN    PLAN:  Continue  auto 10-14 . DME [facility] supplies  Discussed control of ALLYN.    See pcp re HTN mgt/continue meds  Rtc 1 yrs, sooner if needed

## 2024-05-09 ENCOUNTER — PATIENT MESSAGE (OUTPATIENT)
Dept: SLEEP MEDICINE | Facility: CLINIC | Age: 64
End: 2024-05-09
Payer: COMMERCIAL

## 2024-06-10 ENCOUNTER — PATIENT MESSAGE (OUTPATIENT)
Dept: INTERNAL MEDICINE | Facility: CLINIC | Age: 64
End: 2024-06-10
Payer: COMMERCIAL

## 2024-07-17 DIAGNOSIS — G47.33 OSA (OBSTRUCTIVE SLEEP APNEA): Primary | ICD-10-CM

## 2024-07-28 DIAGNOSIS — I10 ESSENTIAL HYPERTENSION: ICD-10-CM

## 2024-07-28 NOTE — TELEPHONE ENCOUNTER
No care due was identified.  [facility] Embedded Care Due Messages. Reference number: [id number].   7/28/2024 5:32:02 AM CDT

## 2024-07-29 RX ORDER — CARVEDILOL 6.25 MG/1
TABLET ORAL
Qty: 180 TABLET | Refills: 1 | Status: SHIPPED | OUTPATIENT
Start: 2024-07-29

## 2024-07-29 NOTE — TELEPHONE ENCOUNTER
Latia Posada  is requesting a refill authorization.  Brief Assessment and Rationale for Refill:  Approve     Medication Therapy Plan:         Comments:     Note composed:5:07 AM 07/29/2024

## 2024-08-22 NOTE — PROGRESS NOTES
Subjective:      Latia Posada is a 64 y.o. female who returns today regarding her nephrolithiasis.      The patient has a history of stones that have passed without intervention. She reports having 3-4 stone episodes over the years. She is s/p parathyroidectomy 2019.     Returns today with KUB and ELVER. She denies recent flank pain and gross hematuria. Denies bothersome urinary symptoms.     The following portions of the patient's history were reviewed and updated as appropriate: allergies, current medications, past family history, past medical history, past social history, past surgical history and problem list.    Review of Systems  Constitutional: no fever or chills  ENT: no nasal congestion or sore throat  Respiratory: no cough or shortness of breath  Cardiovascular: no chest pain or palpitations  Gastrointestinal: no nausea or vomiting, tolerating diet  Genitourinary: as per HPI  Hematologic/Lymphatic: no easy bruising or lymphadenopathy  Musculoskeletal: no arthralgias or myalgias  Neurological: no seizures or tremors  Behavioral/Psych: no auditory or visual hallucinations     Objective:   Vital Signs:   Vitals:    08/23/24 1046   BP: 120/76   Pulse: 66   Resp: 18     Physical Exam   General: alert and oriented, no acute distress  Head: normocephalic, atraumatic  Neck: supple, normal ROM  Respiratory: Symmetric expansion, non-labored breathing  Cardiovascular: regular rate and rhythm  Abdomen: soft, non tender, non distended  Pelvic: deferred  Skin: normal coloration and turgor, no rashes, no suspicious skin lesions noted  Neuro: alert and oriented x3, no gross deficits  Psych: normal judgment and insight, normal mood/affect, and non-anxious    Lab Review   Urinalysis demonstrates negative for all components  Lab Results   Component Value Date    WBC 4.31 12/04/2023    HGB 14.9 12/04/2023    HCT 44.5 12/04/2023    MCV 89 12/04/2023     12/04/2023     Lab Results   Component Value Date    CREATININE  "0.8 12/04/2023    BUN 16 12/04/2023       Imaging   (all images personally reviewed; agree with report below)  ELVER- "FINDINGS:  Right kidney: The right kidney measures 13.1 cm. No cortical thinning. No loss of corticomedullary distinction. Resistive index measures 0.63.  No solid mass identified.  Multiple large renal cysts are present.  Several calcifications are noted including a 6 mm calcification in the lower pole..  No hydronephrosis.  Left kidney: The left kidney measures 11.5 cm. No cortical thinning. No loss of corticomedullary distinction. Resistive index measures 0.50.  No solid mass.  Multiple dominant cysts are identified.  Small calcifications measuring up to 5 mm in the interpolar region..  No hydronephrosis.  The bladder is partially distended at the time of scanning and has an unremarkable appearance.  Impression:  Bilateral renal cysts and nonobstructing nephrolithiasis"    KUB- "FINDINGS:  There are 2 high-density foci projecting along the left upper quadrant.  It is possible these represent medication tablets residing in the stomach.  Calcification projecting over the right kidney measuring 3 mm may represent an intrarenal stone.  No obvious calcifications along the course of either ureter or urinary bladder.  Surgical clips right upper quadrant.  Nonobstructive bowel gas pattern.  Impression:Please see above. "  Assessment:     1. Nephrolithiasis    2. Complex renal cyst      Plan:   Diagnoses and all orders for this visit:    Nephrolithiasis  -     tamsulosin (FLOMAX) 0.4 mg Cap; Take 1 capsule (0.4 mg total) by mouth once daily.  -     US Retroperitoneal Complete; Future  -     X-Ray KUB; Future    Complex renal cyst    Plan:  --Reviewed imaging- stable cysts and stones  --Again discussed ESWL vs URS, pt prefers observation which is reasonable  --Follow up in 1 year with KUB and ELVER  --She requests for for flomax to have on hand       "

## 2024-08-23 ENCOUNTER — OFFICE VISIT (OUTPATIENT)
Dept: UROLOGY | Facility: CLINIC | Age: 64
End: 2024-08-23
Payer: COMMERCIAL

## 2024-08-23 ENCOUNTER — TELEPHONE (OUTPATIENT)
Dept: UROLOGY | Facility: CLINIC | Age: 64
End: 2024-08-23

## 2024-08-23 VITALS
BODY MASS INDEX: 22.46 KG/M2 | HEART RATE: 66 BPM | SYSTOLIC BLOOD PRESSURE: 120 MMHG | HEIGHT: 66 IN | RESPIRATION RATE: 18 BRPM | WEIGHT: 139.75 LBS | DIASTOLIC BLOOD PRESSURE: 76 MMHG

## 2024-08-23 DIAGNOSIS — N28.1 COMPLEX RENAL CYST: ICD-10-CM

## 2024-08-23 DIAGNOSIS — N20.0 NEPHROLITHIASIS: Primary | ICD-10-CM

## 2024-08-23 RX ORDER — TAMSULOSIN HYDROCHLORIDE 0.4 MG/1
0.4 CAPSULE ORAL DAILY
Qty: 30 CAPSULE | Refills: 0 | Status: SHIPPED | OUTPATIENT
Start: 2024-08-23 | End: 2024-09-22

## 2024-08-23 NOTE — TELEPHONE ENCOUNTER
----- Message from Twyla Velazquez NP sent at 8/23/2024 11:13 AM CDT -----  Follow up with me in 1 year with ANITRA and ELVER just prior. Thanks

## 2024-09-04 ENCOUNTER — HOSPITAL ENCOUNTER (OUTPATIENT)
Dept: RADIOLOGY | Facility: OTHER | Age: 64
Discharge: HOME | End: 2024-09-04
Attending: INTERNAL MEDICINE
Payer: COMMERCIAL

## 2024-09-04 DIAGNOSIS — Z12.31 ENCOUNTER FOR SCREENING MAMMOGRAM FOR BREAST CANCER: ICD-10-CM

## 2024-09-04 PROCEDURE — 77067 SCR MAMMO BI INCL CAD: CPT | Mod: TC

## 2024-09-11 ENCOUNTER — TELEPHONE (OUTPATIENT)
Dept: INTERNAL MEDICINE | Facility: CLINIC | Age: 64
End: 2024-09-11
Payer: COMMERCIAL

## 2024-10-16 ENCOUNTER — TELEPHONE (OUTPATIENT)
Dept: INTERNAL MEDICINE | Facility: CLINIC | Age: 64
End: 2024-10-16
Payer: COMMERCIAL

## 2024-10-16 ENCOUNTER — OFFICE VISIT (OUTPATIENT)
Dept: INTERNAL MEDICINE | Facility: CLINIC | Age: 64
End: 2024-10-16
Payer: COMMERCIAL

## 2024-10-16 ENCOUNTER — IMMUNIZATION (OUTPATIENT)
Dept: INTERNAL MEDICINE | Facility: CLINIC | Age: 64
End: 2024-10-16
Payer: COMMERCIAL

## 2024-10-16 VITALS
OXYGEN SATURATION: 96 % | SYSTOLIC BLOOD PRESSURE: 100 MMHG | BODY MASS INDEX: 22.75 KG/M2 | DIASTOLIC BLOOD PRESSURE: 80 MMHG | HEIGHT: 66 IN | WEIGHT: 141.56 LBS | HEART RATE: 76 BPM

## 2024-10-16 DIAGNOSIS — E78.2 MIXED HYPERLIPIDEMIA: ICD-10-CM

## 2024-10-16 DIAGNOSIS — G47.33 OSA (OBSTRUCTIVE SLEEP APNEA): ICD-10-CM

## 2024-10-16 DIAGNOSIS — E04.1 THYROID NODULE: ICD-10-CM

## 2024-10-16 DIAGNOSIS — N20.0 CALCULUS OF KIDNEY: ICD-10-CM

## 2024-10-16 DIAGNOSIS — E89.2 POSTPROCEDURAL HYPOPARATHYROIDISM: ICD-10-CM

## 2024-10-16 DIAGNOSIS — Z23 NEED FOR VACCINATION: ICD-10-CM

## 2024-10-16 DIAGNOSIS — Z98.890 H/O PARATHYROIDECTOMY: ICD-10-CM

## 2024-10-16 DIAGNOSIS — K21.9 LARYNGOPHARYNGEAL REFLUX (LPR): ICD-10-CM

## 2024-10-16 DIAGNOSIS — I10 ESSENTIAL HYPERTENSION: ICD-10-CM

## 2024-10-16 DIAGNOSIS — Z23 NEED FOR VACCINATION: Primary | ICD-10-CM

## 2024-10-16 DIAGNOSIS — N95.1 MENOPAUSE SYNDROME: ICD-10-CM

## 2024-10-16 DIAGNOSIS — Z90.89 H/O PARATHYROIDECTOMY: ICD-10-CM

## 2024-10-16 DIAGNOSIS — N28.1 ACQUIRED CYST OF KIDNEY: ICD-10-CM

## 2024-10-16 DIAGNOSIS — J84.10 LUNG GRANULOMA: ICD-10-CM

## 2024-10-16 DIAGNOSIS — M85.80 OSTEOPENIA, UNSPECIFIED LOCATION: ICD-10-CM

## 2024-10-16 DIAGNOSIS — E55.9 VITAMIN D DEFICIENCY DISEASE: ICD-10-CM

## 2024-10-16 DIAGNOSIS — Z00.00 ANNUAL PHYSICAL EXAM: Primary | ICD-10-CM

## 2024-10-16 PROCEDURE — 4010F ACE/ARB THERAPY RXD/TAKEN: CPT | Mod: CPTII,S$GLB,, | Performed by: NURSE PRACTITIONER

## 2024-10-16 PROCEDURE — 3079F DIAST BP 80-89 MM HG: CPT | Mod: CPTII,S$GLB,, | Performed by: NURSE PRACTITIONER

## 2024-10-16 PROCEDURE — 3008F BODY MASS INDEX DOCD: CPT | Mod: CPTII,S$GLB,, | Performed by: NURSE PRACTITIONER

## 2024-10-16 PROCEDURE — 1159F MED LIST DOCD IN RCRD: CPT | Mod: CPTII,S$GLB,, | Performed by: NURSE PRACTITIONER

## 2024-10-16 PROCEDURE — 99999 PR PBB SHADOW E&M-EST. PATIENT-LVL IV: CPT | Mod: PBBFAC,,, | Performed by: NURSE PRACTITIONER

## 2024-10-16 PROCEDURE — 3074F SYST BP LT 130 MM HG: CPT | Mod: CPTII,S$GLB,, | Performed by: NURSE PRACTITIONER

## 2024-10-16 PROCEDURE — 91320 SARSCV2 VAC 30MCG TRS-SUC IM: CPT | Mod: S$GLB,,, | Performed by: INTERNAL MEDICINE

## 2024-10-16 PROCEDURE — 90656 IIV3 VACC NO PRSV 0.5 ML IM: CPT | Mod: S$GLB,,, | Performed by: NURSE PRACTITIONER

## 2024-10-16 PROCEDURE — 90471 IMMUNIZATION ADMIN: CPT | Mod: S$GLB,,, | Performed by: NURSE PRACTITIONER

## 2024-10-16 PROCEDURE — 90480 ADMN SARSCOV2 VAC 1/ONLY CMP: CPT | Mod: S$GLB,,, | Performed by: INTERNAL MEDICINE

## 2024-10-16 PROCEDURE — 99396 PREV VISIT EST AGE 40-64: CPT | Mod: 25,S$GLB,, | Performed by: NURSE PRACTITIONER

## 2024-10-16 NOTE — PROGRESS NOTES
Internal Medicine Annual Exam       CHIEF COMPLAINT     The patient, Latia Posada, who is a 64 y.o. female presents for an annual exam.    HPI   History of Present Illness    CHIEF COMPLAINT:  Latia presents today for annual follow-up.    FAMILY HISTORY:  Her father, previously noted to have a history of cancerous colon polyps, has recently been diagnosed with esophageal cancer and is currently in rehabilitation following a prolonged hospitalization. Her sister has been diagnosed with hypertension.    SOCIAL HISTORY:  She reports rare alcohol use and denies tobacco or drug use.    MEDICATIONS:  Current medications include Amlodipine 10 mg, Chlorthalidone 25 mg, Olmesartan 40 mg, Carvedilol 6.25 mg twice daily, Atorvastatin 40 mg, and Vitamin D. She uses Tamsulosin as needed for kidney pain, most recently for a few days due to kidney discomfort.    MEDICAL HISTORY:  She has a history of hypertension, hyperlipidemia, osteopenia, thyroid nodules, kidney stones, and acid reflux. She reports no new updates to her personal medical history since her last visit. She was recently diagnosed with acid reflux and continues to experience some aggravating symptoms. She denies taking any medications for reflux but reports following dietary modifications as recommended.    GASTROINTESTINAL:  She experiences acid reflux symptoms including throat irritation, throat congestion, frequent throat clearing, feeling of a lump in the throat, and occasional difficulty swallowing. She also reports occasional constipation.    GENITOURINARY:  She recently experienced kidney pain, for which she took Tamsulosin for a few days. She has a history of kidney stones.    NEUROLOGICAL:  She experiences rare headaches.    PREVENTIVE HEALTH:  A bone density scan in February 2023 revealed osteopenia. A mammogram in September 2023 showed normal results. She underwent a colonoscopy in April 2022, with a recommendation for repeat screening in 5 years due  to family history. A thyroid ultrasound was performed in December 2022 to monitor nodules, with plans for annual follow-up. She also reports having undergone a laryngoscopy by an ENT specialist at an unspecified date.    LAB RESULTS:  Her cholesterol levels from last year's labs were very good. Her A1C results from the same period were borderline, not in the diabetic range, but in a pre-diabetic state that requires monitoring.      ROS:  General: -fever, -chills, -fatigue, -weight gain, -weight loss  Eyes: -vision changes, -redness, -discharge  ENT: -ear pain, -nasal congestion, -sore throat  Cardiovascular: -chest pain, -palpitations, -lower extremity edema  Respiratory: -cough, -shortness of breath  Gastrointestinal: -abdominal pain, -nausea, -vomiting, -diarrhea, +constipation, -blood in stool  Genitourinary: -dysuria, -hematuria, -frequency  Musculoskeletal: -joint pain, -muscle pain  Skin: -rash, -lesion  Neurological: -headache, -dizziness, -numbness, -tingling  Psychiatric: -anxiety, -depression, -sleep difficulty           Past Medical History:  Past Medical History:   Diagnosis Date    Cataract     Chronic kidney disease     Family history of colon cancer 12/4/2012    General anesthetics causing adverse effect in therapeutic use     Hypercalcemia 8/7/2018    Hyperlipidemia     Hypertension     Kidney stones 12/4/2012    Menopause syndrome 12/4/2012    Thyroid nodule 12/4/2012    Vitamin D deficiency disease 2/3/2014       Past Surgical History:   Procedure Laterality Date    BREAST CYST EXCISION Right     CHOLECYSTECTOMY      COLONOSCOPY N/A 4/1/2022    Procedure: COLONOSCOPY;  Surgeon: Karson Goodman MD;  Location: [address];  Service: Endoscopy;  Laterality: N/A;  fully vaccinated-inst portal-tb    DENTAL SURGERY      DILATION AND CURETTAGE OF UTERUS      HYSTERECTOMY  age 42    RONALD (fibroids, AUB), RSO (cyst) ME    PARATHYROIDECTOMY N/A 10/18/2019    Procedure: PARATHYROIDECTOMY;  Surgeon:  Myesha Chaney MD;  Location: [facility];  Service: General;  Laterality: N/A;        Family History   Problem Relation Name Age of Onset    Heart disease Mother 50s 45        smoker    Hypertension Mother 50s     Heart disease Father      Kidney disease Father      Diabetes Father      Colon cancer Father      Cancer Father          COLON    Hypertension Sister      Heart disease Maternal Grandfather      Breast cancer Paternal Grandmother      Cancer Paternal Grandmother          BREAST    Ovarian cancer Neg Hx          Social History     Socioeconomic History    Marital status: Single   Tobacco Use    Smoking status: Never    Smokeless tobacco: Never   Substance and Sexual Activity    Alcohol use: Yes     Comment: rarely    Drug use: No    Sexual activity: Not Currently     Partners: Male     Birth control/protection: Post-menopausal     Social Drivers of Health     Financial Resource Strain: Low Risk  (10/15/2024)    Overall Financial Resource Strain (CARDIA)     Difficulty of Paying Living Expenses: Not very hard   Food Insecurity: No Food Insecurity (10/15/2024)    Hunger Vital Sign     Worried About Running Out of Food in the Last Year: Never true     Ran Out of Food in the Last Year: Never true   Transportation Needs: Unmet Transportation Needs (8/9/2021)    PRAPARE - Transportation     Lack of Transportation (Non-Medical): Yes   Physical Activity: Insufficiently Active (10/15/2024)    Exercise Vital Sign     Days of Exercise per Week: 3 days     Minutes of Exercise per Session: 20 min   Stress: No Stress Concern Present (10/15/2024)    [employer] - Occupational Stress Questionnaire     Feeling of Stress : Only a little   Housing Stability: Unknown (10/15/2024)    Housing Stability Vital Sign     Unable to Pay for Housing in the Last Year: No        Social History     Tobacco Use   Smoking Status Never   Smokeless Tobacco Never        Allergies as of 10/16/2024 - Reviewed  10/16/2024   Allergen Reaction Noted    Lactose  02/25/2013          Home Medications:  Prior to Admission medications    Medication Sig Start Date End Date Taking? Authorizing Provider   amLODIPine (NORVASC) 10 MG tablet TAKE 1 TABLET(10 MG) BY MOUTH EVERY DAY 2/19/24  Yes Lizzy Joya MD   atorvastatin (LIPITOR) 40 MG tablet Take 1 tablet (40 mg total) by mouth once daily. 12/18/23  Yes Lizzy Joya MD   carvediloL (COREG) 6.25 MG tablet TAKE 1 TABLET(6.25 MG) BY MOUTH TWICE DAILY WITH MEALS 7/29/24  Yes Lizzy Joya MD   chlorthalidone (HYGROTEN) 25 MG Tab TAKE 1 TABLET(25 MG) BY MOUTH EVERY DAY 12/15/23  Yes Lizzy Joya MD   cholecalciferol, vitamin D3, (VITAMIN D3) 1,000 unit capsule Take 2 capsules (2,000 Units total) by mouth once daily. 10/9/19  Yes Lizzy Joya MD   fluticasone propionate (FLONASE) 50 mcg/actuation nasal spray 1 spray (50 mcg total) by Each Nostril route once daily. 11/15/23  Yes Lizzy Joya MD   olmesartan (BENICAR) 40 MG tablet TAKE 1 TABLET(40 MG) BY MOUTH EVERY DAY 1/30/24  Yes Lizzy Joya MD   potassium chloride (K-TAB) 20 mEq TAKE 1 TABLET BY MOUTH EVERY DAY 2/4/24  Yes Lizzy Joya MD   tamsulosin (FLOMAX) 0.4 mg Cap Take 1 capsule (0.4 mg total) by mouth once daily. 8/23/24 9/22/24  Twyla Velazquez NP       Review of Systems:  Review of Systems   Constitutional:  Negative for activity change and unexpected weight change.   HENT:  Positive for trouble swallowing. Negative for hearing loss and rhinorrhea.    Eyes:  Negative for discharge and visual disturbance.   Respiratory:  Negative for chest tightness and wheezing.    Cardiovascular:  Negative for chest pain and palpitations.   Gastrointestinal:  Positive for constipation. Negative for blood in stool, diarrhea and vomiting.   Endocrine: Negative for polydipsia and polyuria.   Genitourinary:  Negative for difficulty urinating, dysuria, hematuria and menstrual problem.   Musculoskeletal:  " Positive for neck pain. Negative for arthralgias and joint swelling.   Neurological:  Positive for headaches. Negative for weakness.   Psychiatric/Behavioral:  Positive for confusion. Negative for dysphoric mood.        Health Maintainence:   Immunizations:  Health Maintenance         Date Due Completion Date    Pneumococcal Vaccines (Age 0-64) (1 of 2 - PCV) Never done ---    Shingles Vaccine (1 of 2) Never done ---    RSV Vaccine (Age 60+ and Pregnant patients) (1 - Risk 60-74 years 1-dose series) Never done ---    Influenza Vaccine (1) 09/01/2024 11/15/2023    COVID-19 Vaccine (5 - 2024-25 season) 09/01/2024 12/6/2022    DEXA Scan 02/20/2025 2/20/2023    Mammogram 09/04/2025 9/4/2024    Override on 7/1/2011: Done    TETANUS VACCINE 05/09/2026 5/9/2016    Colorectal Cancer Screening 04/01/2027 4/1/2022    Lipid Panel 12/04/2028 12/4/2023             PHYSICAL EXAM     /80 (BP Location: Right arm, Patient Position: Sitting)   Pulse 76   Ht 5' 6" (1.676 m)   Wt 64.2 kg (141 lb 8.6 oz)   SpO2 96%   BMI 22.84 kg/m²  Body mass index is 22.84 kg/m².    Physical Exam  Vitals reviewed.   Constitutional:       Appearance: She is well-developed.   HENT:      Head: Normocephalic.      Right Ear: External ear normal.      Left Ear: External ear normal.      Nose: Nose normal.      Mouth/Throat:      Pharynx: No oropharyngeal exudate.   Eyes:      Pupils: Pupils are equal, round, and reactive to light.   Neck:      Thyroid: Thyromegaly present.      Vascular: No JVD.      Trachea: No tracheal deviation.   Cardiovascular:      Rate and Rhythm: Normal rate and regular rhythm.      Heart sounds: Normal heart sounds. No murmur heard.     No friction rub. No gallop.   Pulmonary:      Effort: Pulmonary effort is normal. No respiratory distress.      Breath sounds: Normal breath sounds. No wheezing or rales.   Abdominal:      General: Bowel sounds are normal. There is no distension.      Palpations: Abdomen is soft.      " Tenderness: There is no abdominal tenderness.   Musculoskeletal:         General: No tenderness. Normal range of motion.      Cervical back: Neck supple.   Lymphadenopathy:      Cervical: No cervical adenopathy.   Skin:     General: Skin is warm and dry.      Findings: No rash.   Neurological:      Mental Status: She is alert and oriented to person, place, and time.   Psychiatric:         Behavior: Behavior normal.         LABS     Lab Results   Component Value Date    HGBA1C 5.9 (H) 12/04/2023     CMP  Sodium   Date Value Ref Range Status   12/04/2023 142 136 - 145 mmol/L Final     Potassium   Date Value Ref Range Status   12/04/2023 3.9 3.5 - 5.1 mmol/L Final     Chloride   Date Value Ref Range Status   12/04/2023 107 95 - 110 mmol/L Final     CO2   Date Value Ref Range Status   12/04/2023 27 23 - 29 mmol/L Final     Glucose   Date Value Ref Range Status   12/04/2023 107 70 - 110 mg/dL Final     BUN   Date Value Ref Range Status   12/04/2023 16 8 - 23 mg/dL Final     Creatinine   Date Value Ref Range Status   12/04/2023 0.8 0.5 - 1.4 mg/dL Final     Calcium   Date Value Ref Range Status   12/04/2023 10.0 8.7 - 10.5 mg/dL Final     Total Protein   Date Value Ref Range Status   12/04/2023 7.2 6.0 - 8.4 g/dL Final     Albumin   Date Value Ref Range Status   12/04/2023 4.1 3.5 - 5.2 g/dL Final     Total Bilirubin   Date Value Ref Range Status   12/04/2023 0.7 0.1 - 1.0 mg/dL Final     Comment:     For infants and newborns, interpretation of results should be based  on gestational age, weight and in agreement with clinical  observations.    Premature Infant recommended reference ranges:  Up to 24 hours.............<8.0 mg/dL  Up to 48 hours............<12.0 mg/dL  3-5 days..................<15.0 mg/dL  6-29 days.................<15.0 mg/dL       Alkaline Phosphatase   Date Value Ref Range Status   12/04/2023 56 55 - 135 U/L Final     AST   Date Value Ref Range Status   12/04/2023 20 10 - 40 U/L Final     ALT   Date  Value Ref Range Status   12/04/2023 20 10 - 44 U/L Final     Anion Gap   Date Value Ref Range Status   12/04/2023 8 8 - 16 mmol/L Final     eGFR if    Date Value Ref Range Status   06/24/2022 >60 >60 mL/min/1.73 m^2 Final     eGFR if non    Date Value Ref Range Status   06/24/2022 >60 >60 mL/min/1.73 m^2 Final     Comment:     Calculation used to obtain the estimated glomerular filtration  rate (eGFR) is the CKD-EPI equation.        Lab Results   Component Value Date    WBC 4.31 12/04/2023    HGB 14.9 12/04/2023    HCT 44.5 12/04/2023    MCV 89 12/04/2023     12/04/2023     Lab Results   Component Value Date    CHOL 122 12/04/2023    CHOL 137 06/24/2022    CHOL 134 12/08/2020     Lab Results   Component Value Date    HDL 48 12/04/2023    HDL 56 06/24/2022    HDL 58 12/08/2020     Lab Results   Component Value Date    LDLCALC 59.2 (L) 12/04/2023    LDLCALC 63.8 06/24/2022    LDLCALC 61.2 (L) 12/08/2020     Lab Results   Component Value Date    TRIG 74 12/04/2023    TRIG 86 06/24/2022    TRIG 74 12/08/2020     Lab Results   Component Value Date    CHOLHDL 39.3 12/04/2023    CHOLHDL 40.9 06/24/2022    CHOLHDL 43.3 12/08/2020     Lab Results   Component Value Date    TSH 1.854 12/04/2023       ASSESSMENT/PLAN     Latia DEION Posada is a 64 y.o. female    Annual physical exam- All age and gender related screenings discussed   -     CBC Auto Differential; Future; Expected date: 10/16/2024  -     Comprehensive Metabolic Panel; Future; Expected date: 10/16/2024  -     Hemoglobin A1C; Future; Expected date: 10/16/2024  -     Lipid Panel; Future; Expected date: 10/16/2024  -     TSH; Future; Expected date: 10/16/2024  -     Vitamin D; Future; Expected date: 10/16/2024  -     PTH, intact; Future; Expected date: 10/16/2024    Mixed hyperlipidemia- stable. Will cont lipitor and low cholesterol diet   -     Lipid Panel; Future; Expected date: 10/16/2024    Essential hypertension- stable. Will  cont amlodipine, benicar and chlorthalidone   -     Comprehensive Metabolic Panel; Future; Expected date: 10/16/2024    Menopause syndrome- stable. Will monitor     Acquired cyst of kidney: stable on u/s 2017, also 8/22; 8/23- stable. Will monitor     Calculus of kidney- stable. Will monitor     Vitamin D deficiency disease- stable. Will cont vitamin D supplement   -     Vitamin D; Future; Expected date: 10/16/2024    Thyroid nodule: stable 2014, 2017.  Also 8/18, 2019 and 8/21; stable 12/22, also 12/23- repeat 1 year  -     US Soft Tissue Head Neck; Future; Expected date: 11/16/2024    H/O parathyroidectomy: 2019- stable. Will monitor PTH     Laryngopharyngeal reflux (LPR): seen in ENT 12/22- stable. discussed dietary changes and lifestyle changes: small meals and sit up for 2 hours after meals     Osteopenia, unspecified location- stable. Will cont to monitor dexa and cont vitamin D and calcium and discussed weight bearing exercises     ALLYN (obstructive sleep apnea): mild see sleep study 4/21; seen in sleep clinic 4/21- will cont to f/u with sleep medicine     Lung granuloma: seen on CT 2019    Postprocedural hypoparathyroidism  -     PTH, intact; Future; Expected date: 10/16/2024    Need for vaccination  -     Influenza - Trivalent - PF (ADULT)           Follow up with PCP     Nicol RICH, APRN, FNP-c   Department of Internal Medicine - [facility]  2:46 PM

## 2024-10-21 ENCOUNTER — HOSPITAL ENCOUNTER (OUTPATIENT)
Dept: RADIOLOGY | Facility: OTHER | Age: 64
Discharge: HOME | End: 2024-10-21
Attending: NURSE PRACTITIONER
Payer: COMMERCIAL

## 2024-10-21 DIAGNOSIS — E04.1 THYROID NODULE: ICD-10-CM

## 2024-10-21 PROCEDURE — 76536 US EXAM OF HEAD AND NECK: CPT | Mod: 26,,, | Performed by: RADIOLOGY

## 2024-10-21 PROCEDURE — 76536 US EXAM OF HEAD AND NECK: CPT | Mod: TC

## 2024-10-23 DIAGNOSIS — E87.6 HYPOKALEMIA: Primary | ICD-10-CM

## 2024-11-20 DIAGNOSIS — I10 ESSENTIAL HYPERTENSION: ICD-10-CM

## 2024-11-20 RX ORDER — AMLODIPINE BESYLATE 10 MG/1
10 TABLET ORAL
Qty: 90 TABLET | Refills: 0 | Status: SHIPPED | OUTPATIENT
Start: 2024-11-20

## 2024-11-20 NOTE — TELEPHONE ENCOUNTER
Provider Staff:  Action required for this patient    Requires appointment      Please see care gap opportunities below in Care Due Message.    Thanks!  [facility]     Appointments      Date Provider   Last Visit   11/15/2023 Lizzy Joya MD   Next Visit   Visit date not found Lizzy Joya MD     Refill Decision Note   Latia Posada  is requesting a refill authorization.  Brief Assessment and Rationale for Refill:  Approve     Medication Therapy Plan:         Comments:     Note composed:10:21 AM 11/20/2024             Appointments     Last Visit   11/15/2023 Lizzy Joya MD   Next Visit   Visit date not found Lizzy Joya MD

## 2024-12-05 DIAGNOSIS — I10 ESSENTIAL HYPERTENSION: ICD-10-CM

## 2024-12-05 RX ORDER — CHLORTHALIDONE 25 MG/1
25 TABLET ORAL
Qty: 90 TABLET | Refills: 0 | Status: SHIPPED | OUTPATIENT
Start: 2024-12-05

## 2024-12-05 NOTE — TELEPHONE ENCOUNTER
No care due was identified.  [facility] Embedded Care Due Messages. Reference number: [id number].   12/05/2024 3:20:20 AM CST

## 2024-12-05 NOTE — TELEPHONE ENCOUNTER
Refill Routing Note   Medication(s) are not appropriate for processing by [facility] for the following reason(s):        Required labs abnormal    [facility] action(s):  Defer               Appointments  past 12m or future 3m with PCP    Date Provider   Last Visit   11/15/2023 Lizzy Joya MD   Next Visit   Visit date not found Lizzy Joya MD   ED visits in past 90 days: 0        Note composed:11:26 AM 12/05/2024

## 2024-12-17 RX ORDER — ATORVASTATIN CALCIUM 40 MG/1
40 TABLET, FILM COATED ORAL
Qty: 90 TABLET | Refills: 0 | Status: SHIPPED | OUTPATIENT
Start: 2024-12-17

## 2024-12-17 NOTE — TELEPHONE ENCOUNTER
Refill Decision Note   Latia Posada  is requesting a refill authorization.  Brief Assessment and Rationale for Refill:  Approve     Medication Therapy Plan:         Comments:     Note composed:12:55 PM 12/17/2024

## 2024-12-17 NOTE — TELEPHONE ENCOUNTER
No care due was identified.  [facility] Embedded Care Due Messages. Reference number: [id number].   12/17/2024 3:20:22 AM CST

## 2025-01-28 DIAGNOSIS — I10 ESSENTIAL HYPERTENSION: ICD-10-CM

## 2025-01-28 RX ORDER — CARVEDILOL 6.25 MG/1
TABLET ORAL
Qty: 180 TABLET | Refills: 0 | Status: SHIPPED | OUTPATIENT
Start: 2025-01-28

## 2025-01-28 NOTE — TELEPHONE ENCOUNTER
Care Due:                  Date            Visit Type   Department     Provider  --------------------------------------------------------------------------------                                EP -                              PRIMARY      [facility]  Last Visit: 11-      CARE ([facility])   MEDICINE       Lizzy Joya  Next Visit: None Scheduled  None         None Found                                                            Last  Test          Frequency    Reason                     Performed    Due Date  --------------------------------------------------------------------------------    Office Visit  15 months..  amLODIPine, atorvastatin,   11-   02-                             chlorthalidone,                             olmesartan, potassium....    [employer] Embedded Care Due Messages. Reference number: [id number].   1/28/2025 5:27:44 AM CST

## 2025-01-28 NOTE — TELEPHONE ENCOUNTER
Provider Staff:  Action required for this patient    Requires appointment      Please see care gap opportunities below in Care Due Message.    Thanks!  [facility]     Appointments      Date Provider   Last Visit   11/15/2023 Lizzy Joya MD   Next Visit   Visit date not found Lizzy Joya MD     Refill Decision Note   Latia Posada  is requesting a refill authorization.  Brief Assessment and Rationale for Refill:  Approve     Medication Therapy Plan: 10/2024 Annual OV Nicol Fuller NP      Comments:     Note composed:12:19 PM 01/28/2025

## 2025-02-05 DIAGNOSIS — I10 ESSENTIAL HYPERTENSION: ICD-10-CM

## 2025-02-05 RX ORDER — OLMESARTAN MEDOXOMIL 40 MG/1
40 TABLET ORAL DAILY
Qty: 90 TABLET | Refills: 0 | Status: SHIPPED | OUTPATIENT
Start: 2025-02-05

## 2025-02-05 RX ORDER — POTASSIUM CHLORIDE 1500 MG/1
20 TABLET, EXTENDED RELEASE ORAL DAILY
Qty: 90 TABLET | Refills: 0 | Status: SHIPPED | OUTPATIENT
Start: 2025-02-05

## 2025-02-05 NOTE — TELEPHONE ENCOUNTER
Refill Decision Note   Latia Posada  is requesting a refill authorization.  Brief Assessment and Rationale for Refill:  Approve     Medication Therapy Plan:         Comments:     Note composed:1:48 PM 02/05/2025

## 2025-02-05 NOTE — TELEPHONE ENCOUNTER
No care due was identified.  [facility] Embedded Care Due Messages. Reference number: [id number].   2/05/2025 12:56:58 PM CST

## 2025-02-06 RX ORDER — POTASSIUM CHLORIDE 1500 MG/1
20 TABLET, EXTENDED RELEASE ORAL
Qty: 90 TABLET | Refills: 0 | OUTPATIENT
Start: 2025-02-06

## 2025-02-06 NOTE — TELEPHONE ENCOUNTER
Refill Decision Note   Latia Posada  is requesting a refill authorization.  Brief Assessment and Rationale for Refill:  Quick Discontinue     Medication Therapy Plan: E-Prescribing Status: Receipt confirmed by pharmacy (2/5/2025  1:48 PM CST)      Comments:     Note composed:1:47 PM 02/06/2025

## 2025-02-06 NOTE — TELEPHONE ENCOUNTER
No care due was identified.  [facility] Embedded Care Due Messages. Reference number: [id number].   2/06/2025 3:20:22 AM CST

## 2025-02-16 DIAGNOSIS — I10 ESSENTIAL HYPERTENSION: ICD-10-CM

## 2025-02-16 NOTE — TELEPHONE ENCOUNTER
No care due was identified.  [facility] Embedded Care Due Messages. Reference number: [id number].   2/16/2025 5:26:03 AM CST

## 2025-02-17 RX ORDER — AMLODIPINE BESYLATE 10 MG/1
10 TABLET ORAL
Qty: 90 TABLET | Refills: 0 | Status: SHIPPED | OUTPATIENT
Start: 2025-02-17

## 2025-02-17 NOTE — TELEPHONE ENCOUNTER
Refill Routing Note   Medication(s) are not appropriate for processing by [facility] for the following reason(s):        Patient not seen by provider within 15 months    [facility] action(s):  Defer               Appointments  past 12m or future 3m with PCP    Date Provider   Last Visit   11/15/2023 Lizzy Joya MD   Next Visit   Visit date not found Lizzy Joya MD   ED visits in past 90 days: 0        Note composed:12:57 PM 02/17/2025

## 2025-03-10 DIAGNOSIS — I10 ESSENTIAL HYPERTENSION: ICD-10-CM

## 2025-03-11 RX ORDER — CHLORTHALIDONE 25 MG/1
25 TABLET ORAL DAILY
Qty: 90 TABLET | Refills: 0 | Status: SHIPPED | OUTPATIENT
Start: 2025-03-11

## 2025-03-11 NOTE — TELEPHONE ENCOUNTER
No care due was identified.  [facility] Embedded Care Due Messages. Reference number: [id number].   3/10/2025 8:45:49 PM CDT

## 2025-03-11 NOTE — TELEPHONE ENCOUNTER
Refill Routing Note   Medication(s) are not appropriate for processing by [facility] for the following reason(s):        Patient not seen by provider within 15 months  Required labs outdated    [facility] action(s):  Defer             Appointments  past 12m or future 3m with PCP    Date Provider   Last Visit   11/15/2023 Lizzy Joya MD   Next Visit   Visit date not found Lizzy Joya MD   ED visits in past 90 days: 0        Note composed:1:37 PM 03/11/2025

## 2025-03-19 RX ORDER — ATORVASTATIN CALCIUM 40 MG/1
40 TABLET, FILM COATED ORAL
Qty: 90 TABLET | Refills: 0 | Status: SHIPPED | OUTPATIENT
Start: 2025-03-19

## 2025-03-19 RX ORDER — ATORVASTATIN CALCIUM 40 MG/1
40 TABLET, FILM COATED ORAL
Qty: 90 TABLET | Refills: 0 | Status: SHIPPED | OUTPATIENT
Start: 2025-03-19 | End: 2025-03-19

## 2025-03-19 NOTE — TELEPHONE ENCOUNTER
No care due was identified.  [facility] Embedded Care Due Messages. Reference number: [id number].   3/19/2025 1:44:05 PM CDT

## 2025-03-19 NOTE — TELEPHONE ENCOUNTER
No care due was identified.  [facility] Embedded Care Due Messages. Reference number: [id number].   3/19/2025 12:47:39 PM CDT

## 2025-04-02 ENCOUNTER — TELEPHONE (OUTPATIENT)
Dept: PHARMACY | Facility: CLINIC | Age: 65
End: 2025-04-02
Payer: COMMERCIAL

## 2025-04-02 NOTE — TELEPHONE ENCOUNTER
[facility]/Population Health Chart Review & Patient Outreach Details For Medication Adherence Project    Reason for Outreach Encounter: 3rd Party payor non-compliance report ([employer], [employer], C, etc)  2.  Patient Outreach Method: Reviewed Patient Chart  3.   Medication in question: atorvastatin   LAST FILLED: 3/19/25 for 90 day supply  Hyperlipidemia Medications              atorvastatin (LIPITOR) 40 MG tablet TAKE 1 TABLET(40 MG) BY MOUTH EVERY DAY               4.  Reviewed and or Updates Made To: Patient Chart  5. Outreach Outcomes and/or actions taken: Patient filled medication and is on track to be adherent

## 2025-05-03 DIAGNOSIS — I10 ESSENTIAL HYPERTENSION: ICD-10-CM

## 2025-05-03 NOTE — TELEPHONE ENCOUNTER
Care Due:                  Date            Visit Type   Department     Provider  --------------------------------------------------------------------------------                                EP -                              PRIMARY      [facility]  Last Visit: 11-      CARE ([facility])   MEDICINE       Lizzy Joya  Next Visit: None Scheduled  None         None Found                                                            Last  Test          Frequency    Reason                     Performed    Due Date  --------------------------------------------------------------------------------    Office Visit  15 months..  amLODIPine, atorvastatin,   11-   02-                             chlorthalidone,                             olmesartan, potassium....    [employer] Embedded Care Due Messages. Reference number: [id number].   5/03/2025 4:00:28 PM CDT

## 2025-05-05 ENCOUNTER — TELEPHONE (OUTPATIENT)
Dept: FAMILY MEDICINE | Facility: CLINIC | Age: 65
End: 2025-05-05
Payer: COMMERCIAL

## 2025-05-05 RX ORDER — CARVEDILOL 6.25 MG/1
6.25 TABLET ORAL 2 TIMES DAILY
Qty: 180 TABLET | Refills: 0 | Status: SHIPPED | OUTPATIENT
Start: 2025-05-05

## 2025-05-05 RX ORDER — POTASSIUM CHLORIDE 1500 MG/1
20 TABLET, EXTENDED RELEASE ORAL DAILY
Qty: 90 TABLET | Refills: 0 | Status: SHIPPED | OUTPATIENT
Start: 2025-05-05

## 2025-05-05 NOTE — TELEPHONE ENCOUNTER
Refill Routing Note   Medication(s) are not appropriate for processing by [facility] for the following reason(s):        Patient not seen by provider within 15 months    [facility] action(s):  Defer     Requires appointment : Yes             Appointments  past 12m or future 3m with PCP    Date Provider   Last Visit   11/15/2023 Lizzy Joya MD   Next Visit   Visit date not found Lizzy Joya MD   ED visits in past 90 days: 0        Note composed:12:47 PM 05/05/2025

## 2025-05-05 NOTE — TELEPHONE ENCOUNTER
Please contact her to schedule her annual exam with me in the fall.  I think she saw Nicol last year.  I would like to see her since I am refilling her meds, it could be October November.  Usual labs and bone density prior, thank you

## 2025-05-12 DIAGNOSIS — I10 ESSENTIAL HYPERTENSION: ICD-10-CM

## 2025-05-13 RX ORDER — OLMESARTAN MEDOXOMIL 40 MG/1
40 TABLET ORAL DAILY
Qty: 90 TABLET | Refills: 0 | Status: SHIPPED | OUTPATIENT
Start: 2025-05-13

## 2025-05-13 NOTE — TELEPHONE ENCOUNTER
Refill Routing Note   Medication(s) are not appropriate for processing by [facility] for the following reason(s):        Patient not seen by provider within 15 months    [facility] action(s):  Defer             Appointments  past 12m or future 3m with PCP    Date Provider   Last Visit   11/15/2023 Lizzy Joya MD   Next Visit   10/30/2025 Lizzy Joya MD   ED visits in past 90 days: 0        Note composed:1:56 PM 05/13/2025

## 2025-05-13 NOTE — TELEPHONE ENCOUNTER
No care due was identified.  [facility] Embedded Care Due Messages. Reference number: [id number].   5/12/2025 7:29:32 PM CDT

## 2025-05-17 DIAGNOSIS — I10 ESSENTIAL HYPERTENSION: ICD-10-CM

## 2025-05-17 NOTE — TELEPHONE ENCOUNTER
No care due was identified.  [facility] Embedded Care Due Messages. Reference number: [id number].   5/17/2025 5:27:49 AM CDT

## 2025-05-19 RX ORDER — AMLODIPINE BESYLATE 10 MG/1
10 TABLET ORAL
Qty: 90 TABLET | Refills: 0 | Status: SHIPPED | OUTPATIENT
Start: 2025-05-19

## 2025-05-22 ENCOUNTER — TELEPHONE (OUTPATIENT)
Dept: PHARMACY | Facility: CLINIC | Age: 65
End: 2025-05-22
Payer: COMMERCIAL

## 2025-05-22 NOTE — TELEPHONE ENCOUNTER
[facility] Chart Review & Patient Outreach Details For Medication Adherence Project    Reason for Outreach Encounter: 3rd Party payor non-compliance report ([employer], [employer], [employer], etc)  2.  Patient Outreach Method: Reviewed patient chart   3.   Medication in question:    Hyperlipidemia Medications              atorvastatin (LIPITOR) 40 MG tablet TAKE 1 TABLET(40 MG) BY MOUTH EVERY DAY                  atorvastatin  last filled  3/19 for 90 day supply      4.  Reviewed and or Updates Made To: Patient Chart  5. Outreach Outcomes and/or actions taken: Patient filled medication and is on track to be adherent  Additional Notes:

## 2025-06-02 ENCOUNTER — TELEPHONE (OUTPATIENT)
Dept: INTERNAL MEDICINE | Facility: CLINIC | Age: 65
End: 2025-06-02
Payer: COMMERCIAL

## 2025-06-05 ENCOUNTER — LAB VISIT (OUTPATIENT)
Dept: LAB | Facility: OTHER | Age: 65
End: 2025-06-05
Payer: COMMERCIAL

## 2025-06-05 ENCOUNTER — OFFICE VISIT (OUTPATIENT)
Dept: INTERNAL MEDICINE | Facility: CLINIC | Age: 65
End: 2025-06-05
Payer: COMMERCIAL

## 2025-06-05 VITALS
SYSTOLIC BLOOD PRESSURE: 110 MMHG | OXYGEN SATURATION: 95 % | DIASTOLIC BLOOD PRESSURE: 80 MMHG | HEART RATE: 68 BPM | WEIGHT: 143.06 LBS | BODY MASS INDEX: 23.09 KG/M2

## 2025-06-05 DIAGNOSIS — Z00.00 ANNUAL PHYSICAL EXAM: ICD-10-CM

## 2025-06-05 DIAGNOSIS — I10 ESSENTIAL HYPERTENSION: ICD-10-CM

## 2025-06-05 DIAGNOSIS — Z00.00 ANNUAL PHYSICAL EXAM: Primary | ICD-10-CM

## 2025-06-05 LAB
ABSOLUTE EOSINOPHIL (OHS): 0.13 K/UL
ABSOLUTE MONOCYTE (OHS): 0.5 K/UL (ref 0.3–1)
ABSOLUTE NEUTROPHIL COUNT (OHS): 2.34 K/UL (ref 1.8–7.7)
ALBUMIN SERPL BCP-MCNC: 4.3 G/DL (ref 3.5–5.2)
ALP SERPL-CCNC: 63 UNIT/L (ref 40–150)
ALT SERPL W/O P-5'-P-CCNC: 25 UNIT/L (ref 10–44)
ANION GAP (OHS): 10 MMOL/L (ref 8–16)
AST SERPL-CCNC: 22 UNIT/L (ref 11–45)
BASOPHILS # BLD AUTO: 0.03 K/UL
BASOPHILS NFR BLD AUTO: 0.7 %
BILIRUB SERPL-MCNC: 0.5 MG/DL (ref 0.1–1)
BUN SERPL-MCNC: 14 MG/DL (ref 8–23)
CALCIUM SERPL-MCNC: 11 MG/DL (ref 8.7–10.5)
CHLORIDE SERPL-SCNC: 107 MMOL/L (ref 95–110)
CHOLEST SERPL-MCNC: 122 MG/DL (ref 120–199)
CHOLEST/HDLC SERPL: 2.4 {RATIO} (ref 2–5)
CO2 SERPL-SCNC: 26 MMOL/L (ref 23–29)
CREAT SERPL-MCNC: 0.9 MG/DL (ref 0.5–1.4)
EAG (OHS): 126 MG/DL (ref 68–131)
ERYTHROCYTE [DISTWIDTH] IN BLOOD BY AUTOMATED COUNT: 15.1 % (ref 11.5–14.5)
GFR SERPLBLD CREATININE-BSD FMLA CKD-EPI: >60 ML/MIN/1.73/M2
GLUCOSE SERPL-MCNC: 91 MG/DL (ref 70–110)
HBA1C MFR BLD: 6 % (ref 4–5.6)
HCT VFR BLD AUTO: 47.6 % (ref 37–48.5)
HDLC SERPL-MCNC: 50 MG/DL (ref 40–75)
HDLC SERPL: 41 % (ref 20–50)
HGB BLD-MCNC: 15.6 GM/DL (ref 12–16)
IMM GRANULOCYTES # BLD AUTO: 0.01 K/UL (ref 0–0.04)
IMM GRANULOCYTES NFR BLD AUTO: 0.2 % (ref 0–0.5)
LDLC SERPL CALC-MCNC: 50.8 MG/DL (ref 63–159)
LYMPHOCYTES # BLD AUTO: 1.24 K/UL (ref 1–4.8)
MCH RBC QN AUTO: 29.5 PG (ref 27–31)
MCHC RBC AUTO-ENTMCNC: 32.8 G/DL (ref 32–36)
MCV RBC AUTO: 90 FL (ref 82–98)
NONHDLC SERPL-MCNC: 72 MG/DL
NUCLEATED RBC (/100WBC) (OHS): 0 /100 WBC
PLATELET # BLD AUTO: 214 K/UL (ref 150–450)
PMV BLD AUTO: 9.6 FL (ref 9.2–12.9)
POTASSIUM SERPL-SCNC: 4.1 MMOL/L (ref 3.5–5.1)
PROT SERPL-MCNC: 7.7 GM/DL (ref 6–8.4)
RBC # BLD AUTO: 5.29 M/UL (ref 4–5.4)
RELATIVE EOSINOPHIL (OHS): 3.1 %
RELATIVE LYMPHOCYTE (OHS): 29.2 % (ref 18–48)
RELATIVE MONOCYTE (OHS): 11.8 % (ref 4–15)
RELATIVE NEUTROPHIL (OHS): 55 % (ref 38–73)
SODIUM SERPL-SCNC: 143 MMOL/L (ref 136–145)
TRIGL SERPL-MCNC: 106 MG/DL (ref 30–150)
TSH SERPL-ACNC: 1.43 UIU/ML (ref 0.4–4)
WBC # BLD AUTO: 4.25 K/UL (ref 3.9–12.7)

## 2025-06-05 PROCEDURE — 99214 OFFICE O/P EST MOD 30 MIN: CPT | Mod: S$GLB,,,

## 2025-06-05 PROCEDURE — 1160F RVW MEDS BY RX/DR IN RCRD: CPT | Mod: CPTII,S$GLB,,

## 2025-06-05 PROCEDURE — 3008F BODY MASS INDEX DOCD: CPT | Mod: CPTII,S$GLB,,

## 2025-06-05 PROCEDURE — 3074F SYST BP LT 130 MM HG: CPT | Mod: CPTII,S$GLB,,

## 2025-06-05 PROCEDURE — 82465 ASSAY BLD/SERUM CHOLESTEROL: CPT

## 2025-06-05 PROCEDURE — 85025 COMPLETE CBC W/AUTO DIFF WBC: CPT

## 2025-06-05 PROCEDURE — 83036 HEMOGLOBIN GLYCOSYLATED A1C: CPT

## 2025-06-05 PROCEDURE — 4010F ACE/ARB THERAPY RXD/TAKEN: CPT | Mod: CPTII,S$GLB,,

## 2025-06-05 PROCEDURE — 84075 ASSAY ALKALINE PHOSPHATASE: CPT

## 2025-06-05 PROCEDURE — 36415 COLL VENOUS BLD VENIPUNCTURE: CPT

## 2025-06-05 PROCEDURE — 3044F HG A1C LEVEL LT 7.0%: CPT | Mod: CPTII,S$GLB,,

## 2025-06-05 PROCEDURE — 3079F DIAST BP 80-89 MM HG: CPT | Mod: CPTII,S$GLB,,

## 2025-06-05 PROCEDURE — 84443 ASSAY THYROID STIM HORMONE: CPT

## 2025-06-05 PROCEDURE — 1159F MED LIST DOCD IN RCRD: CPT | Mod: CPTII,S$GLB,,

## 2025-06-05 PROCEDURE — 99999 PR PBB SHADOW E&M-EST. PATIENT-LVL III: CPT | Mod: PBBFAC,,,

## 2025-06-05 RX ORDER — CHLORTHALIDONE 25 MG/1
25 TABLET ORAL DAILY
Qty: 90 TABLET | Refills: 3 | Status: SHIPPED | OUTPATIENT
Start: 2025-06-05

## 2025-06-05 NOTE — PROGRESS NOTES
Annual Wellness Visit  [facility]      SUBJECTIVE:     History of Present Illness:  Patient is a 65 y.o. female with current diagnoses of hypertension, hyperlipidemia, osteopenia, thyroid nodules, kidney stones, and GERD presents to clinic for an annual wellness visit.     #LPR  She has a history of hypertension, hyperlipidemia, thyroid nodules, and GERD. She was previously diagnosed with laryngopharyngeal reflux by ENT. She reports discomfort in neck and throat area and is uncertain whether symptoms are related to acid reflux or thyroid nodules. She is not currently taking any antacid medications.    #HTN  She takes amlodipine, Coreg, chlorthalidone, and olmesartan for blood pressure management with a potassium supplement due to chlorthalidone use. She takes Flomax as needed when experiencing symptoms suggestive of impending kidney stone passage.    Last pap smear- s/p hysterectomy  Last mammogram- due sept 2025  Immunizations UTD  Last colonoscopy- due in 2032  Last HgbA1C- due  Last lipid panel- due      Review of patient's allergies indicates:   Allergen Reactions    Lactose      Cramping, diarrhea       Past Medical History:   Diagnosis Date    Cataract     Chronic kidney disease     Family history of colon cancer 12/4/2012    General anesthetics causing adverse effect in therapeutic use     Hypercalcemia 8/7/2018    Hyperlipidemia     Hypertension     Kidney stones 12/4/2012    Menopause syndrome 12/4/2012    Thyroid nodule 12/4/2012    Vitamin D deficiency disease 2/3/2014     Past Surgical History:   Procedure Laterality Date    BREAST CYST EXCISION Right     CHOLECYSTECTOMY      COLONOSCOPY N/A 4/1/2022    Procedure: COLONOSCOPY;  Surgeon: Karson Goodman MD;  Location: [address];  Service: Endoscopy;  Laterality: N/A;  fully vaccinated-inst portal-tb    DENTAL SURGERY      DILATION AND CURETTAGE OF UTERUS      HYSTERECTOMY  age 42    RONALD (fibroids, AUB), RSO  (cyst) ME    PARATHYROIDECTOMY N/A 10/18/2019    Procedure: PARATHYROIDECTOMY;  Surgeon: Myesha Chaney MD;  Location: [facility];  Service: General;  Laterality: N/A;     Family History   Problem Relation Name Age of Onset    Heart disease Mother 50s 45        smoker    Hypertension Mother 50s     Heart disease Father      Kidney disease Father      Diabetes Father      Colon cancer Father      Cancer Father          COLON    Esophageal cancer Father      Hypertension Sister      Heart disease Maternal Grandfather      Breast cancer Paternal Grandmother      Cancer Paternal Grandmother          BREAST    Ovarian cancer Neg Hx       Social History[1]       OBJECTIVE:     Vital Signs (Most Recent)  Vitals:    06/05/25 0957   BP: 110/80   BP Location: Right arm   Patient Position: Sitting   Pulse: 68   SpO2: 95%   Weight: 64.9 kg (143 lb 1.3 oz)       Physical Exam:  Gen: Well nourished and developed, NAD, appears stated age  HEENT: normocephalic, atraumatic, EOMI, external ear normal, TMs visualized bilaterally not impacted by cerumen, no nasal septal deviation, oropharynx mucosa pink and moist without tonsillar exudates  Neck: trachea midline, no LAD, no thyromegaly  CV: RRR, S1 and S2 present, no murmurs, no LE edema, radial and dorsalis pedis pulses equal and present bilaterally  Resp: breathing comfortably on room air, CTAB, no wheezes or crackles  Abd: Soft, non-distended, non-tender  Skin: No rashes or abnormal skin lesions, no apparent jaundice or bruising  Neuro: A&Ox4, sensation intact throughout, spontaneous movements, gait smooth zulma wnl, UE and LE strength 5/5 bilaterally  MSK: Full ROM of all extremities, no tenderness to palpation of shoulders and knees, no joint edema  Psych: Logical thought process, answers questions appropriately      ASSESSMENT/PLAN:   65 y.o.female presents to clinic for annual wellness visit.     Reviewed history of HTN, high cholesterol, thyroid nodules, and acid  reflux.  Assessed thyroid nodules from October imaging, noting they were benign and did not meet criteria for follow-up.  Considered laryngopharyngeal reflux as potential cause of neck discomfort, based on previous ENT assessment.  Evaluated current antihypertensive regimen, noting good BP control.  Assessed need for continued potassium supplementation due to chlorthalidone use.    Order diabetes screening, metabolite panel, thyroid function tests, CBC, CMP, and lipid panel as part of annual labs   Continue tamsulosin (Flomax) as needed for kidney stones   Maintain current antihypertensive regimen: amlodipine, Coreg, chlorthalidone, and olmesartan   Continue potassium supplementation   Initiate 2-week trial of OTC Prilosec for possible acid reflux   Implement reflux diet changes as recommended by ENT    Contact office if symptoms improve to consider consistent regimen     Latia was seen today for establish care.    Diagnoses and all orders for this visit:    Annual physical exam  -     CBC Auto Differential; Future  -     Comprehensive Metabolic Panel; Future  -     TSH; Future  -     Lipid Panel; Future  -     Hemoglobin A1C; Future    Essential hypertension  -     chlorthalidone (HYGROTEN) 25 MG Tab; Take 1 tablet (25 mg total) by mouth once daily.        Follow-up: 1 year for annual or prn    I spent a total of 30 minutes on the day of the visit.This includes face to face time and non-face to face time preparing to see the patient (eg, review of tests), obtaining and/or reviewing separately obtained history, documenting clinical information in the electronic or other health record, independently interpreting results and communicating results to the patient/family/caregiver, or care coordinator.    This note was generated with the assistance of ambient listening technology. Verbal consent was obtained by the patient and accompanying visitor(s) for the recording of patient appointment to facilitate this note. I  attest to having reviewed and edited the generated note for accuracy, though some syntax or spelling errors may persist. Please contact the author of this note for any clarification.      Matthew Varela MD  [facility]             [1]   Social History  Tobacco Use    Smoking status: Never    Smokeless tobacco: Never   Substance Use Topics    Alcohol use: Yes     Comment: rarely    Drug use: No

## 2025-06-12 ENCOUNTER — RESULTS FOLLOW-UP (OUTPATIENT)
Dept: INTERNAL MEDICINE | Facility: CLINIC | Age: 65
End: 2025-06-12

## 2025-06-18 ENCOUNTER — TELEPHONE (OUTPATIENT)
Dept: OPTOMETRY | Facility: CLINIC | Age: 65
End: 2025-06-18
Payer: COMMERCIAL

## 2025-06-18 NOTE — TELEPHONE ENCOUNTER
----- Message from Marbella Ramirez OD sent at 6/18/2025 10:10 AM CDT -----    ----- Message -----  From: Marbella Ramirez OD  Sent: 6/18/2025  10:09 AM CDT  To: Marbella Ramirez OD    Call to schedule HVF/OCT before appt

## 2025-06-23 RX ORDER — ATORVASTATIN CALCIUM 40 MG/1
40 TABLET, FILM COATED ORAL DAILY
Qty: 90 TABLET | Refills: 0 | Status: SHIPPED | OUTPATIENT
Start: 2025-06-23

## 2025-06-23 NOTE — TELEPHONE ENCOUNTER
Refill Routing Note   Medication(s) are not appropriate for processing by [facility] for the following reason(s):        Non-participating provider    [facility] action(s):  Route      Medication Therapy Plan: Last ordered: 3/19/25 by TREV PADILLA . Recent OV but no current order by PCP      Appointments  past 12m or future 3m with PCP    Date Provider   Last Visit   6/5/2025 Matthew Varela MD   Next Visit   Visit date not found Matthew Varela MD   ED visits in past 90 days: 0        Note composed:12:17 PM 06/23/2025

## 2025-07-01 ENCOUNTER — PATIENT MESSAGE (OUTPATIENT)
Dept: SLEEP MEDICINE | Facility: CLINIC | Age: 65
End: 2025-07-01

## 2025-07-01 ENCOUNTER — OFFICE VISIT (OUTPATIENT)
Dept: SLEEP MEDICINE | Facility: CLINIC | Age: 65
End: 2025-07-01
Payer: COMMERCIAL

## 2025-07-01 VITALS — WEIGHT: 143.31 LBS | BODY MASS INDEX: 23.03 KG/M2 | HEIGHT: 66 IN

## 2025-07-01 DIAGNOSIS — G47.33 OSA (OBSTRUCTIVE SLEEP APNEA): Primary | ICD-10-CM

## 2025-07-01 DIAGNOSIS — G47.30 HYPERSOMNIA WITH SLEEP APNEA: ICD-10-CM

## 2025-07-01 DIAGNOSIS — G47.10 HYPERSOMNIA WITH SLEEP APNEA: ICD-10-CM

## 2025-07-01 PROCEDURE — 1159F MED LIST DOCD IN RCRD: CPT | Mod: CPTII,S$GLB,, | Performed by: NURSE PRACTITIONER

## 2025-07-01 PROCEDURE — 99214 OFFICE O/P EST MOD 30 MIN: CPT | Mod: S$GLB,,, | Performed by: NURSE PRACTITIONER

## 2025-07-01 PROCEDURE — 99999 PR PBB SHADOW E&M-EST. PATIENT-LVL III: CPT | Mod: PBBFAC,,, | Performed by: NURSE PRACTITIONER

## 2025-07-01 PROCEDURE — 3044F HG A1C LEVEL LT 7.0%: CPT | Mod: CPTII,S$GLB,, | Performed by: NURSE PRACTITIONER

## 2025-07-01 PROCEDURE — 4010F ACE/ARB THERAPY RXD/TAKEN: CPT | Mod: CPTII,S$GLB,, | Performed by: NURSE PRACTITIONER

## 2025-07-01 PROCEDURE — 3008F BODY MASS INDEX DOCD: CPT | Mod: CPTII,S$GLB,, | Performed by: NURSE PRACTITIONER

## 2025-07-01 RX ORDER — MODAFINIL 200 MG/1
200 TABLET ORAL DAILY PRN
Qty: 30 TABLET | Refills: 1 | Status: SHIPPED | OUTPATIENT
Start: 2025-07-01 | End: 2025-12-28

## 2025-07-01 NOTE — PROGRESS NOTES
"Cc: ALLYN    She continues to sleep regularly with apap 10-14cm qhs. Most often awakening from sleep and at times unable to return to sleep for instance if up 4a and other times can take 30+min to return to sleep. Dreamwear mask. Ongoing low level fatigue. ESS=4  Bp stable (4 meds)  Denies sleep paralysis, vivid dreams with sleep onset or H/H hallucinations.     Remote 60d avg 5:09h/n AHI 2.9, 90 %tile 11cm    Ht 5' 6" (1.676 m)   Wt 65 kg (143 lb 4.8 oz)   BMI 23.13 kg/m²     2021 AHI 4(RDI 15) HST    Assessment:  ALLYN, mild based on RDI criteria. AHI<5. Adherent and benefits from therapy but residual low energy/fatigue  HTN    PLAN:  Continue  auto 10-14 . DME [facility] supplies  Discussed control of ALLYN and criteria for wake promoting med Provigil 200mg 1/2-1 tab qd prn (if begins med she will notify me status/dose effective)  See pcp re HTN mgt/continue meds and discuss with PCP about potential reduction/she's in digital monitoring program)  Rtc 1 yrs, sooner if needed      "

## 2025-07-03 ENCOUNTER — PATIENT MESSAGE (OUTPATIENT)
Dept: SLEEP MEDICINE | Facility: CLINIC | Age: 65
End: 2025-07-03
Payer: COMMERCIAL

## 2025-07-10 ENCOUNTER — TELEPHONE (OUTPATIENT)
Dept: PHARMACY | Facility: CLINIC | Age: 65
End: 2025-07-10
Payer: COMMERCIAL

## 2025-07-10 NOTE — TELEPHONE ENCOUNTER
[facility] Chart Review & Patient Outreach Details For Medication Adherence Project    Reason for Outreach Encounter: 3rd Party payor non-compliance report ([employer], BC[employer], C, etc)  2.  Patient Outreach Method: Reviewed Patient Chart  3.   Medication in question: atorvastatin   LAST FILLED: 6/24/25 for 90 day supply  Hyperlipidemia Medications              atorvastatin (LIPITOR) 40 MG tablet Take 1 tablet (40 mg total) by mouth once daily.               4.  Reviewed and or Updates Made To: Patient Chart  5. Outreach Outcomes and/or actions taken: Patient filled medication and is on track to be adherent

## 2025-07-24 DIAGNOSIS — I10 ESSENTIAL HYPERTENSION: ICD-10-CM

## 2025-07-24 RX ORDER — OLMESARTAN MEDOXOMIL 40 MG/1
40 TABLET ORAL
Qty: 90 TABLET | Refills: 0 | Status: SHIPPED | OUTPATIENT
Start: 2025-07-24

## 2025-07-24 NOTE — TELEPHONE ENCOUNTER
Refill Routing Note   Medication(s) are not appropriate for processing by [facility] for the following reason(s):        Non-participating provider    [facility] action(s):  Route      Medication Therapy Plan: Last ordered:5/13/2025  by Lizzy Joya MD. Recent OV but no current order by PCP      Appointments  past 12m or future 3m with PCP    Date Provider   Last Visit   6/5/2025 Matthew Varela MD   Next Visit   Visit date not found Matthew Varela MD   ED visits in past 90 days: 0        Note composed:8:34 AM 07/24/2025

## 2025-07-30 DIAGNOSIS — I10 ESSENTIAL HYPERTENSION: ICD-10-CM

## 2025-07-30 RX ORDER — CARVEDILOL 6.25 MG/1
6.25 TABLET ORAL
Qty: 180 TABLET | Refills: 0 | Status: SHIPPED | OUTPATIENT
Start: 2025-07-30

## 2025-07-30 NOTE — TELEPHONE ENCOUNTER
Refill Routing Note   Medication(s) are not appropriate for processing by [facility] for the following reason(s):        Non-participating provider    [facility] action(s):  Route             Appointments  past 12m or future 3m with PCP    Date Provider   Last Visit   6/5/2025 Matthew Varela MD   Next Visit   Visit date not found Matthew Varela MD   ED visits in past 90 days: 0        Note composed:7:25 AM 07/30/2025

## 2025-07-31 ENCOUNTER — PATIENT MESSAGE (OUTPATIENT)
Dept: INTERNAL MEDICINE | Facility: CLINIC | Age: 65
End: 2025-07-31
Payer: COMMERCIAL

## 2025-07-31 DIAGNOSIS — E87.6 HYPOKALEMIA: ICD-10-CM

## 2025-07-31 DIAGNOSIS — E78.2 MIXED HYPERLIPIDEMIA: Primary | ICD-10-CM

## 2025-07-31 DIAGNOSIS — I10 ESSENTIAL HYPERTENSION: ICD-10-CM

## 2025-07-31 RX ORDER — ATORVASTATIN CALCIUM 40 MG/1
40 TABLET, FILM COATED ORAL DAILY
Qty: 90 TABLET | Refills: 3 | Status: SHIPPED | OUTPATIENT
Start: 2025-07-31

## 2025-07-31 RX ORDER — AMLODIPINE BESYLATE 10 MG/1
10 TABLET ORAL DAILY
Qty: 90 TABLET | Refills: 3 | Status: SHIPPED | OUTPATIENT
Start: 2025-07-31

## 2025-07-31 RX ORDER — POTASSIUM CHLORIDE 1500 MG/1
20 TABLET, EXTENDED RELEASE ORAL DAILY
Qty: 90 TABLET | Refills: 3 | Status: SHIPPED | OUTPATIENT
Start: 2025-07-31

## 2025-07-31 NOTE — TELEPHONE ENCOUNTER
Refill Encounter    PCP Visits: Recent Visits  Date Type Provider Dept   06/05/25 Office Visit Matthew Varela MD [facility] Internal Medicine   Showing recent visits within past 360 days and meeting all other requirements  Future Appointments  No visits were found meeting these conditions.  Showing future appointments within next 720 days and meeting all other requirements      Last 3 Blood Pressure:   BP Readings from Last 3 Encounters:   06/05/25 110/80   10/16/24 100/80   08/23/24 120/76     Preferred Pharmacy:   [id number] [address] & [facility]  [address] [facility] [address]  Phone: [phone] Fax: [phone]    Requested RX:  Requested Prescriptions     Pending Prescriptions Disp Refills    atorvastatin (LIPITOR) 40 MG tablet 90 tablet 3     Sig: Take 1 tablet (40 mg total) by mouth once daily.    potassium chloride (K-TAB) 20 mEq 90 tablet 3     Sig: Take 1 tablet (20 mEq total) by mouth once daily.    amLODIPine (NORVASC) 10 MG tablet 90 tablet 3     Sig: Take 1 tablet (10 mg total) by mouth once daily.      RX Route: Normal

## 2025-08-04 ENCOUNTER — CLINICAL SUPPORT (OUTPATIENT)
Dept: OPHTHALMOLOGY | Facility: CLINIC | Age: 65
End: 2025-08-04
Payer: COMMERCIAL

## 2025-08-04 ENCOUNTER — OFFICE VISIT (OUTPATIENT)
Dept: OPTOMETRY | Facility: CLINIC | Age: 65
End: 2025-08-04
Payer: COMMERCIAL

## 2025-08-04 DIAGNOSIS — H43.813 PVD (POSTERIOR VITREOUS DETACHMENT), BILATERAL: ICD-10-CM

## 2025-08-04 DIAGNOSIS — H26.9 CORTICAL CATARACT OF BOTH EYES: ICD-10-CM

## 2025-08-04 DIAGNOSIS — H25.13 NUCLEAR SCLEROSIS OF BOTH EYES: ICD-10-CM

## 2025-08-04 DIAGNOSIS — H52.203 MYOPIA WITH ASTIGMATISM AND PRESBYOPIA, BILATERAL: ICD-10-CM

## 2025-08-04 DIAGNOSIS — H52.13 MYOPIA WITH ASTIGMATISM AND PRESBYOPIA, BILATERAL: ICD-10-CM

## 2025-08-04 DIAGNOSIS — G47.33 OSA (OBSTRUCTIVE SLEEP APNEA): ICD-10-CM

## 2025-08-04 DIAGNOSIS — H47.393 OPTIC NERVE CUPPING OF BOTH EYES: Primary | ICD-10-CM

## 2025-08-04 DIAGNOSIS — H52.4 MYOPIA WITH ASTIGMATISM AND PRESBYOPIA, BILATERAL: ICD-10-CM

## 2025-08-04 PROCEDURE — 99214 OFFICE O/P EST MOD 30 MIN: CPT | Mod: S$GLB,,, | Performed by: OPTOMETRIST

## 2025-08-04 PROCEDURE — 92015 DETERMINE REFRACTIVE STATE: CPT | Mod: S$GLB,,, | Performed by: OPTOMETRIST

## 2025-08-04 PROCEDURE — 92133 CPTRZD OPH DX IMG PST SGM ON: CPT | Mod: S$GLB,,, | Performed by: OPTOMETRIST

## 2025-08-04 PROCEDURE — 1159F MED LIST DOCD IN RCRD: CPT | Mod: CPTII,S$GLB,, | Performed by: OPTOMETRIST

## 2025-08-04 PROCEDURE — 4010F ACE/ARB THERAPY RXD/TAKEN: CPT | Mod: CPTII,S$GLB,, | Performed by: OPTOMETRIST

## 2025-08-04 PROCEDURE — 92083 EXTENDED VISUAL FIELD XM: CPT | Mod: S$GLB,,, | Performed by: OPTOMETRIST

## 2025-08-04 PROCEDURE — 3044F HG A1C LEVEL LT 7.0%: CPT | Mod: CPTII,S$GLB,, | Performed by: OPTOMETRIST

## 2025-08-04 PROCEDURE — 99999 PR PBB SHADOW E&M-EST. PATIENT-LVL III: CPT | Mod: PBBFAC,,, | Performed by: OPTOMETRIST

## 2025-08-04 NOTE — PROGRESS NOTES
OCT/HVF done ou./ rel/fix/coop. Good ou./ chart checked for latex allergy./ -9.00 + 1.25 x 180/od -9.00 + 1.25 x 80/os-h

## 2025-08-18 ENCOUNTER — HOSPITAL ENCOUNTER (OUTPATIENT)
Dept: RADIOLOGY | Facility: OTHER | Age: 65
Discharge: HOME | End: 2025-08-18
Attending: NURSE PRACTITIONER
Payer: COMMERCIAL

## 2025-08-18 DIAGNOSIS — N20.0 NEPHROLITHIASIS: ICD-10-CM

## 2025-08-18 PROCEDURE — 74018 RADEX ABDOMEN 1 VIEW: CPT | Mod: TC

## 2025-08-18 PROCEDURE — 76770 US EXAM ABDO BACK WALL COMP: CPT | Mod: 26,,, | Performed by: RADIOLOGY

## 2025-08-18 PROCEDURE — 76770 US EXAM ABDO BACK WALL COMP: CPT | Mod: TC

## 2025-08-18 PROCEDURE — 74018 RADEX ABDOMEN 1 VIEW: CPT | Mod: 26,,, | Performed by: RADIOLOGY

## 2025-08-25 ENCOUNTER — OFFICE VISIT (OUTPATIENT)
Dept: UROLOGY | Facility: CLINIC | Age: 65
End: 2025-08-25
Payer: COMMERCIAL

## 2025-08-25 VITALS
WEIGHT: 141.75 LBS | HEART RATE: 65 BPM | SYSTOLIC BLOOD PRESSURE: 126 MMHG | HEIGHT: 66 IN | DIASTOLIC BLOOD PRESSURE: 83 MMHG | BODY MASS INDEX: 22.78 KG/M2 | OXYGEN SATURATION: 95 %

## 2025-08-25 DIAGNOSIS — N20.0 NEPHROLITHIASIS: Primary | ICD-10-CM

## 2025-08-25 DIAGNOSIS — N28.1 COMPLEX RENAL CYST: ICD-10-CM

## 2025-08-25 PROCEDURE — 1159F MED LIST DOCD IN RCRD: CPT | Mod: CPTII,S$GLB,, | Performed by: NURSE PRACTITIONER

## 2025-08-25 PROCEDURE — 3044F HG A1C LEVEL LT 7.0%: CPT | Mod: CPTII,S$GLB,, | Performed by: NURSE PRACTITIONER

## 2025-08-25 PROCEDURE — 1160F RVW MEDS BY RX/DR IN RCRD: CPT | Mod: CPTII,S$GLB,, | Performed by: NURSE PRACTITIONER

## 2025-08-25 PROCEDURE — 3074F SYST BP LT 130 MM HG: CPT | Mod: CPTII,S$GLB,, | Performed by: NURSE PRACTITIONER

## 2025-08-25 PROCEDURE — 4010F ACE/ARB THERAPY RXD/TAKEN: CPT | Mod: CPTII,S$GLB,, | Performed by: NURSE PRACTITIONER

## 2025-08-25 PROCEDURE — 99214 OFFICE O/P EST MOD 30 MIN: CPT | Mod: S$GLB,,, | Performed by: NURSE PRACTITIONER

## 2025-08-25 PROCEDURE — 3079F DIAST BP 80-89 MM HG: CPT | Mod: CPTII,S$GLB,, | Performed by: NURSE PRACTITIONER

## 2025-08-25 PROCEDURE — 3008F BODY MASS INDEX DOCD: CPT | Mod: CPTII,S$GLB,, | Performed by: NURSE PRACTITIONER

## 2025-08-30 ENCOUNTER — HOSPITAL ENCOUNTER (OUTPATIENT)
Dept: RADIOLOGY | Facility: OTHER | Age: 65
Discharge: HOME | End: 2025-08-30
Attending: NURSE PRACTITIONER
Payer: COMMERCIAL

## 2025-08-30 DIAGNOSIS — N20.0 NEPHROLITHIASIS: ICD-10-CM

## 2025-08-30 PROCEDURE — 74176 CT ABD & PELVIS W/O CONTRAST: CPT | Mod: TC

## 2025-08-30 PROCEDURE — 74176 CT ABD & PELVIS W/O CONTRAST: CPT | Mod: 26,,, | Performed by: RADIOLOGY

## (undated) DEVICE — SUT 5-0 MONO P-3 18IN

## (undated) DEVICE — GLOVE BIOGEL ECLIPSE SZ 7

## (undated) DEVICE — TRAY DRY SKIN SCRUB PREP

## (undated) DEVICE — Device

## (undated) DEVICE — CONTAINER SPECIMEN STRL 4OZ

## (undated) DEVICE — PACK UNIV PROCEDURE

## (undated) DEVICE — SEE MEDLINE ITEM 157194

## (undated) DEVICE — ELECTRODE BLADE INSULATED 1 IN

## (undated) DEVICE — SCRUB HIBICLENS 4% CHG 4OZ

## (undated) DEVICE — ADHESIVE DERMABOND ADVANCED

## (undated) DEVICE — SPONGE KITTNER 1/4X 5/8 L STRL

## (undated) DEVICE — SEE MEDLINE ITEM 152622

## (undated) DEVICE — HEMOSTAT SURGICEL FIBRLR 1X2IN

## (undated) DEVICE — BLADE SURG STAINLESS STEEL #15

## (undated) DEVICE — DRESSING TELFA N ADH 3X8

## (undated) DEVICE — SUT 4-0 12-18IN SILK BLACK

## (undated) DEVICE — PROBE PRASS SLIM

## (undated) DEVICE — SUT VICRYL PLUS 3-0 SH 18IN

## (undated) DEVICE — SEE MEDLINE ITEM 152532

## (undated) DEVICE — SYR 10CC LUER LOCK

## (undated) DEVICE — NDL HYPO REG 25G X 1 1/2

## (undated) DEVICE — WARMER DRAPE STERILE LF

## (undated) DEVICE — SEE MEDLINE ITEM 157166

## (undated) DEVICE — CORD BIPOLAR 12 FOOT

## (undated) DEVICE — SPONGE GAUZE 16PLY 4X4

## (undated) DEVICE — DRAPE STERI INSTRUMENT 1018

## (undated) DEVICE — CLIP LIGATING HEMOCLP SMALL

## (undated) DEVICE — ELECTRODE REM PLYHSV RETURN 9

## (undated) DEVICE — CLIP LIGATING MEDIUM

## (undated) DEVICE — MARKER SKIN STND TIP BLUE BARR

## (undated) DEVICE — SEE MEDLINE ITEM 157117